# Patient Record
Sex: MALE | Race: BLACK OR AFRICAN AMERICAN | Employment: UNEMPLOYED | URBAN - METROPOLITAN AREA
[De-identification: names, ages, dates, MRNs, and addresses within clinical notes are randomized per-mention and may not be internally consistent; named-entity substitution may affect disease eponyms.]

---

## 2017-03-08 DIAGNOSIS — I10 ESSENTIAL HYPERTENSION: ICD-10-CM

## 2017-03-08 DIAGNOSIS — E66.9 OBESITY (BMI 30-39.9): ICD-10-CM

## 2017-03-08 RX ORDER — AMLODIPINE BESYLATE 10 MG/1
TABLET ORAL
Qty: 30 TABLET | Refills: 0 | Status: SHIPPED | OUTPATIENT
Start: 2017-03-08 | End: 2017-07-10 | Stop reason: SDUPTHER

## 2017-07-10 DIAGNOSIS — E66.9 OBESITY (BMI 30-39.9): ICD-10-CM

## 2017-07-10 DIAGNOSIS — I10 ESSENTIAL HYPERTENSION: ICD-10-CM

## 2017-07-10 RX ORDER — LOSARTAN POTASSIUM AND HYDROCHLOROTHIAZIDE 25; 100 MG/1; MG/1
TABLET ORAL
Qty: 30 TABLET | Refills: 0 | Status: SHIPPED | OUTPATIENT
Start: 2017-07-10 | End: 2017-08-02 | Stop reason: SDUPTHER

## 2017-07-10 RX ORDER — LOSARTAN POTASSIUM AND HYDROCHLOROTHIAZIDE 25; 100 MG/1; MG/1
TABLET ORAL
Qty: 30 TABLET | Refills: 3 | Status: CANCELLED | OUTPATIENT
Start: 2017-07-10

## 2017-07-10 RX ORDER — AMLODIPINE BESYLATE 10 MG/1
TABLET ORAL
Qty: 30 TABLET | Refills: 0 | Status: SHIPPED | OUTPATIENT
Start: 2017-07-10 | End: 2017-08-02 | Stop reason: SDUPTHER

## 2017-07-10 RX ORDER — AMLODIPINE BESYLATE 10 MG/1
TABLET ORAL
Qty: 30 TABLET | Refills: 3 | Status: CANCELLED | OUTPATIENT
Start: 2017-07-10

## 2017-08-02 ENCOUNTER — INITIAL CONSULT (OUTPATIENT)
Dept: PEDIATRIC CARDIOLOGY | Age: 22
End: 2017-08-02
Payer: COMMERCIAL

## 2017-08-02 VITALS
HEART RATE: 96 BPM | DIASTOLIC BLOOD PRESSURE: 88 MMHG | HEIGHT: 65 IN | WEIGHT: 299.4 LBS | OXYGEN SATURATION: 99 % | RESPIRATION RATE: 24 BRPM | BODY MASS INDEX: 49.88 KG/M2 | SYSTOLIC BLOOD PRESSURE: 150 MMHG

## 2017-08-02 DIAGNOSIS — E66.9 OBESITY (BMI 30-39.9): Primary | ICD-10-CM

## 2017-08-02 DIAGNOSIS — I10 ESSENTIAL HYPERTENSION: ICD-10-CM

## 2017-08-02 PROCEDURE — 99214 OFFICE O/P EST MOD 30 MIN: CPT | Performed by: PEDIATRICS

## 2017-08-02 RX ORDER — AMLODIPINE BESYLATE 10 MG/1
10 TABLET ORAL DAILY
Qty: 30 TABLET | Refills: 5 | Status: SHIPPED | OUTPATIENT
Start: 2017-08-02 | End: 2018-03-26 | Stop reason: SDUPTHER

## 2017-08-02 RX ORDER — LOSARTAN POTASSIUM AND HYDROCHLOROTHIAZIDE 25; 100 MG/1; MG/1
1 TABLET ORAL DAILY
Qty: 30 TABLET | Refills: 0 | Status: SHIPPED | OUTPATIENT
Start: 2017-08-02 | End: 2018-03-26 | Stop reason: SDUPTHER

## 2017-08-29 ENCOUNTER — HOSPITAL ENCOUNTER (EMERGENCY)
Age: 22
Discharge: HOME | End: 2017-08-29
Attending: EMERGENCY MEDICINE
Payer: COMMERCIAL

## 2017-08-29 VITALS
OXYGEN SATURATION: 97 % | TEMPERATURE: 98.6 F | SYSTOLIC BLOOD PRESSURE: 159 MMHG | HEART RATE: 101 BPM | DIASTOLIC BLOOD PRESSURE: 86 MMHG | RESPIRATION RATE: 18 BRPM

## 2017-08-29 DIAGNOSIS — F69 BEHAVIOR CONCERN IN ADULT: Primary | ICD-10-CM

## 2017-08-29 PROCEDURE — 99284 EMERGENCY DEPT VISIT MOD MDM: CPT

## 2017-08-29 ASSESSMENT — ENCOUNTER SYMPTOMS
ABDOMINAL PAIN: 0
SORE THROAT: 0
VOMITING: 0
CHEST TIGHTNESS: 0
SHORTNESS OF BREATH: 0
EYE PAIN: 0
NAUSEA: 0

## 2018-03-07 ENCOUNTER — HOSPITAL ENCOUNTER (INPATIENT)
Age: 23
LOS: 4 days | Discharge: HOME | DRG: 753 | End: 2018-03-11
Attending: PSYCHIATRY & NEUROLOGY | Admitting: PSYCHIATRY & NEUROLOGY
Payer: COMMERCIAL

## 2018-03-07 DIAGNOSIS — F31.9 BIPOLAR 1 DISORDER (HCC): Primary | ICD-10-CM

## 2018-03-07 DIAGNOSIS — E66.9 OBESITY (BMI 30-39.9): ICD-10-CM

## 2018-03-07 DIAGNOSIS — F79 MENTAL RETARDATION: ICD-10-CM

## 2018-03-07 LAB
ACETAMINOPHEN LEVEL: <15 UG/ML (ref 10–30)
ALBUMIN SERPL-MCNC: 5.2 G/DL (ref 3.9–4.9)
ALP BLD-CCNC: 81 U/L (ref 35–104)
ALT SERPL-CCNC: 54 U/L (ref 0–41)
AMPHETAMINE SCREEN, URINE: NORMAL
ANION GAP SERPL CALCULATED.3IONS-SCNC: 15 MEQ/L (ref 7–13)
AST SERPL-CCNC: 37 U/L (ref 0–40)
BACTERIA: NORMAL /HPF
BARBITURATE SCREEN URINE: NORMAL
BASOPHILS ABSOLUTE: 0 K/UL (ref 0–0.2)
BASOPHILS RELATIVE PERCENT: 0.6 %
BENZODIAZEPINE SCREEN, URINE: NORMAL
BILIRUB SERPL-MCNC: 0.8 MG/DL (ref 0–1.2)
BILIRUBIN URINE: NEGATIVE
BLOOD, URINE: NEGATIVE
BUN BLDV-MCNC: 10 MG/DL (ref 6–20)
CALCIUM SERPL-MCNC: 10 MG/DL (ref 8.6–10.2)
CANNABINOID SCREEN URINE: NORMAL
CHLORIDE BLD-SCNC: 95 MEQ/L (ref 98–107)
CK MB: 7.4 NG/ML (ref 0–6.7)
CLARITY: CLEAR
CO2: 28 MEQ/L (ref 22–29)
COCAINE METABOLITE SCREEN URINE: NORMAL
COLOR: YELLOW
CREAT SERPL-MCNC: 0.8 MG/DL (ref 0.7–1.2)
CREATINE KINASE-MB INDEX: 1.1 % (ref 0–3.5)
EKG ATRIAL RATE: 82 BPM
EKG P AXIS: 61 DEGREES
EKG P-R INTERVAL: 154 MS
EKG Q-T INTERVAL: 362 MS
EKG QRS DURATION: 86 MS
EKG QTC CALCULATION (BAZETT): 422 MS
EKG R AXIS: 77 DEGREES
EKG T AXIS: 30 DEGREES
EKG VENTRICULAR RATE: 82 BPM
EOSINOPHILS ABSOLUTE: 0.2 K/UL (ref 0–0.7)
EOSINOPHILS RELATIVE PERCENT: 2.9 %
EPITHELIAL CELLS, UA: NORMAL /HPF
ETHANOL PERCENT: NORMAL G/DL
ETHANOL: <10 MG/DL (ref 0–0.08)
GFR AFRICAN AMERICAN: >60
GFR NON-AFRICAN AMERICAN: >60
GLOBULIN: 3.1 G/DL (ref 2.3–3.5)
GLUCOSE BLD-MCNC: 96 MG/DL (ref 74–109)
GLUCOSE URINE: NEGATIVE MG/DL
HCT VFR BLD CALC: 50.4 % (ref 42–52)
HEMOGLOBIN: 16.8 G/DL (ref 14–18)
KETONES, URINE: NEGATIVE MG/DL
LEUKOCYTE ESTERASE, URINE: NEGATIVE
LYMPHOCYTES ABSOLUTE: 2.1 K/UL (ref 1–4.8)
LYMPHOCYTES RELATIVE PERCENT: 31.4 %
Lab: NORMAL
MCH RBC QN AUTO: 29 PG (ref 27–31.3)
MCHC RBC AUTO-ENTMCNC: 33.4 % (ref 33–37)
MCV RBC AUTO: 86.9 FL (ref 80–100)
MONOCYTES ABSOLUTE: 0.8 K/UL (ref 0.2–0.8)
MONOCYTES RELATIVE PERCENT: 11.7 %
NEUTROPHILS ABSOLUTE: 3.6 K/UL (ref 1.4–6.5)
NEUTROPHILS RELATIVE PERCENT: 53.4 %
NITRITE, URINE: NEGATIVE
OPIATE SCREEN URINE: NORMAL
PDW BLD-RTO: 13.5 % (ref 11.5–14.5)
PH UA: 6 (ref 5–9)
PHENCYCLIDINE SCREEN URINE: NORMAL
PLATELET # BLD: 290 K/UL (ref 130–400)
POTASSIUM REFLEX MAGNESIUM: 4.6 MEQ/L (ref 3.5–5.1)
PROTEIN UA: 100 MG/DL
RBC # BLD: 5.79 M/UL (ref 4.7–6.1)
RBC UA: NORMAL /HPF (ref 0–2)
SALICYLATE, SERUM: <0.3 MG/DL (ref 15–30)
SODIUM BLD-SCNC: 138 MEQ/L (ref 132–144)
SPECIFIC GRAVITY UA: 1.02 (ref 1–1.03)
TOTAL CK: 675 U/L (ref 0–190)
TOTAL PROTEIN: 8.3 G/DL (ref 6.4–8.1)
TSH SERPL DL<=0.05 MIU/L-ACNC: 1.96 UIU/ML (ref 0.27–4.2)
UROBILINOGEN, URINE: 0.2 E.U./DL
WBC # BLD: 6.8 K/UL (ref 4.8–10.8)
WBC UA: NORMAL /HPF (ref 0–5)

## 2018-03-07 PROCEDURE — 80053 COMPREHEN METABOLIC PANEL: CPT

## 2018-03-07 PROCEDURE — 82553 CREATINE MB FRACTION: CPT

## 2018-03-07 PROCEDURE — 81001 URINALYSIS AUTO W/SCOPE: CPT

## 2018-03-07 PROCEDURE — 84443 ASSAY THYROID STIM HORMONE: CPT

## 2018-03-07 PROCEDURE — 93005 ELECTROCARDIOGRAM TRACING: CPT

## 2018-03-07 PROCEDURE — 1240000000 HC EMOTIONAL WELLNESS R&B

## 2018-03-07 PROCEDURE — 80307 DRUG TEST PRSMV CHEM ANLYZR: CPT

## 2018-03-07 PROCEDURE — 6370000000 HC RX 637 (ALT 250 FOR IP): Performed by: NURSE PRACTITIONER

## 2018-03-07 PROCEDURE — 6370000000 HC RX 637 (ALT 250 FOR IP): Performed by: PSYCHIATRY & NEUROLOGY

## 2018-03-07 PROCEDURE — 2500000003 HC RX 250 WO HCPCS: Performed by: NURSE PRACTITIONER

## 2018-03-07 PROCEDURE — 36415 COLL VENOUS BLD VENIPUNCTURE: CPT

## 2018-03-07 PROCEDURE — 99285 EMERGENCY DEPT VISIT HI MDM: CPT

## 2018-03-07 PROCEDURE — G0480 DRUG TEST DEF 1-7 CLASSES: HCPCS

## 2018-03-07 PROCEDURE — 85025 COMPLETE CBC W/AUTO DIFF WBC: CPT

## 2018-03-07 PROCEDURE — 82550 ASSAY OF CK (CPK): CPT

## 2018-03-07 RX ORDER — AMLODIPINE BESYLATE 10 MG/1
10 TABLET ORAL DAILY
Status: DISCONTINUED | OUTPATIENT
Start: 2018-03-08 | End: 2018-03-11 | Stop reason: HOSPADM

## 2018-03-07 RX ORDER — ZONISAMIDE 50 MG/1
50 CAPSULE ORAL 2 TIMES DAILY
Status: DISCONTINUED | OUTPATIENT
Start: 2018-03-07 | End: 2018-03-08

## 2018-03-07 RX ORDER — PSEUDOEPHEDRINE HCL 60 MG/1
60 TABLET ORAL EVERY 4 HOURS PRN
Status: DISCONTINUED | OUTPATIENT
Start: 2018-03-07 | End: 2018-03-11 | Stop reason: HOSPADM

## 2018-03-07 RX ORDER — HALOPERIDOL 5 MG
5 TABLET ORAL EVERY 6 HOURS PRN
Status: DISCONTINUED | OUTPATIENT
Start: 2018-03-07 | End: 2018-03-11 | Stop reason: HOSPADM

## 2018-03-07 RX ORDER — TRAZODONE HYDROCHLORIDE 50 MG/1
50 TABLET ORAL NIGHTLY PRN
Status: DISCONTINUED | OUTPATIENT
Start: 2018-03-07 | End: 2018-03-11 | Stop reason: HOSPADM

## 2018-03-07 RX ORDER — HYDROXYZINE HYDROCHLORIDE 50 MG/ML
50 INJECTION, SOLUTION INTRAMUSCULAR EVERY 6 HOURS PRN
Status: DISCONTINUED | OUTPATIENT
Start: 2018-03-07 | End: 2018-03-11 | Stop reason: HOSPADM

## 2018-03-07 RX ORDER — LOSARTAN POTASSIUM AND HYDROCHLOROTHIAZIDE 12.5; 5 MG/1; MG/1
2 TABLET ORAL DAILY
Status: DISCONTINUED | OUTPATIENT
Start: 2018-03-08 | End: 2018-03-11 | Stop reason: HOSPADM

## 2018-03-07 RX ORDER — MAGNESIUM HYDROXIDE/ALUMINUM HYDROXICE/SIMETHICONE 120; 1200; 1200 MG/30ML; MG/30ML; MG/30ML
30 SUSPENSION ORAL PRN
Status: DISCONTINUED | OUTPATIENT
Start: 2018-03-07 | End: 2018-03-11 | Stop reason: HOSPADM

## 2018-03-07 RX ORDER — ACETAMINOPHEN 500 MG
1000 TABLET ORAL ONCE
Status: COMPLETED | OUTPATIENT
Start: 2018-03-07 | End: 2018-03-07

## 2018-03-07 RX ORDER — BENZTROPINE MESYLATE 1 MG/ML
2 INJECTION INTRAMUSCULAR; INTRAVENOUS 2 TIMES DAILY PRN
Status: DISCONTINUED | OUTPATIENT
Start: 2018-03-07 | End: 2018-03-11 | Stop reason: HOSPADM

## 2018-03-07 RX ORDER — HALOPERIDOL 5 MG/ML
5 INJECTION INTRAMUSCULAR EVERY 6 HOURS PRN
Status: DISCONTINUED | OUTPATIENT
Start: 2018-03-07 | End: 2018-03-11 | Stop reason: HOSPADM

## 2018-03-07 RX ORDER — HYDROXYZINE PAMOATE 50 MG/1
50 CAPSULE ORAL EVERY 6 HOURS PRN
Status: DISCONTINUED | OUTPATIENT
Start: 2018-03-07 | End: 2018-03-11 | Stop reason: HOSPADM

## 2018-03-07 RX ORDER — ACETAMINOPHEN 325 MG/1
650 TABLET ORAL EVERY 4 HOURS PRN
Status: DISCONTINUED | OUTPATIENT
Start: 2018-03-07 | End: 2018-03-11 | Stop reason: HOSPADM

## 2018-03-07 RX ADMIN — GUAIFENESIN 200 MG: 200 SOLUTION ORAL at 22:20

## 2018-03-07 RX ADMIN — PSEUDOEPHEDRINE HYDROCHLORIDE 60 MG: 60 TABLET, FILM COATED ORAL at 22:20

## 2018-03-07 RX ADMIN — ACETAMINOPHEN 1000 MG: 500 TABLET ORAL at 18:23

## 2018-03-07 RX ADMIN — HYDROXYZINE PAMOATE 50 MG: 50 CAPSULE ORAL at 20:57

## 2018-03-07 RX ADMIN — ZONISAMIDE 50 MG: 50 CAPSULE ORAL at 20:57

## 2018-03-07 RX ADMIN — TRAZODONE HYDROCHLORIDE 50 MG: 50 TABLET ORAL at 20:57

## 2018-03-07 ASSESSMENT — ENCOUNTER SYMPTOMS
COUGH: 0
SHORTNESS OF BREATH: 0
NAUSEA: 0
DIARRHEA: 0
ABDOMINAL PAIN: 0
VOMITING: 0

## 2018-03-07 ASSESSMENT — PAIN SCALES - GENERAL: PAINLEVEL_OUTOF10: 8

## 2018-03-07 ASSESSMENT — SLEEP AND FATIGUE QUESTIONNAIRES
DO YOU USE A SLEEP AID: YES
DIFFICULTY STAYING ASLEEP: YES
AVERAGE NUMBER OF SLEEP HOURS: 6
DO YOU HAVE DIFFICULTY SLEEPING: NO
DIFFICULTY ARISING: NO
AVERAGE NUMBER OF SLEEP HOURS: 5
DIFFICULTY FALLING ASLEEP: YES
RESTFUL SLEEP: YES

## 2018-03-07 NOTE — ED NOTES
Hayden Milian CNP at bedside to see pt     Brook Epps, RN  03/07/18 252 [facility] 601, RN  03/07/18 6032

## 2018-03-07 NOTE — ED PROVIDER NOTES
absence of a cardiologist.        RADIOLOGY:   Non-plain film images such as CT, Ultrasound and MRI are read by the radiologist. Plain radiographic images are visualized and preliminarily interpreted by the emergency physician with the below findings:        Interpretation per the Radiologist below, if available at the time of this note:    No orders to display         ED BEDSIDE ULTRASOUND:   Performed by ED Physician - none    LABS:  Labs Reviewed   URINALYSIS - Abnormal; Notable for the following:        Result Value    Protein,  (*)     All other components within normal limits   SALICYLATE LEVEL - Abnormal; Notable for the following:     Salicylate, Serum <7.9 (*)     All other components within normal limits   COMPREHENSIVE METABOLIC PANEL W/ REFLEX TO MG FOR LOW K - Abnormal; Notable for the following:     Chloride 95 (*)     Anion Gap 15 (*)     Total Protein 8.3 (*)     Alb 5.2 (*)     ALT 54 (*)     All other components within normal limits   CK - Abnormal; Notable for the following: Total  (*)     All other components within normal limits   CKMB & RELATIVE PERCENT - Abnormal; Notable for the following:     CK-MB 7.4 (*)     All other components within normal limits   CBC WITH AUTO DIFFERENTIAL   TSH WITHOUT REFLEX   ETHANOL   URINE DRUG SCREEN   ACETAMINOPHEN LEVEL   MICROSCOPIC URINALYSIS       All other labs were within normal range or not returned as of this dictation. EMERGENCY DEPARTMENT COURSE and DIFFERENTIAL DIAGNOSIS/MDM:   Vitals:    Vitals:    03/09/18 1015 03/09/18 1813 03/10/18 0936 03/10/18 0939   BP: (!) 144/75 116/67 (!) 140/81 (!) 179/92   Pulse: 120 100 104 100   Resp:  18 20    Temp:  98.6 °F (37 °C) 98 °F (36.7 °C)    TempSrc:  Oral     SpO2:   97%    Weight:       Height:             ED Course      MDM The patient is medically cleared for psychiatric evaluation.     CRITICAL CARE TIME       CONSULTS:  IP CONSULT TO HOSPITALIST  IP CONSULT TO SOCIAL

## 2018-03-07 NOTE — ED TRIAGE NOTES
Pt transported by [facility] and pink slipped by Sid.  Pt assessed at the [facility] for having homicidal thoughts toward mother and grandmother
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
## 2018-03-08 PROBLEM — F79 MENTAL RETARDATION: Status: ACTIVE | Noted: 2018-03-08

## 2018-03-08 PROBLEM — F31.4 BIPOLAR 1 DISORDER, DEPRESSED, SEVERE (HCC): Status: ACTIVE | Noted: 2018-03-07

## 2018-03-08 PROCEDURE — 6370000000 HC RX 637 (ALT 250 FOR IP): Performed by: PSYCHIATRY & NEUROLOGY

## 2018-03-08 PROCEDURE — 93010 ELECTROCARDIOGRAM REPORT: CPT | Performed by: INTERNAL MEDICINE

## 2018-03-08 PROCEDURE — 6370000000 HC RX 637 (ALT 250 FOR IP): Performed by: NURSE PRACTITIONER

## 2018-03-08 PROCEDURE — 99223 1ST HOSP IP/OBS HIGH 75: CPT | Performed by: PSYCHIATRY & NEUROLOGY

## 2018-03-08 PROCEDURE — 1240000000 HC EMOTIONAL WELLNESS R&B

## 2018-03-08 RX ORDER — ARIPIPRAZOLE 5 MG/1
5 TABLET ORAL DAILY
Status: DISCONTINUED | OUTPATIENT
Start: 2018-03-08 | End: 2018-03-11 | Stop reason: HOSPADM

## 2018-03-08 RX ORDER — DIVALPROEX SODIUM 500 MG/1
500 TABLET, EXTENDED RELEASE ORAL NIGHTLY
Status: DISCONTINUED | OUTPATIENT
Start: 2018-03-08 | End: 2018-03-11 | Stop reason: HOSPADM

## 2018-03-08 RX ADMIN — CARIPRAZINE 1.5 MG: 1.5 CAPSULE, GELATIN COATED ORAL at 09:07

## 2018-03-08 RX ADMIN — TRAZODONE HYDROCHLORIDE 50 MG: 50 TABLET ORAL at 21:04

## 2018-03-08 RX ADMIN — AMLODIPINE BESYLATE 10 MG: 10 TABLET ORAL at 09:07

## 2018-03-08 RX ADMIN — ZONISAMIDE 50 MG: 50 CAPSULE ORAL at 09:07

## 2018-03-08 RX ADMIN — ARIPIPRAZOLE 5 MG: 5 TABLET ORAL at 12:45

## 2018-03-08 RX ADMIN — PSEUDOEPHEDRINE HYDROCHLORIDE 60 MG: 60 TABLET, FILM COATED ORAL at 09:10

## 2018-03-08 RX ADMIN — LOSARTAN POTASSIUM AND HYDROCHLOROTHIAZIDE 2 TABLET: 12.5; 5 TABLET ORAL at 09:07

## 2018-03-08 RX ADMIN — DIVALPROEX SODIUM 500 MG: 500 TABLET, FILM COATED, EXTENDED RELEASE ORAL at 21:04

## 2018-03-08 NOTE — CONSULTS
[address] [facility] MEDICINE    HISTORY AND PHYSICAL EXAM    PATIENT NAME:  Meka Burgess    MRN:  [id number]  SERVICE DATE:  3/8/2018   SERVICE TIME:  2:01 PM    Primary Care Physician: Kuldeep Hussein MD         SUBJECTIVE  CHIEF COMPLAINT:  Medical clear for inpatient psychiatry admission. Consult for medical H/P encounter. HPI:  This is a 25 y.o. male who presents with psychiatric evaluation, HI. Denies cp, sob, fever or chills, N/V    PAST MEDICAL HISTORY:    Past Medical History:   Diagnosis Date    ADHD (attention deficit hyperactivity disorder) 4/4/2012    Allergic rhinitis 4/4/2012    Hypertension     Obesity (BMI 30-39.9) 4/4/2012     PAST SURGICAL HISTORY:    Past Surgical History:   Procedure Laterality Date    TONSILLECTOMY       FAMILY HISTORY:    Family History   Problem Relation Age of Onset    No Known Problems Mother     No Known Problems Father      SOCIAL HISTORY:    Social History     Social History    Marital status: Single     Spouse name: N/A    Number of children: N/A    Years of education: N/A     Occupational History    Not on file.      Social History Main Topics    Smoking status: Current Some Day Smoker     Packs/day: 0.50     Types: Cigarettes    Smokeless tobacco: Never Used    Alcohol use No    Drug use: No    Sexual activity: Not on file     Other Topics Concern    Not on file     Social History Narrative    No narrative on file     MEDICATIONS:    Current Facility-Administered Medications   Medication Dose Route Frequency Provider Last Rate Last Dose    ARIPiprazole (ABILIFY) tablet 5 mg  5 mg Oral Daily Farida Smith MD   5 mg at 03/08/18 1245    divalproex (DEPAKOTE ER) extended release tablet 500 mg  500 mg Oral Nightly Farida Smith MD        amLODIPine (NORVASC) tablet 10 mg  10 mg Oral Daily Marnette Osler, MD   10 mg at 03/08/18 0907    losartan-hydrochlorothiazide (HYZAAR) 50-12.5 MG per tablet 2 tablet  2 tablet Oral Daily Kaitlin Hicks [facility] MD   2 tablet at 03/08/18 1289    acetaminophen (TYLENOL) tablet 650 mg  650 mg Oral Q4H PRN Maria L Kohler MD        traZODone (DESYREL) tablet 50 mg  50 mg Oral Nightly PRN Maria L Kohler MD   50 mg at 03/07/18 2057    benztropine mesylate (COGENTIN) injection 2 mg  2 mg Intramuscular BID PRN Maria L Kohler MD        magnesium hydroxide (MILK OF MAGNESIA) 400 MG/5ML suspension 30 mL  30 mL Oral Daily PRN Maria L Kohler MD        aluminum & magnesium hydroxide-simethicone (MAALOX) 200-200-20 MG/5ML suspension 30 mL  30 mL Oral PRN Maria L Kohler MD        hydrOXYzine (VISTARIL) injection 50 mg  50 mg Intramuscular Q6H PRN Maria L Kohler MD        Or    hydrOXYzine (VISTARIL) capsule 50 mg  50 mg Oral Q6H PRN Maria L Kohler MD   50 mg at 03/07/18 2057    haloperidol (HALDOL) tablet 5 mg  5 mg Oral Q6H PRN Maria L Kohler MD        Or    haloperidol lactate (HALDOL) injection 5 mg  5 mg Intramuscular Q6H PRN Maria L Kohler MD        guaiFENesin (ROBITUSSIN) 100 MG/5ML liquid 200 mg  200 mg Oral Q4H PRN Gregory Para, CNP   200 mg at 03/07/18 2220    pseudoephedrine (SUDAFED) tablet 60 mg  60 mg Oral Q4H PRN Gregory Para, CNP   60 mg at 03/08/18 7143       ALLERGIES: Patient has no known allergies. REVIEW OF SYSTEM:   ROS as noted in HPI, 12 point ROS reviewed and otherwise negative. OBJECTIVE  PHYSICAL EXAM: BP (!) 142/63   Pulse 123 Comment: Jessica WATTS notified  Temp 98 °F (36.7 °C) (Oral)   Resp 20   Ht 5' 5\" (1.651 m)   Wt 280 lb (127 kg)   SpO2 99%   BMI 46.59 kg/m²       General appearance:  No apparent distress, appears stated age and cooperative. HEENT:  Normal cephalic, atraumatic without obvious deformity. Pupils equal, round, and reactive to light. Extra ocular muscles intact. Conjunctivae/corneas clear. Neck: Supple, with full range of motion. No jugular venous distention. Trachea midline. Respiratory:  Normal respiratory effort.  Clear to auscultation, bilaterally without Rales/Wheezes/Rhonchi. Cardiovascular:  Regular rate and rhythm with normal S1/S2 without murmurs, rubs or gallops. Abdomen: Soft, non-tender, non-distended with normal bowel sounds. Musculoskeletal:  No clubbing, cyanosis or edema bilaterally. Skin: Skin color, texture, turgor normal.  No rashes or lesions. Neurologic:  Neurovascularly intact without any focal sensory/motor deficits. Cranial nerves: II-XII intact, grossly non-focal.  Psychiatric: flat affect Alert and oriented, thought content appropriate, normal insight  Capillary Refill: Brisk,< 3 seconds   Peripheral Pulses: +2 palpable, equal bilaterally     DATA:     Diagnostic tests reviewed for today's visit:    Most recent labs and imaging results reviewed. ASSESSMENT AND PLAN  Patient Active Hospital Problem List:   Bipolar 1 disorder, depressed, severe ([facility] [address] (3/7/2018)    Assessment:     Plan:    Mental retardation (3/8/2018)    Assessment:     Plan:     HTN: restart cardiac medications        This is only a history and physical examination and not medical management. The patient is to contact and follow up with their primary care physician and go over any abnormal labs, imaging, findings, medical concerns, or conditions that we have and have not addressed during this encounter.     Plan of care discussed with: patient    SIGNATURE: Joey Burgess CNP  DATE: March 8, 2018  TIME: 2:01 PM

## 2018-03-08 NOTE — PROGRESS NOTES
Patient arrived to the unit via wheelchair accompanied by RN and security. Pt was asked to change into hospital pants and gown, skin and contraband check completed by this and ER RN, skin intact, no contraband found.  Pt oriented to unit surroundings, policies and services, shown to room and advised of the need to complete admission assessment and sign consents, denies any needs at this time.   Electronically signed by Drake Donnelly RN on 3/7/18 at 7:47 PM

## 2018-03-08 NOTE — PROGRESS NOTES
Pt. attended the 0900 community meeting.  Electronically signed by Cal Harrison on 3/8/2018 at 12:42 PM
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
## 2018-03-09 PROCEDURE — 1240000000 HC EMOTIONAL WELLNESS R&B

## 2018-03-09 PROCEDURE — 6370000000 HC RX 637 (ALT 250 FOR IP): Performed by: PSYCHIATRY & NEUROLOGY

## 2018-03-09 PROCEDURE — 99232 SBSQ HOSP IP/OBS MODERATE 35: CPT | Performed by: PSYCHIATRY & NEUROLOGY

## 2018-03-09 RX ADMIN — TRAZODONE HYDROCHLORIDE 50 MG: 50 TABLET ORAL at 20:29

## 2018-03-09 RX ADMIN — AMLODIPINE BESYLATE 10 MG: 10 TABLET ORAL at 08:36

## 2018-03-09 RX ADMIN — LOSARTAN POTASSIUM AND HYDROCHLOROTHIAZIDE 2 TABLET: 12.5; 5 TABLET ORAL at 08:36

## 2018-03-09 RX ADMIN — ARIPIPRAZOLE 5 MG: 5 TABLET ORAL at 08:36

## 2018-03-09 RX ADMIN — DIVALPROEX SODIUM 500 MG: 500 TABLET, FILM COATED, EXTENDED RELEASE ORAL at 20:29

## 2018-03-09 ASSESSMENT — LIFESTYLE VARIABLES: HISTORY_ALCOHOL_USE: YES

## 2018-03-09 NOTE — PROGRESS NOTES
no    Mental Status Examination:    Level of consciousness:  within normal limits   Appearance:  fair grooming and fair hygiene  Behavior/Motor:  no abnormalities noted  Attitude toward examiner:  cooperative  Speech:  slow   Mood: anxious  Affect:  mood congruent  Thought processes:  linear   Thought content:  Suicidal Ideation:  denies suicidal ideation  Delusions:  no evidence of delusions  Perceptual Disturbance:  denies any perceptual disturbance  Cognition:  oriented to person, place, and time   Concentration distractible  Insight fair   Judgement poor     ASSESSMENT:   Patient symptoms are:  [] Well controlled  [x] Improving  [] Worsening  [] No change      Diagnosis:   Active Problems:    Bipolar 1 disorder, depressed, severe ([facility] [address]    Mental retardation  Resolved Problems:    * No resolved hospital problems. *      LABS:    Recent Labs      03/07/18   1545   WBC  6.8   HGB  16.8   PLT  290     Recent Labs      03/07/18   1545   NA  138   K  4.6   CL  95*   CO2  28   BUN  10   CREATININE  0.80   GLUCOSE  96     Recent Labs      03/07/18   1545   BILITOT  0.8   ALKPHOS  81   AST  37   ALT  54*     Lab Results   Component Value Date    LABAMPH Neg 03/07/2018    BARBSCNU Neg 03/07/2018    LABBENZ Neg 03/07/2018    OPIATESCREENURINE Neg 03/07/2018    PHENCYCLIDINESCREENURINE Neg 03/07/2018    ETOH <10 03/07/2018     Lab Results   Component Value Date    TSH 1.960 03/07/2018     No results found for: LITHIUM  No results found for: VALPROATE, CBMZ      Treatment Plan:  Reviewed current Medications with the patient. Collateral from mom pending  Pt is asking for discharge and he is doing better  Recommend to continue to take his medication  Please consider discharge over the weeekend if stable  Risks, benefits, side effects, drug-to-drug interactions and alternatives to treatment were discussed. Encourage patient to attend group and other milieu activities.   Discharge planning discussed with the patient and

## 2018-03-09 NOTE — PROGRESS NOTES
Visible/ social with peers. Behaviors appropriate. Mood is cheerful/ bright affect. Laughing and joking.

## 2018-03-09 NOTE — PLAN OF CARE
Problem: Anger Management/Homicidal Ideation:  Intervention: Assess homicidal risk  Denies any wish to harm anyone  Intervention: Manage a safe environment  done  Intervention: Observe and document impulse control  Has not been impulsive and he feels the depakote has calmed him down   Intervention: Assess coping skills and behavior  ongoing    Goal: Ability to verbalize frustrations and anger appropriately will improve  Ability to verbalize frustrations and anger appropriately will improve   Outcome: Met This Shift    Goal: Absence of homicidal ideation  Absence of homicidal ideation   Outcome: Met This Shift

## 2018-03-09 NOTE — BH NOTE
Group Therapy Note    Date: 3/8/2018  Start Time: 1915  End Time:  2000  Number of Participants: 6    Type of Group: Recreational    Notes:  Pt was appropriate in group    Status After Intervention:  Improved    Participation Level: Interactive    Participation Quality: Appropriate      Speech:  normal      Thought Process/Content: Logical      Affective Functioning: Congruent      Mood: euthymic      Level of consciousness:  Alert      Response to Learning: Progressing to goal      Endings: None Reported    Modes of Intervention: Activity      Discipline Responsible: Livan Lennon    Electronically signed by Evelyn Polanco on 3/8/2018 at 11:01 PM

## 2018-03-09 NOTE — BH NOTE
Group Therapy Note    Date: 03/08/2018  Start Time: 1626  End Time:  2537  Number of Participants: 11    Type of Group: Cognitive Skills    Wellness Binder Information  Module Name:  Wellness toolbox  Session Number:  na    Patient's Goal:  Identify a tool for yourself    Notes:  Snored several times then would wake up, laugh and participate. Playful with peers.     Status After Intervention:  Unchanged    Participation Level: Minimal    Participation Quality: Lethargic      Speech:  normal      Thought Process/Content: Flight of ideas      Affective Functioning: Congruent      Mood: anxious      Level of consciousness:  Drowsy      Response to Learning: Able to verbalize current knowledge/experience and Able to verbalize/acknowledge new learning      Endings: None Reported    Modes of Intervention: Education, Support and Socialization      Discipline Responsible: Registered Nurse      Signature:  Mckenna Serna NP

## 2018-03-09 NOTE — CARE COORDINATION
Brief Intervention and Referral to Treatment Summary    Patient was provided PHQ-9, AUDIT and DAST Screening:      PHQ-9 Score:  0  AUDIT Score:  0  DAST Score:  5  Patients substance use is considered    Low Risk/Healthy  Moderate Risk x  Harmful  Dependent    Patients depression is considered:    Minimal x  Mild   Moderate  Moderately Severe  Severe    Brief Education Was Provided    Patient was receptive  Patient was not receptive x      Brief Intervention Is Provided (Only for AUDIT or DAST)    Patient reports readiness to decrease and/or stop use and a plan was discussed   Patient denies readiness to decrease and/or stop use and a plan was not discussed x      Recommendations/Referrals for Brief and/or Specialized Treatment Provided to Patient  Patient stated he just tried [facility] and alcohol for the first time. Patient said he has not used either in 2 months and does not need any tx for either.   Electronically signed by Sukumar Bolivar, [facility] on 3/9/2018 at 10:49 AM
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
## 2018-03-09 NOTE — PROGRESS NOTES
Group Therapy Note    Date: 3/9/2018  Start Time: 1100  End Time:  8215  Number of Participants: 3    Type of Group: Psychoeducation    Wellness Binder Information  Module Name:    Session Number:      Patient's Goal:  \"To feel better\"    Notes:  Patient was talkative but not loud and more in control. He work fairly and independently on his task in group. He felt much pride with his finish project. Status After Intervention:  Unchanged    Participation Level:  Active Listener    Participation Quality: Appropriate      Speech:  normal      Thought Process/Content: Linear      Affective Functioning: Flat      Mood: anxious      Level of consciousness:  Alert      Response to Learning: Progressing to goal      Endings: None Reported    Modes of Intervention: Education, Socialization and Activity      Discipline Responsible: Psychoeducational Specialist      Signature:  Ailyn Stout

## 2018-03-09 NOTE — CARE COORDINATION
Group Therapy Note    Date: 3/9/2018  Start Time: 220  End Time: 250  Number of Participants: 4    Type of Group: Psychotherapy    Wellness Binder Information  Module Name:  x  Session Number:  x    Patient's Goal:  To feel better    Notes:   Work on activity    Status After Intervention:  Improved    Participation Level: Interactive    Participation Quality: Appropriate      Speech:  normal      Thought Process/Content: Logical      Affective Functioning: Congruent      Mood: good      Level of consciousness:  Alert      Response to Learning: Able to verbalize current knowledge/experience      Endings: None Reported    Modes of Intervention: Support      Discipline Responsible: /Counselor   Electronically signed by KIRAN Hale on 3/9/2018 at 2:50 PM      Signature:  Roney Wallace, [facility]

## 2018-03-09 NOTE — CARE COORDINATION
FAMILY COLLATERAL NOTE    Family/Support Name: Lazarus Blumenthal  Contact #: [phone]  Relationship to Pt: mom      Placed call to above. Left message requesting return call for collateral. Had to go into past hx to gather # all current #'s provided were not correct.     Response:  LM  Electronically signed by Francisca Curiel on 3/9/2018 at 11:10 AM        Francisca Curiel

## 2018-03-09 NOTE — PROGRESS NOTES
Out on unit, social. Denies SI, HI, hallucinations or depression. Calm, cooperative, eating, sleeping and looking forward to MO.

## 2018-03-10 PROCEDURE — 6370000000 HC RX 637 (ALT 250 FOR IP): Performed by: NURSE PRACTITIONER

## 2018-03-10 PROCEDURE — 6370000000 HC RX 637 (ALT 250 FOR IP): Performed by: PSYCHIATRY & NEUROLOGY

## 2018-03-10 PROCEDURE — 1240000000 HC EMOTIONAL WELLNESS R&B

## 2018-03-10 RX ADMIN — PSEUDOEPHEDRINE HYDROCHLORIDE 60 MG: 60 TABLET, FILM COATED ORAL at 20:56

## 2018-03-10 RX ADMIN — PSEUDOEPHEDRINE HYDROCHLORIDE 60 MG: 60 TABLET, FILM COATED ORAL at 12:18

## 2018-03-10 RX ADMIN — AMLODIPINE BESYLATE 10 MG: 10 TABLET ORAL at 08:31

## 2018-03-10 RX ADMIN — ARIPIPRAZOLE 5 MG: 5 TABLET ORAL at 08:31

## 2018-03-10 RX ADMIN — LOSARTAN POTASSIUM AND HYDROCHLOROTHIAZIDE 2 TABLET: 12.5; 5 TABLET ORAL at 08:31

## 2018-03-10 RX ADMIN — DIVALPROEX SODIUM 500 MG: 500 TABLET, FILM COATED, EXTENDED RELEASE ORAL at 20:54

## 2018-03-10 ASSESSMENT — PAIN - FUNCTIONAL ASSESSMENT: PAIN_FUNCTIONAL_ASSESSMENT: 0-10

## 2018-03-10 NOTE — PROGRESS NOTES
Pt. attended the 0900 community meeting. Electronically signed by David Barahona [address] on 3/10/2018 at 9:56 AM

## 2018-03-10 NOTE — PROGRESS NOTES
Group Therapy Note    Date: 3/10/2018  Start Time: 1000  End Time:  1100  Number of Participants: 8    Type of Group: Psychoeducation    Wellness Binder Information  Module Name:    Session Number:      Patient's Goal:  \"To be happy and excited\"    Notes:  Patient was attentive, overall participation has improved and he was calmer in group. Status After Intervention:  Unchanged    Participation Level:  Active Listener    Participation Quality: Appropriate and Attentive      Speech:  normal      Thought Process/Content: Linear      Affective Functioning: Congruent      Mood: calmer      Level of consciousness:  Alert      Response to Learning: Progressing to goal      Endings: None Reported    Modes of Intervention: Education, Socialization and Activity      Discipline Responsible: Psychoeducational Specialist      Signature:  Thyra Spatz

## 2018-03-10 NOTE — PROGRESS NOTES
Visible/ social on unit with peers. Pleasant and polite, bright affect. Pt happy with medication regimen, looking forward to d/c.

## 2018-03-10 NOTE — PLAN OF CARE
Problem: Anger Management/Homicidal Ideation:  Goal: Ability to verbalize frustrations and anger appropriately will improve  Ability to verbalize frustrations and anger appropriately will improve   Outcome: Met This Shift    Goal: Absence of homicidal ideation  Absence of homicidal ideation   Outcome: Met This Shift

## 2018-03-11 VITALS
BODY MASS INDEX: 46.65 KG/M2 | RESPIRATION RATE: 22 BRPM | TEMPERATURE: 98 F | DIASTOLIC BLOOD PRESSURE: 86 MMHG | OXYGEN SATURATION: 98 % | WEIGHT: 280 LBS | SYSTOLIC BLOOD PRESSURE: 168 MMHG | HEART RATE: 106 BPM | HEIGHT: 65 IN

## 2018-03-11 PROCEDURE — 6370000000 HC RX 637 (ALT 250 FOR IP): Performed by: PSYCHIATRY & NEUROLOGY

## 2018-03-11 PROCEDURE — 6370000000 HC RX 637 (ALT 250 FOR IP): Performed by: NURSE PRACTITIONER

## 2018-03-11 RX ORDER — ARIPIPRAZOLE 5 MG/1
5 TABLET ORAL DAILY
Qty: 30 TABLET | Refills: 3 | Status: SHIPPED | OUTPATIENT
Start: 2018-03-12 | End: 2018-10-01 | Stop reason: ALTCHOICE

## 2018-03-11 RX ORDER — DIVALPROEX SODIUM 500 MG/1
500 TABLET, EXTENDED RELEASE ORAL NIGHTLY
Qty: 30 TABLET | Refills: 3 | Status: SHIPPED | OUTPATIENT
Start: 2018-03-11 | End: 2022-02-16 | Stop reason: ALTCHOICE

## 2018-03-11 RX ADMIN — ARIPIPRAZOLE 5 MG: 5 TABLET ORAL at 08:44

## 2018-03-11 RX ADMIN — PSEUDOEPHEDRINE HYDROCHLORIDE 60 MG: 60 TABLET, FILM COATED ORAL at 11:26

## 2018-03-11 RX ADMIN — AMLODIPINE BESYLATE 10 MG: 10 TABLET ORAL at 08:44

## 2018-03-11 RX ADMIN — LOSARTAN POTASSIUM AND HYDROCHLOROTHIAZIDE 2 TABLET: 12.5; 5 TABLET ORAL at 08:44

## 2018-03-11 NOTE — PROGRESS NOTES
Pt asks for cold medicine for cough and congestion. Patient given pseudoephedrine and robitussin.  Electronically signed by Addi Diggs LPN on 3/30/2019 at 49:19 AM

## 2018-03-11 NOTE — PROGRESS NOTES
Group Therapy Note    Date:3/11/18  Start Time:1000  End Time:  9953    Number of Participants: 13    Type of Group: Psychoeducation    Patient's Goal:  To participate in activities group. Notes: Patient declined to attend psychoeducation group at 1000 despite encouragement by staff.      Discipline Responsible: /Counselor    SYLVIA Cain

## 2018-03-11 NOTE — PROGRESS NOTES
Resp 22   Ht 5' 5\" (1.651 m)   Wt 280 lb (127 kg)   SpO2 98%   BMI 46.59 kg/m²   Gait - steady  Medication side effects(SE): no    Mental Status Examination:    Level of consciousness:  within normal limits   Appearance:  fair grooming and fair hygiene  Behavior/Motor:  no abnormalities noted  Attitude toward examiner:  cooperative  Speech:  slow   Mood: anxious  Affect:  mood congruent  Thought processes:  linear   Thought content:  Suicidal Ideation:  denies suicidal ideations; denies homicidal ideations now  Delusions:  no evidence of delusions  Perceptual Disturbance:  denies any perceptual disturbance  Cognition:  oriented to person, place, and time   Concentration distractible  Insight fair   Judgement poor     ASSESSMENT:   Patient symptoms are:  [] Well controlled  [x] Improving  [] Worsening  [] No change      Diagnosis:   Active Problems:    Bipolar 1 disorder, depressed, severe ([facility] [address]    Mental retardation  Resolved Problems:    * No resolved hospital problems. *      LABS:    No results for input(s): WBC, HGB, PLT in the last 72 hours. No results for input(s): NA, K, CL, CO2, BUN, CREATININE, GLUCOSE in the last 72 hours. No results for input(s): BILITOT, ALKPHOS, AST, ALT in the last 72 hours. Lab Results   Component Value Date    LABAMPH Neg 03/07/2018    BARBSCNU Neg 03/07/2018    LABBENZ Neg 03/07/2018    OPIATESCREENURINE Neg 03/07/2018    PHENCYCLIDINESCREENURINE Neg 03/07/2018    ETOH <10 03/07/2018     Lab Results   Component Value Date    TSH 1.960 03/07/2018     No results found for: LITHIUM  No results found for: VALPROATE, CBMZ      Treatment Plan:  Reviewed current Medications with the patient. VPA level will need to be drawn on 03/14/18 as an outpatient  Collateral from mom reviewed  Recommend to continue to take his medication, and keep appointments. Risks, benefits, side effects, drug-to-drug interactions and alternatives to treatment were discussed.   Encourage patient to attend group and other milieu activities. Discharge planning discussed with the patient and treatment team. Treatment team agreed that he has improved, and may be safely discharged back to the community, with follow up.      PSYCHOTHERAPY/COUNSELING:  [x] Therapeutic interview  [x] Supportive  [] CBT  [] Ongoing  [] Other    [] Patient continues to need, on a daily basis, active treatment furnished directly by or requiring the supervision of inpatient psychiatric personnel                Electronically signed by Matt Montoya MD on 3/11/2018 at 2:06 PM

## 2018-03-12 DIAGNOSIS — F79 MENTAL RETARDATION: ICD-10-CM

## 2018-03-12 DIAGNOSIS — F31.9 BIPOLAR 1 DISORDER (HCC): ICD-10-CM

## 2018-03-12 LAB — VALPROIC ACID LEVEL: 36 UG/ML (ref 50–100)

## 2018-03-15 NOTE — DISCHARGE SUMMARY
03/07/2018 Rare  /HPF Final    CK-MB 03/07/2018 7.4* 0.0 - 6.7 ng/mL Final    CK-MB Index 03/07/2018 1.1  0.0 - 3.5 % Final    Ventricular Rate 03/07/2018 82  BPM Final    Atrial Rate 03/07/2018 82  BPM Final    P-R Interval 03/07/2018 154  ms Final    QRS Duration 03/07/2018 86  ms Final    Q-T Interval 03/07/2018 362  ms Final    QTc Calculation (Bazett) 03/07/2018 422  ms Final    P Axis 03/07/2018 61  degrees Final    R Axis 03/07/2018 77  degrees Final    T Axis 03/07/2018 30  degrees Final           DISCHARGE DIAGNOSIS:     Schizoaffective disorder; Bipolar type   MR- mild    MEDICATIONS:   Discharge Medication List as of 3/11/2018  3:59 PM      START taking these medications    Details   divalproex (DEPAKOTE ER) 500 MG extended release tablet Take 1 tablet by mouth nightly, Disp-30 tablet, R-3Normal      ARIPiprazole (ABILIFY) 5 MG tablet Take 1 tablet by mouth daily, Disp-30 tablet, R-3Normal         CONTINUE these medications which have NOT CHANGED    Details   amLODIPine (NORVASC) 10 MG tablet Take 1 tablet by mouth daily Take 1 tablet by mouth daily, Disp-30 tablet, R-5, DAWPatient needs to make and keep appointment for further refillsPrint      losartan-hydrochlorothiazide (HYZAAR) 100-25 MG per tablet Take 1 tablet by mouth daily Take 1 tablet by mouth daily, Disp-30 tablet, R-0Pt must keep appt 8/2/2017 for further refills. Print      Blood Pressure Monitoring (ADULT BLOOD PRESSURE CUFF LG) KIT Disp-1 kit, R-0, PrintCheck blood pressure every other day         STOP taking these medications       montelukast (SINGULAIR) 10 MG tablet Comments:   Reason for Stopping:               DISPOSITION: Patient was discharged to home in stable condition. Patient has been [facility]. Patient was advised to call the outpatient provider, visit the nearest ED or call 911 if symptoms are not manageable.  Patient is not deemed to be an imminent risk of

## 2018-03-26 DIAGNOSIS — E66.9 OBESITY (BMI 30-39.9): ICD-10-CM

## 2018-03-26 DIAGNOSIS — I10 ESSENTIAL HYPERTENSION: ICD-10-CM

## 2018-03-26 RX ORDER — LOSARTAN POTASSIUM AND HYDROCHLOROTHIAZIDE 25; 100 MG/1; MG/1
1 TABLET ORAL DAILY
Qty: 30 TABLET | Refills: 2 | Status: SHIPPED | OUTPATIENT
Start: 2018-03-26 | End: 2018-05-02 | Stop reason: SDUPTHER

## 2018-03-26 RX ORDER — AMLODIPINE BESYLATE 10 MG/1
10 TABLET ORAL DAILY
Qty: 30 TABLET | Refills: 2 | Status: SHIPPED | OUTPATIENT
Start: 2018-03-26 | End: 2018-05-02 | Stop reason: ALTCHOICE

## 2018-04-13 ENCOUNTER — HOSPITAL ENCOUNTER (OUTPATIENT)
Dept: SLEEP CENTER | Age: 23
Discharge: HOME | End: 2018-04-15
Payer: MEDICARE

## 2018-04-13 PROCEDURE — 95810 POLYSOM 6/> YRS 4/> PARAM: CPT

## 2018-04-27 ENCOUNTER — HOSPITAL ENCOUNTER (OUTPATIENT)
Dept: SLEEP CENTER | Age: 23
Discharge: HOME | End: 2018-04-29
Payer: MEDICARE

## 2018-04-27 PROCEDURE — 95811 POLYSOM 6/>YRS CPAP 4/> PARM: CPT

## 2018-05-02 ENCOUNTER — OFFICE VISIT (OUTPATIENT)
Dept: PEDIATRIC CARDIOLOGY | Age: 23
End: 2018-05-02
Payer: MEDICARE

## 2018-05-02 VITALS
HEIGHT: 65 IN | BODY MASS INDEX: 52.48 KG/M2 | OXYGEN SATURATION: 99 % | RESPIRATION RATE: 22 BRPM | TEMPERATURE: 98.1 F | HEART RATE: 99 BPM | WEIGHT: 315 LBS | SYSTOLIC BLOOD PRESSURE: 144 MMHG | DIASTOLIC BLOOD PRESSURE: 91 MMHG

## 2018-05-02 DIAGNOSIS — E66.09 CLASS 1 OBESITY DUE TO EXCESS CALORIES WITH SERIOUS COMORBIDITY IN ADULT, UNSPECIFIED BMI: ICD-10-CM

## 2018-05-02 DIAGNOSIS — I10 ESSENTIAL HYPERTENSION: Primary | ICD-10-CM

## 2018-05-02 PROCEDURE — G8417 CALC BMI ABV UP PARAM F/U: HCPCS | Performed by: PEDIATRICS

## 2018-05-02 PROCEDURE — 99214 OFFICE O/P EST MOD 30 MIN: CPT | Performed by: PEDIATRICS

## 2018-05-02 PROCEDURE — G8427 DOCREV CUR MEDS BY ELIG CLIN: HCPCS | Performed by: PEDIATRICS

## 2018-05-02 PROCEDURE — 4004F PT TOBACCO SCREEN RCVD TLK: CPT | Performed by: PEDIATRICS

## 2018-05-02 RX ORDER — AMLODIPINE BESYLATE 10 MG/1
10 TABLET ORAL DAILY
COMMUNITY
End: 2018-05-02 | Stop reason: SDUPTHER

## 2018-05-02 RX ORDER — BENZTROPINE MESYLATE 1 MG/1
0.5 TABLET ORAL 2 TIMES DAILY
Refills: 0 | Status: ON HOLD | COMMUNITY
Start: 2018-04-27 | End: 2022-02-21 | Stop reason: HOSPADM

## 2018-05-02 RX ORDER — LOSARTAN POTASSIUM AND HYDROCHLOROTHIAZIDE 25; 100 MG/1; MG/1
1 TABLET ORAL DAILY
Qty: 30 TABLET | Refills: 5 | Status: SHIPPED | OUTPATIENT
Start: 2018-05-02 | End: 2018-09-05

## 2018-05-02 RX ORDER — AMLODIPINE BESYLATE 10 MG/1
10 TABLET ORAL DAILY
Qty: 30 TABLET | Refills: 5 | Status: SHIPPED | OUTPATIENT
Start: 2018-05-02 | End: 2018-09-05

## 2018-05-03 PROBLEM — E66.811 CLASS 1 OBESITY DUE TO EXCESS CALORIES WITH SERIOUS COMORBIDITY IN ADULT: Status: ACTIVE | Noted: 2018-05-03

## 2018-05-03 PROBLEM — E66.09 CLASS 1 OBESITY DUE TO EXCESS CALORIES WITH SERIOUS COMORBIDITY IN ADULT: Status: ACTIVE | Noted: 2018-05-03

## 2018-05-05 DIAGNOSIS — I10 ESSENTIAL HYPERTENSION: ICD-10-CM

## 2018-05-05 LAB
CHOLESTEROL, TOTAL: 151 MG/DL (ref 0–199)
HDLC SERPL-MCNC: 39 MG/DL (ref 40–59)
LDL CHOLESTEROL CALCULATED: 94 MG/DL (ref 0–129)
TRIGL SERPL-MCNC: 90 MG/DL (ref 0–200)

## 2018-05-17 ENCOUNTER — HOSPITAL ENCOUNTER (OUTPATIENT)
Dept: NON INVASIVE DIAGNOSTICS | Age: 23
Discharge: HOME | End: 2018-05-17
Payer: MEDICARE

## 2018-05-17 DIAGNOSIS — I10 ESSENTIAL HYPERTENSION: ICD-10-CM

## 2018-05-17 LAB
LV EF: 53 %
LVEF MODALITY: NORMAL

## 2018-05-17 PROCEDURE — 93306 TTE W/DOPPLER COMPLETE: CPT

## 2018-06-22 DIAGNOSIS — E66.9 OBESITY (BMI 30-39.9): ICD-10-CM

## 2018-06-22 DIAGNOSIS — I10 ESSENTIAL HYPERTENSION: ICD-10-CM

## 2018-06-22 RX ORDER — AMLODIPINE BESYLATE 10 MG/1
TABLET ORAL
Qty: 30 TABLET | Refills: 2 | Status: SHIPPED | OUTPATIENT
Start: 2018-06-22 | End: 2018-09-29 | Stop reason: SDUPTHER

## 2018-06-22 RX ORDER — LOSARTAN POTASSIUM AND HYDROCHLOROTHIAZIDE 25; 100 MG/1; MG/1
TABLET ORAL
Qty: 30 TABLET | Refills: 2 | Status: SHIPPED | OUTPATIENT
Start: 2018-06-22 | End: 2018-09-22 | Stop reason: SDUPTHER

## 2018-08-27 LAB — VALPROIC ACID LEVEL: 26.4 UG/ML (ref 50–100)

## 2018-09-05 ENCOUNTER — OFFICE VISIT (OUTPATIENT)
Dept: PEDIATRIC CARDIOLOGY | Age: 23
End: 2018-09-05
Payer: MEDICARE

## 2018-09-05 VITALS
OXYGEN SATURATION: 97 % | BODY MASS INDEX: 52.48 KG/M2 | SYSTOLIC BLOOD PRESSURE: 133 MMHG | DIASTOLIC BLOOD PRESSURE: 84 MMHG | HEIGHT: 65 IN | WEIGHT: 315 LBS | RESPIRATION RATE: 22 BRPM | HEART RATE: 94 BPM

## 2018-09-05 DIAGNOSIS — E66.9 OBESITY (BMI 30-39.9): ICD-10-CM

## 2018-09-05 DIAGNOSIS — I10 ESSENTIAL HYPERTENSION: Primary | ICD-10-CM

## 2018-09-05 PROCEDURE — G8417 CALC BMI ABV UP PARAM F/U: HCPCS | Performed by: PEDIATRICS

## 2018-09-05 PROCEDURE — 99214 OFFICE O/P EST MOD 30 MIN: CPT | Performed by: PEDIATRICS

## 2018-09-05 PROCEDURE — G8427 DOCREV CUR MEDS BY ELIG CLIN: HCPCS | Performed by: PEDIATRICS

## 2018-09-05 PROCEDURE — 1036F TOBACCO NON-USER: CPT | Performed by: PEDIATRICS

## 2018-09-05 NOTE — PROGRESS NOTES
medication and low fat and low energy diet. I asked him to decrease caloric intake, stop eating junk food and increase exercise. My recommendations are listed above. Thank you for allowing me to participate in the patient's care. Please do not hesitate to contact me with additional questions or concerns in the future.        Sincerely,      Seng Beverly MD & PhD    Pediatric Cardiologist  Jaylon Johnson Professor of Pediatrics  Division of Pediatric Cardiology  [facility]

## 2018-09-05 NOTE — LETTER
[facility] Pediatric Cardiology  [address] [place] [address] [address] [phone]    Roni Ellsworth MD    September 5, 2018     Eugenia Mauricio MD  [facility] [place][note 2] [id number]    Patient: Kristyn Snowden  MR Number: [id number]  YOB: 1995  Date of Visit: 9/5/2018    Dear Dr. Eugenia Mauricio: Thank you for referring Facundo Cooper to me for the evaluation of hypertension. Below are the relevant portions of my assessment and plan of care. CHIEF COMPLAINT: Kristyn Snowden is a 21 y.o. male who is referred by Eugenia Mauricio MD for evaluation of hypertension and obesity on 9/5/2018. HISTORY OF PRESENT ILLNESS: I had the opportunity to evaluate Kristyn Snowden for a follow up visit per your request in the pediatric cardiology clinic on 9/5/2018. As you know, Karon Favre is a 21 y.o. young male who was accompanied by his mother re-evaluation of hypertension and obesity. His last visit with me was in May. According to the patient, since last appointment, he has maintained compliance with the anti-hypertensive medications including Hyzaar (100-25mg) daily and amlodipine (Norvasc) 10 mg daily. He had blood pressure checks by the nurse at his [facility] once every week. He told me that his blood pressure was around 130/80 mmHg. He attempted to improve his diet style and exercised more. Since last appointment, he had lost 14 pounds. Otherwise, he has been doing well and without symptoms referable to cardiovascular systems, denies difficulty breathing, chest pain, intolerance to exercise, palpitation, cyanosis and syncope, etc. His CMP and EKG were completed, all were WNL. He was admitted in June of management of mental issue. ECHO was done in May that showed normal cardiac structure and function     PAST MEDICAL HISTORY:   Past medical history remains unchanged. He has allergic rhinitis and ADHD. He has no known drug allergies.     Past Medical History:   Diagnosis Date to be in pain and in no respiratory or other apparent distress. HEENT: Head was atraumatic and normocephalic. Eyes demonstrated extraocular muscles appeared intact without scleral icterus or nystagmus. ENT demonstrated no rhinorrhea and moist mucosal membranes of the oropharynx with no redness or lesions. The neck did not demonstrate JVD. The thyroid was nonpalpable. CHEST: Chest is symmetric and nontender to palpation. LUNGS: The lungs were clear to auscultation bilaterally with no wheezes, crackles or rhonchi. HEART:  The precordial activity appeared normal.  No thrills or heaves were noted. On auscultation, the patient had normal S1 and S2 with regular rate and rhythm. The second heart sound did split with inspiration. No murmur noted. No gallops, clicks or rubs were heard. Pulses were equal and symmetrical without pulse delay on all extremities. ABDOMEN: The abdomen was soft, nontender, nondistended, with no hepatosplenomegaly. EXTREMITIES: Warm and well-perfused, no clubbing, cyanosis or edema was seen. SKIN: The skin was intact and dry with no rashes or lesions. NEUROLOGY: Neurologic exam is grossly intact. STUDIES:    1. ECHO 5/17/18)  Normal cardiac structure, normal biventricular dimension and systolic function, no evidence of congenital heart disease     DIAGNOSES:  1. Stage II essential Hypertension  2. Obesity  3. ADHD and behavior disorder  4. Shortness of breath     RECOMMENDATIONS:   1. Continue Hyzaar (100/25mg) 1 tablet daily and Amlodipine 10mg daily   2. Blood pressure check two times per week and make blood pressure diary  3. If blood pressure is > 160/90mmHg, he should contact cardiologist or PCP  4. Follow the guideline of DASH diet (Low fat/low energy diet)  to improve diet style  5. Exercise at least 5 days per week, 30~45mins per day with pulse or heart rate 120~140 bpm  6.  Pediatric Cardiology follow up in 6 months with clinical evaluation

## 2018-09-05 NOTE — COMMUNICATION BODY
CHIEF COMPLAINT: Silvino Douglass is a 21 y.o. male who is referred by Jaime Huggins MD for evaluation of hypertension and obesity on 9/5/2018. HISTORY OF PRESENT ILLNESS: I had the opportunity to evaluate Silvino Douglass for a follow up visit per your request in the pediatric cardiology clinic on 9/5/2018. As you know, Jose Cerna is a 21 y.o. young male who was accompanied by his mother re-evaluation of hypertension and obesity. His last visit with me was in May. According to the patient, since last appointment, he has maintained compliance with the anti-hypertensive medications including Hyzaar (100-25mg) daily and amlodipine (Norvasc) 10 mg daily. He had blood pressure check by the nurse at his [facility] once every week. He told me that his blood pressure was around 130/80 mmHg. He attempt improve his diet style and exercised more. Since last appointment, he had lost 14 pounds. Otherwise, he has been doing well and without symptoms referable to cardiovascular systems, denies difficulty breathing, chest pain, intolerance to exercise, palpitation, cyanosis and syncope, etc. His CMP and EKG were completed, all were WNL. He was admitted in June of management of mental issue. ECHO was done in May that showed normal cardiac structure and function     PAST MEDICAL HISTORY:   Past medical history remains unchanged. He has allergic rhinitis and ADHD. He has no known drug allergies.     Past Medical History:   Diagnosis Date    ADHD (attention deficit hyperactivity disorder) 4/4/2012    Allergic rhinitis 4/4/2012    Hypertension     Obesity (BMI 30-39.9) 4/4/2012     Current Outpatient Prescriptions   Medication Sig Dispense Refill    losartan-hydrochlorothiazide (HYZAAR) 100-25 MG per tablet TAKE 1 TABLET BY MOUTH EVERY DAY 30 tablet 2    amLODIPine (NORVASC) 10 MG tablet TAKE 1 TABLET BY MOUTH EVERY DAY 30 tablet 2    benztropine (COGENTIN) 1 MG tablet Take 1 mg by mouth 2 times daily HEART:  The precordial activity appeared normal.  No thrills or heaves were noted. On auscultation, the patient had normal S1 and S2 with regular rate and rhythm. The second heart sound did split with inspiration. No murmur noted. No gallops, clicks or rubs were heard. Pulses were equal and symmetrical without pulse delay on all extremities. ABDOMEN: The abdomen was soft, nontender, nondistended, with no hepatosplenomegaly. EXTREMITIES: Warm and well-perfused, no clubbing, cyanosis or edema was seen. SKIN: The skin was intact and dry with no rashes or lesions. NEUROLOGY: Neurologic exam is grossly intact. STUDIES:    1. ECHO 5/17/18)  Normal cardiac structure, normal biventricular dimension and systolic function, no evidence of congenital heart disease     DIAGNOSES:  1. Stage II essential Hypertension  2. Obesity  3. ADHD and behavior disorder  4. Shortness of breath     RECOMMENDATIONS:   1. Continue Hyzaar (100/25mg) 1 tablet po q day and Amlodipine 10mg daily   2. Blood pressure check two times per week and make blood pressure diary  3. If blood pressure is > 160/90mmHg, he should contact cardiologist or PCP  4. Follow the guideline of DASH diet (Low fat/low energy diet)  to improve diet style  5. Exercise at least 5 days per week, 30~45mins per day with pulse or heart rate 120~140 bpm  6. Pediatric Cardiology follow up in 6 months with clinical evaluation    IMPRESSIONS AND DISCUSSIONS:   Kristyn Snowden is a 21years old male who presents for evaluation of obesity and hypertension. It is my impression that based on his blood pressure at home and clinic, his hypertension has been controlled well. I would like to keep him on  Amlodipine 10 mg daily and hyzaar at100/25mg daily at this time. He attempted to modify life style, at an result, he lost weight. I told Karon Favre again that his hypertension is associated with his obesity. I reiterated the importance of compliance with antihypertensive

## 2018-09-22 DIAGNOSIS — E66.9 OBESITY (BMI 30-39.9): ICD-10-CM

## 2018-09-22 DIAGNOSIS — I10 ESSENTIAL HYPERTENSION: ICD-10-CM

## 2018-09-24 RX ORDER — LOSARTAN POTASSIUM AND HYDROCHLOROTHIAZIDE 25; 100 MG/1; MG/1
TABLET ORAL
Qty: 30 TABLET | Refills: 5 | Status: SHIPPED | OUTPATIENT
Start: 2018-09-24 | End: 2019-04-12 | Stop reason: SDUPTHER

## 2018-09-29 DIAGNOSIS — I10 ESSENTIAL HYPERTENSION: ICD-10-CM

## 2018-09-29 DIAGNOSIS — E66.9 OBESITY (BMI 30-39.9): ICD-10-CM

## 2018-10-01 RX ORDER — AMLODIPINE BESYLATE 10 MG/1
TABLET ORAL
Qty: 30 TABLET | Refills: 5 | Status: SHIPPED | OUTPATIENT
Start: 2018-10-01 | End: 2019-04-12 | Stop reason: SDUPTHER

## 2018-10-01 RX ORDER — ARIPIPRAZOLE 10 MG/1
TABLET ORAL
Refills: 2 | COMMUNITY
Start: 2018-09-05 | End: 2022-02-16 | Stop reason: ALTCHOICE

## 2019-04-09 DIAGNOSIS — I10 ESSENTIAL HYPERTENSION: ICD-10-CM

## 2019-04-09 DIAGNOSIS — E66.9 OBESITY (BMI 30-39.9): ICD-10-CM

## 2019-04-12 DIAGNOSIS — E66.9 OBESITY (BMI 30-39.9): ICD-10-CM

## 2019-04-12 DIAGNOSIS — I10 ESSENTIAL HYPERTENSION: ICD-10-CM

## 2019-04-12 RX ORDER — LOSARTAN POTASSIUM AND HYDROCHLOROTHIAZIDE 25; 100 MG/1; MG/1
1 TABLET ORAL DAILY
Qty: 30 TABLET | Refills: 0 | Status: SHIPPED | OUTPATIENT
Start: 2019-04-12 | End: 2019-04-23

## 2019-04-12 RX ORDER — AMLODIPINE BESYLATE 10 MG/1
10 TABLET ORAL DAILY
Qty: 30 TABLET | Refills: 0 | Status: SHIPPED | OUTPATIENT
Start: 2019-04-12 | End: 2019-04-23

## 2019-04-23 RX ORDER — LOSARTAN POTASSIUM AND HYDROCHLOROTHIAZIDE 25; 100 MG/1; MG/1
TABLET ORAL
Qty: 30 TABLET | Refills: 0 | Status: SHIPPED | OUTPATIENT
Start: 2019-04-23 | End: 2019-04-24 | Stop reason: SDUPTHER

## 2019-04-23 RX ORDER — AMLODIPINE BESYLATE 10 MG/1
TABLET ORAL
Qty: 30 TABLET | Refills: 0 | Status: SHIPPED | OUTPATIENT
Start: 2019-04-23 | End: 2019-04-24 | Stop reason: SDUPTHER

## 2019-04-24 DIAGNOSIS — I10 ESSENTIAL HYPERTENSION: ICD-10-CM

## 2019-04-24 DIAGNOSIS — E66.9 OBESITY (BMI 30-39.9): ICD-10-CM

## 2019-04-24 RX ORDER — AMLODIPINE BESYLATE 10 MG/1
TABLET ORAL
Qty: 30 TABLET | Refills: 0 | Status: SHIPPED | OUTPATIENT
Start: 2019-04-24 | End: 2019-06-03 | Stop reason: SDUPTHER

## 2019-04-24 RX ORDER — LOSARTAN POTASSIUM AND HYDROCHLOROTHIAZIDE 25; 100 MG/1; MG/1
TABLET ORAL
Qty: 30 TABLET | Refills: 3 | Status: SHIPPED | OUTPATIENT
Start: 2019-04-24 | End: 2019-08-07 | Stop reason: SDUPTHER

## 2019-05-01 ENCOUNTER — OFFICE VISIT (OUTPATIENT)
Dept: PEDIATRIC CARDIOLOGY | Age: 24
End: 2019-05-01
Payer: MEDICARE

## 2019-05-01 VITALS
WEIGHT: 314.8 LBS | BODY MASS INDEX: 52.45 KG/M2 | RESPIRATION RATE: 24 BRPM | OXYGEN SATURATION: 100 % | DIASTOLIC BLOOD PRESSURE: 79 MMHG | HEIGHT: 65 IN | SYSTOLIC BLOOD PRESSURE: 139 MMHG | HEART RATE: 102 BPM

## 2019-05-01 DIAGNOSIS — I10 ESSENTIAL HYPERTENSION: Primary | ICD-10-CM

## 2019-05-01 PROCEDURE — G8427 DOCREV CUR MEDS BY ELIG CLIN: HCPCS | Performed by: PEDIATRICS

## 2019-05-01 PROCEDURE — 1036F TOBACCO NON-USER: CPT | Performed by: PEDIATRICS

## 2019-05-01 PROCEDURE — G8417 CALC BMI ABV UP PARAM F/U: HCPCS | Performed by: PEDIATRICS

## 2019-05-01 PROCEDURE — 99214 OFFICE O/P EST MOD 30 MIN: CPT | Performed by: PEDIATRICS

## 2019-05-01 NOTE — LETTER
[facility] Pediatric Cardiology  [address] [place] [address] [address] [phone]    Kelly Quezada MD    May 1, 2019     Khanh Wren MD  [facility] [id number]    Patient: Katlyn Hatfield  MR Number: [id number]  YOB: 1995  Date of Visit: 5/1/2019    Dear Dr. Khanh Wren: Thank you for referring Orpha Lob to me for evaluation. Below are the relevant portions of my assessment and plan of care. CHIEF COMPLAINT: Katlyn Hatfield is a 21 y.o. male who is referred by Khanh Wren MD for evaluation of hypertension and obesity on 5/1/2019. HISTORY OF PRESENT ILLNESS: I had the opportunity to evaluate Katlyn Hatfield for a follow up visit per your request in the pediatric cardiology clinic on 5/1/2019. As you know, Jigar Alvares is a 21 y.o. male who was accompanied by his mother for re-evaluation of hypertension and obesity. His last visit with me was 6 months ago. According to the patient, since his last appointment, he has maintained compliance with the anti-hypertensive medications including Hyzaar (100-25mg) daily and amlodipine (Norvasc) 10 mg daily. He had blood pressure check by the nurse at his [facility] once every week. He told me that his blood pressure was around 130/80 mmHg. He attempt improve his diet style and exercised more. Since last appointment, he had lost 7 pounds. Otherwise, he has been doing well and without symptoms referable to cardiovascular systems, denies difficulty breathing, chest pain, intolerance to exercise, palpitation, cyanosis and syncope, etc.     PAST MEDICAL HISTORY:   Past medical history remains unchanged. He has allergic rhinitis and ADHD. He has no known drug allergies.     Past Medical History:   Diagnosis Date    ADHD (attention deficit hyperactivity disorder) 4/4/2012    Allergic rhinitis 4/4/2012    Hypertension     Obesity (BMI 30-39.9) 4/4/2012     Current Outpatient Medications Medication Sig Dispense Refill    losartan-hydrochlorothiazide (HYZAAR) 100-25 MG per tablet TAKE 1 TABLET BY MOUTH ONE TIME DAILY 30 tablet 3    amLODIPine (NORVASC) 10 MG tablet TAKE 1 TABLET BY MOUTH ONE TIME DAILY 30 tablet 0    ARIPiprazole (ABILIFY) 10 MG tablet TAKE 1 TABLET BY MOUTH EVERY DAY  2    benztropine (COGENTIN) 1 MG tablet Take 1 mg by mouth 2 times daily  0    divalproex (DEPAKOTE ER) 500 MG extended release tablet Take 1 tablet by mouth nightly 30 tablet 3    Blood Pressure Monitoring (ADULT BLOOD PRESSURE CUFF LG) KIT Check blood pressure every other day 1 kit 0     No current facility-administered medications for this visit. FAMILY/SOCIAL HISTORY:    Family history is unchanged from his previous visit. His mother has hypertension. He lives with his mother and sibling and is active in organized sports without symptoms. He denies alcohol, tobacco, illicit or illegal drug use. REVIEW OF SYSTEMS:    Constitutional: negative  HEENT: negative  Respiratory: Negative. Cardiovascular: As described in HPI   Gastrointestinal: negative  Genitourinary: Negative. Musculoskeletal: negative  Skin: negative  Neurological: Negative. Hematological: Negative. Psychiatric/Behavioral: Negative. All other systems reviewed and are negative. PHYSICAL EXAMINATION:   His blood pressures were checked at the end of this visit: 170/95mmHg. Vitals:    05/01/19 1419   BP: 139/79   Site: Right Upper Arm   Position: Sitting   Cuff Size: Large Adult   Pulse: 102   Resp: 24   SpO2: 100%   Weight: (!) 314 lb 12.8 oz (142.8 kg)   Height: 5' 5\" (1.651 m)     GENERAL: He appeared well-nourished and well-developed and did not appear to be in pain and in no respiratory or other apparent distress. HEENT: Head was atraumatic and normocephalic. Eyes demonstrated extraocular muscles appeared intact without scleral icterus or nystagmus.   ENT demonstrated no rhinorrhea and moist mucosal membranes of the oropharynx with no redness or lesions. The neck did not demonstrate JVD. The thyroid was nonpalpable. CHEST: Chest is symmetric and nontender to palpation. LUNGS: The lungs were clear to auscultation bilaterally with no wheezes, crackles or rhonchi. HEART:  The precordial activity appeared normal.  No thrills or heaves were noted. On auscultation, the patient had normal S1 and S2 with regular rate and rhythm. The second heart sound did split with inspiration. No murmur noted. No gallops, clicks or rubs were heard. Pulses were equal and symmetrical without pulse delay on all extremities. ABDOMEN: The abdomen was soft, nontender, nondistended, with no hepatosplenomegaly. EXTREMITIES: Warm and well-perfused, no clubbing, cyanosis or edema was seen. SKIN: The skin was intact and dry with no rashes or lesions. NEUROLOGY: Neurologic exam is grossly intact. STUDIES:    1. ECHO 5/17/18)  Normal cardiac structure, normal biventricular dimension and systolic function, no evidence of congenital heart disease     DIAGNOSES:  1. Stage II essential Hypertension  2. Obesity  3. ADHD and behavior disorder  4. Shortness of breath     RECOMMENDATIONS:   1. Continue Hyzaar (100/25mg) 1 tablet po q day and Amlodipine 10mg daily   2. Blood pressure check two times per week and make blood pressure diary  3. If blood pressure is > 160/90mmHg, he should contact cardiologist or PCP  4. Follow the guideline of DASH diet (Low fat/low energy diet)  to improve diet style  5. Exercise at least 5 days per week, 30~45mins per day with pulse or heart rate 120~140 bpm  6. Pediatric Cardiology follow up in 6 months with clinical evaluation    IMPRESSIONS AND DISCUSSIONS:   Shannon Espinosa is a 21year old male who presents for reevaluation of obesity and hypertension. It is my impression that based on his blood pressure at home and clinic, his hypertension has been controlled well.  I would like to keep him on  Amlodipine 10 mg daily and hyzaar at100/25mg daily at this time. He attempted to modify life style, at an result, he lost weight. I told Miguel Melgoza again that his hypertension is associated with his obesity. I stressed the importance of compliance with antihypertensive medication and low fat and low energy diet. I asked him to decrease caloric intake, stop eating junk food and increase exercise. My recommendations are listed above. Thank you for allowing me to participate in the patient's care. Please do not hesitate to contact me with additional questions or concerns in the future.        Sincerely,    Lars Mcneal MD & PhD    Pediatric Cardiologist  Aidan Mejias Professor of Pediatrics  Division of Pediatric Cardiology  [facility] [place]

## 2019-05-01 NOTE — PROGRESS NOTES
mouth nightly 30 tablet 3    Blood Pressure Monitoring (ADULT BLOOD PRESSURE CUFF LG) KIT Check blood pressure every other day 1 kit 0     No current facility-administered medications for this visit. FAMILY/SOCIAL HISTORY:    Family history is unchanged from his previous visit. His mother has hypertension. He lives with his mother and sibling and is active in organized sports without symptoms. He denies alcohol, tobacco, illicit or illegal drug use. REVIEW OF SYSTEMS:    Constitutional: negative  HEENT: negative  Respiratory: Negative. Cardiovascular: As described in HPI   Gastrointestinal: negative  Genitourinary: Negative. Musculoskeletal: negative  Skin: negative  Neurological: Negative. Hematological: Negative. Psychiatric/Behavioral: Negative. All other systems reviewed and are negative. PHYSICAL EXAMINATION:   His blood pressures were checked at the end of this visit: 170/95mmHg. Vitals:    05/01/19 1419   BP: 139/79   Site: Right Upper Arm   Position: Sitting   Cuff Size: Large Adult   Pulse: 102   Resp: 24   SpO2: 100%   Weight: (!) 314 lb 12.8 oz (142.8 kg)   Height: 5' 5\" (1.651 m)     GENERAL: He appeared well-nourished and well-developed and did not appear to be in pain and in no respiratory or other apparent distress. HEENT: Head was atraumatic and normocephalic. Eyes demonstrated extraocular muscles appeared intact without scleral icterus or nystagmus. ENT demonstrated no rhinorrhea and moist mucosal membranes of the oropharynx with no redness or lesions. The neck did not demonstrate JVD. The thyroid was nonpalpable. CHEST: Chest is symmetric and nontender to palpation. LUNGS: The lungs were clear to auscultation bilaterally with no wheezes, crackles or rhonchi. HEART:  The precordial activity appeared normal.  No thrills or heaves were noted. On auscultation, the patient had normal S1 and S2 with regular rate and rhythm.  The second heart sound did split with inspiration. No murmur noted. No gallops, clicks or rubs were heard. Pulses were equal and symmetrical without pulse delay on all extremities. ABDOMEN: The abdomen was soft, nontender, nondistended, with no hepatosplenomegaly. EXTREMITIES: Warm and well-perfused, no clubbing, cyanosis or edema was seen. SKIN: The skin was intact and dry with no rashes or lesions. NEUROLOGY: Neurologic exam is grossly intact. STUDIES:    1. ECHO 5/17/18)  Normal cardiac structure, normal biventricular dimension and systolic function, no evidence of congenital heart disease     DIAGNOSES:  1. Stage II essential Hypertension  2. Obesity  3. ADHD and behavior disorder  4. Shortness of breath     RECOMMENDATIONS:   1. Continue Hyzaar (100/25mg) 1 tablet po q day and Amlodipine 10mg daily   2. Blood pressure check two times per week and make blood pressure diary  3. If blood pressure is > 160/90mmHg, he should contact cardiologist or PCP  4. Follow the guideline of DASH diet (Low fat/low energy diet)  to improve diet style  5. Exercise at least 5 days per week, 30~45mins per day with pulse or heart rate 120~140 bpm  6. Pediatric Cardiology follow up in 6 months with clinical evaluation    IMPRESSIONS AND DISCUSSIONS:   Na Cesar is a 21years old male who presents for reevaluation of obesity and hypertension. It is my impression that based on his blood pressure at home and clinic, his hypertension has been controlled well. I would like to keep him on  Amlodipine 10 mg daily and hyzaar at100/25mg daily at this time. He attempted to modify life style, at an result, he lost weight. I told Gege La again that his hypertension is associated with his obesity. I stressed the importance of compliance with antihypertensive medication and low fat and low energy diet. I asked him to decrease caloric intake, stop eating junk food and increase exercise. My recommendations are listed above.      Thank you for allowing me to participate in the patient's care. Please do not hesitate to contact me with additional questions or concerns in the future.        Sincerely,      Js Varela MD & PhD    Pediatric Cardiologist  [facility]  Division of Pediatric Cardiology  [facility]

## 2019-05-01 NOTE — COMMUNICATION BODY
CHIEF COMPLAINT: Jessica Vegas is a 21 y.o. male who is referred by Melissa Bah MD for evaluation of hypertension and obesity on 5/1/2019. HISTORY OF PRESENT ILLNESS: I had the opportunity to evaluate Jessica Vegas for a follow up visit per your request in the pediatric cardiology clinic on 5/1/2019. As you know, Doreen Hanna is a 21 y.o. young male who was accompanied by his mother re-evaluation of hypertension and obesity. His last visit with me was 6 months ago. According to the patient, since last appointment, he has maintained compliance with the anti-hypertensive medications including Hyzaar (100-25mg) daily and amlodipine (Norvasc) 10 mg daily. He had blood pressure check by the nurse at his [facility] once every week. He told me that his blood pressure was around 130/80 mmHg. He attempt improve his diet style and exercised more. Since last appointment, he had lost 7 pounds. Otherwise, he has been doing well and without symptoms referable to cardiovascular systems, denies difficulty breathing, chest pain, intolerance to exercise, palpitation, cyanosis and syncope, etc.     PAST MEDICAL HISTORY:   Past medical history remains unchanged. He has allergic rhinitis and ADHD. He has no known drug allergies.     Past Medical History:   Diagnosis Date    ADHD (attention deficit hyperactivity disorder) 4/4/2012    Allergic rhinitis 4/4/2012    Hypertension     Obesity (BMI 30-39.9) 4/4/2012     Current Outpatient Medications   Medication Sig Dispense Refill    losartan-hydrochlorothiazide (HYZAAR) 100-25 MG per tablet TAKE 1 TABLET BY MOUTH ONE TIME DAILY 30 tablet 3    amLODIPine (NORVASC) 10 MG tablet TAKE 1 TABLET BY MOUTH ONE TIME DAILY 30 tablet 0    ARIPiprazole (ABILIFY) 10 MG tablet TAKE 1 TABLET BY MOUTH EVERY DAY  2    benztropine (COGENTIN) 1 MG tablet Take 1 mg by mouth 2 times daily  0    divalproex (DEPAKOTE ER) 500 MG extended release tablet Take 1 tablet by mouth nightly 30 tablet 3    Blood Pressure Monitoring (ADULT BLOOD PRESSURE CUFF LG) KIT Check blood pressure every other day 1 kit 0     No current facility-administered medications for this visit. FAMILY/SOCIAL HISTORY:    Family history is unchanged from his previous visit. His mother has hypertension. He lives with his mother and sibling and is active in organized sports without symptoms. He denies alcohol, tobacco, illicit or illegal drug use. REVIEW OF SYSTEMS:    Constitutional: negative  HEENT: negative  Respiratory: Negative. Cardiovascular: As described in HPI   Gastrointestinal: negative  Genitourinary: Negative. Musculoskeletal: negative  Skin: negative  Neurological: Negative. Hematological: Negative. Psychiatric/Behavioral: Negative. All other systems reviewed and are negative. PHYSICAL EXAMINATION:   His blood pressures were checked at the end of this visit: 170/95mmHg. Vitals:    05/01/19 1419   BP: 139/79   Site: Right Upper Arm   Position: Sitting   Cuff Size: Large Adult   Pulse: 102   Resp: 24   SpO2: 100%   Weight: (!) 314 lb 12.8 oz (142.8 kg)   Height: 5' 5\" (1.651 m)     GENERAL: He appeared well-nourished and well-developed and did not appear to be in pain and in no respiratory or other apparent distress. HEENT: Head was atraumatic and normocephalic. Eyes demonstrated extraocular muscles appeared intact without scleral icterus or nystagmus. ENT demonstrated no rhinorrhea and moist mucosal membranes of the oropharynx with no redness or lesions. The neck did not demonstrate JVD. The thyroid was nonpalpable. CHEST: Chest is symmetric and nontender to palpation. LUNGS: The lungs were clear to auscultation bilaterally with no wheezes, crackles or rhonchi. HEART:  The precordial activity appeared normal.  No thrills or heaves were noted. On auscultation, the patient had normal S1 and S2 with regular rate and rhythm.  The second heart sound did split with inspiration. No murmur noted. No gallops, clicks or rubs were heard. Pulses were equal and symmetrical without pulse delay on all extremities. ABDOMEN: The abdomen was soft, nontender, nondistended, with no hepatosplenomegaly. EXTREMITIES: Warm and well-perfused, no clubbing, cyanosis or edema was seen. SKIN: The skin was intact and dry with no rashes or lesions. NEUROLOGY: Neurologic exam is grossly intact. STUDIES:    1. ECHO 5/17/18)  Normal cardiac structure, normal biventricular dimension and systolic function, no evidence of congenital heart disease     DIAGNOSES:  1. Stage II essential Hypertension  2. Obesity  3. ADHD and behavior disorder  4. Shortness of breath     RECOMMENDATIONS:   1. Continue Hyzaar (100/25mg) 1 tablet po q day and Amlodipine 10mg daily   2. Blood pressure check two times per week and make blood pressure diary  3. If blood pressure is > 160/90mmHg, he should contact cardiologist or PCP  4. Follow the guideline of DASH diet (Low fat/low energy diet)  to improve diet style  5. Exercise at least 5 days per week, 30~45mins per day with pulse or heart rate 120~140 bpm  6. Pediatric Cardiology follow up in 6 months with clinical evaluation    IMPRESSIONS AND DISCUSSIONS:   Chad Rahman is a 21years old male who presents for reevaluation of obesity and hypertension. It is my impression that based on his blood pressure at home and clinic, his hypertension has been controlled well. I would like to keep him on  Amlodipine 10 mg daily and hyzaar at100/25mg daily at this time. He attempted to modify life style, at an result, he lost weight. I told Ashley August again that his hypertension is associated with his obesity. I stressed the importance of compliance with antihypertensive medication and low fat and low energy diet. I asked him to decrease caloric intake, stop eating junk food and increase exercise. My recommendations are listed above.      Thank you for allowing me to

## 2019-06-03 DIAGNOSIS — E66.9 OBESITY (BMI 30-39.9): ICD-10-CM

## 2019-06-03 DIAGNOSIS — I10 ESSENTIAL HYPERTENSION: ICD-10-CM

## 2019-06-03 RX ORDER — AMLODIPINE BESYLATE 10 MG/1
TABLET ORAL
Qty: 30 TABLET | Refills: 0 | Status: SHIPPED | OUTPATIENT
Start: 2019-06-03 | End: 2019-07-10 | Stop reason: SDUPTHER

## 2019-07-10 DIAGNOSIS — E66.9 OBESITY (BMI 30-39.9): ICD-10-CM

## 2019-07-10 DIAGNOSIS — I10 ESSENTIAL HYPERTENSION: ICD-10-CM

## 2019-07-11 RX ORDER — AMLODIPINE BESYLATE 10 MG/1
TABLET ORAL
Qty: 30 TABLET | Refills: 0 | Status: SHIPPED | OUTPATIENT
Start: 2019-07-11 | End: 2019-08-14

## 2019-08-07 DIAGNOSIS — I10 ESSENTIAL HYPERTENSION: ICD-10-CM

## 2019-08-07 DIAGNOSIS — E66.9 OBESITY (BMI 30-39.9): ICD-10-CM

## 2019-08-07 RX ORDER — LOSARTAN POTASSIUM AND HYDROCHLOROTHIAZIDE 25; 100 MG/1; MG/1
TABLET ORAL
Qty: 30 TABLET | Refills: 3 | Status: SHIPPED | OUTPATIENT
Start: 2019-08-07 | End: 2019-11-06 | Stop reason: SDUPTHER

## 2019-08-13 DIAGNOSIS — E66.9 OBESITY (BMI 30-39.9): ICD-10-CM

## 2019-08-13 DIAGNOSIS — I10 ESSENTIAL HYPERTENSION: ICD-10-CM

## 2019-08-13 RX ORDER — AMLODIPINE BESYLATE 10 MG/1
TABLET ORAL
Qty: 30 TABLET | Refills: 3 | Status: CANCELLED | OUTPATIENT
Start: 2019-08-13

## 2019-08-14 ENCOUNTER — TELEPHONE (OUTPATIENT)
Dept: ADMINISTRATIVE | Age: 24
End: 2019-08-14

## 2019-08-14 DIAGNOSIS — I10 ESSENTIAL HYPERTENSION: ICD-10-CM

## 2019-08-14 DIAGNOSIS — E66.9 OBESITY (BMI 30-39.9): ICD-10-CM

## 2019-08-14 RX ORDER — AMLODIPINE BESYLATE 10 MG/1
TABLET ORAL
Qty: 30 TABLET | Refills: 3 | Status: SHIPPED | OUTPATIENT
Start: 2019-08-14 | End: 2019-11-04 | Stop reason: SDUPTHER

## 2019-08-19 RX ORDER — LOSARTAN POTASSIUM AND HYDROCHLOROTHIAZIDE 25; 100 MG/1; MG/1
TABLET ORAL
Qty: 30 TABLET | Refills: 3 | OUTPATIENT
Start: 2019-08-19

## 2019-09-03 RX ORDER — HYDROCHLOROTHIAZIDE 25 MG/1
25 TABLET ORAL EVERY MORNING
Qty: 30 TABLET | Refills: 5 | Status: SHIPPED | OUTPATIENT
Start: 2019-09-03 | End: 2019-11-06 | Stop reason: ALTCHOICE

## 2019-09-03 RX ORDER — LOSARTAN POTASSIUM 100 MG/1
100 TABLET ORAL DAILY
Qty: 30 TABLET | Refills: 5 | Status: SHIPPED | OUTPATIENT
Start: 2019-09-03 | End: 2019-11-06 | Stop reason: ALTCHOICE

## 2019-11-04 DIAGNOSIS — I10 ESSENTIAL HYPERTENSION: ICD-10-CM

## 2019-11-04 DIAGNOSIS — E66.9 OBESITY (BMI 30-39.9): ICD-10-CM

## 2019-11-04 RX ORDER — AMLODIPINE BESYLATE 10 MG/1
TABLET ORAL
Qty: 90 TABLET | Refills: 1 | Status: SHIPPED | OUTPATIENT
Start: 2019-11-04 | End: 2019-11-06 | Stop reason: SDUPTHER

## 2019-11-06 ENCOUNTER — OFFICE VISIT (OUTPATIENT)
Dept: PEDIATRIC CARDIOLOGY | Age: 24
End: 2019-11-06
Payer: COMMERCIAL

## 2019-11-06 VITALS
BODY MASS INDEX: 54.25 KG/M2 | DIASTOLIC BLOOD PRESSURE: 82 MMHG | RESPIRATION RATE: 18 BRPM | SYSTOLIC BLOOD PRESSURE: 140 MMHG | OXYGEN SATURATION: 98 % | HEART RATE: 96 BPM | WEIGHT: 315 LBS

## 2019-11-06 DIAGNOSIS — E66.9 OBESITY (BMI 30-39.9): ICD-10-CM

## 2019-11-06 DIAGNOSIS — I10 ESSENTIAL HYPERTENSION: Primary | ICD-10-CM

## 2019-11-06 PROCEDURE — 1036F TOBACCO NON-USER: CPT | Performed by: PEDIATRICS

## 2019-11-06 PROCEDURE — G8417 CALC BMI ABV UP PARAM F/U: HCPCS | Performed by: PEDIATRICS

## 2019-11-06 PROCEDURE — G8427 DOCREV CUR MEDS BY ELIG CLIN: HCPCS | Performed by: PEDIATRICS

## 2019-11-06 PROCEDURE — G8484 FLU IMMUNIZE NO ADMIN: HCPCS | Performed by: PEDIATRICS

## 2019-11-06 PROCEDURE — 99214 OFFICE O/P EST MOD 30 MIN: CPT | Performed by: PEDIATRICS

## 2019-11-06 PROCEDURE — 93000 ELECTROCARDIOGRAM COMPLETE: CPT | Performed by: PEDIATRICS

## 2019-11-06 RX ORDER — LOSARTAN POTASSIUM AND HYDROCHLOROTHIAZIDE 25; 100 MG/1; MG/1
TABLET ORAL
Qty: 90 TABLET | Refills: 1 | Status: CANCELLED | OUTPATIENT
Start: 2019-11-06

## 2019-11-06 RX ORDER — LOSARTAN POTASSIUM AND HYDROCHLOROTHIAZIDE 25; 100 MG/1; MG/1
TABLET ORAL
Qty: 90 TABLET | Refills: 3 | Status: SHIPPED | OUTPATIENT
Start: 2019-11-06 | End: 2020-08-05 | Stop reason: SDUPTHER

## 2019-11-06 RX ORDER — AMLODIPINE BESYLATE 10 MG/1
TABLET ORAL
Qty: 90 TABLET | Refills: 2 | Status: SHIPPED | OUTPATIENT
Start: 2019-11-06 | End: 2020-05-18

## 2019-11-11 DIAGNOSIS — E66.9 OBESITY (BMI 30-39.9): ICD-10-CM

## 2019-11-11 DIAGNOSIS — I10 ESSENTIAL HYPERTENSION: ICD-10-CM

## 2019-11-11 LAB
CHOLESTEROL, TOTAL: 150 MG/DL (ref 0–199)
HDLC SERPL-MCNC: 43 MG/DL (ref 40–59)
LDL CHOLESTEROL CALCULATED: 87 MG/DL (ref 0–129)
TRIGL SERPL-MCNC: 102 MG/DL (ref 0–150)

## 2020-01-13 RX ORDER — HYDROCHLOROTHIAZIDE 25 MG/1
TABLET ORAL
Qty: 90 TABLET | Refills: 1 | Status: SHIPPED | OUTPATIENT
Start: 2020-01-13 | End: 2020-04-29

## 2020-04-29 RX ORDER — HYDROCHLOROTHIAZIDE 25 MG/1
TABLET ORAL
Qty: 90 TABLET | Refills: 1 | Status: SHIPPED | OUTPATIENT
Start: 2020-04-29 | End: 2020-09-02

## 2020-05-18 RX ORDER — AMLODIPINE BESYLATE 10 MG/1
TABLET ORAL
Qty: 90 TABLET | Refills: 1 | Status: SHIPPED | OUTPATIENT
Start: 2020-05-18 | End: 2020-08-05 | Stop reason: SDUPTHER

## 2020-08-05 ENCOUNTER — OFFICE VISIT (OUTPATIENT)
Dept: PEDIATRIC CARDIOLOGY | Age: 25
End: 2020-08-05
Payer: COMMERCIAL

## 2020-08-05 VITALS
SYSTOLIC BLOOD PRESSURE: 146 MMHG | DIASTOLIC BLOOD PRESSURE: 92 MMHG | OXYGEN SATURATION: 97 % | HEART RATE: 94 BPM | WEIGHT: 310 LBS | BODY MASS INDEX: 51.59 KG/M2

## 2020-08-05 PROCEDURE — G8427 DOCREV CUR MEDS BY ELIG CLIN: HCPCS | Performed by: PEDIATRICS

## 2020-08-05 PROCEDURE — G8417 CALC BMI ABV UP PARAM F/U: HCPCS | Performed by: PEDIATRICS

## 2020-08-05 PROCEDURE — 1036F TOBACCO NON-USER: CPT | Performed by: PEDIATRICS

## 2020-08-05 PROCEDURE — 99214 OFFICE O/P EST MOD 30 MIN: CPT | Performed by: PEDIATRICS

## 2020-08-05 RX ORDER — AMLODIPINE BESYLATE 10 MG/1
10 TABLET ORAL DAILY
Qty: 90 TABLET | Refills: 1 | Status: SHIPPED | OUTPATIENT
Start: 2020-08-05 | End: 2020-09-03 | Stop reason: SDUPTHER

## 2020-08-05 RX ORDER — LOSARTAN POTASSIUM AND HYDROCHLOROTHIAZIDE 25; 100 MG/1; MG/1
TABLET ORAL
Qty: 90 TABLET | Refills: 3 | Status: SHIPPED | OUTPATIENT
Start: 2020-08-05 | End: 2020-09-02

## 2020-08-05 NOTE — LETTER
[facility] Pediatric Cardiology  [id number] [place] [address]    Jai Rogers MD        August 5, 2020     Rima Horan MD  [place] [id number]    Patient: Juana Puckett  MR Number: [id number]  YOB: 1995  Date of Visit: 8/5/2020    Dear Dr. Rima Horan: Thank you for the request for consultation for Saray Duffy to me for the evaluation of hypertension. Below are the relevant portions of my assessment and plan of care. CHIEF COMPLAINT: Juana Puckett is a 25 y.o. male who is referred by Rima Horan MD for evaluation of hypertension and obesity on 8/5/2020. HISTORY OF PRESENT ILLNESS: I had the opportunity to evaluate Juana Puckett for a follow up visit per your request in the pediatric cardiology clinic on 8/5/2020. As you know, Brea Terry is a 25 y.o. male who was accompanied by his mother re-evaluation of hypertension and obesity. His last visit with me was 11/72019. According to the patient, since last appointment, he has maintained compliance with the anti-hypertensive medications including Hyzaar (100-25mg) daily and amlodipine (Norvasc) 10 mg daily. Because of the Pandemic, He wasn't taken blood pressure check by the nurse at his [facility] as before. His mother reports that that sometimes she took his blood pressure at home. However, she didn't show me his blood pressure record. He didn't attempt improve his life style and continued gaining weight. Otherwise, he has been doing well and without symptoms referable to cardiovascular systems, denies difficulty breathing, chest pain, intolerance to exercise, palpitation, cyanosis and syncope, etc. He had CMP, CBC and lipid profile yesterday that were WNL. PAST MEDICAL HISTORY:   Past medical history remains unchanged. He has allergic rhinitis and ADHD. He has no known drug allergies.     Past Medical History:   Diagnosis Date  ADHD (attention deficit hyperactivity disorder) 4/4/2012    Allergic rhinitis 4/4/2012    Hypertension     Obesity (BMI 30-39.9) 4/4/2012     Current Outpatient Medications   Medication Sig Dispense Refill    amLODIPine (NORVASC) 10 MG tablet TAKE 1 TABLET BY MOUTH EVERY DAY 90 tablet 1    hydroCHLOROthiazide (HYDRODIURIL) 25 MG tablet TAKE 1 TABLET BY MOUTH EVERY DAY IN THE MORNING 90 tablet 1    losartan-hydrochlorothiazide (HYZAAR) 100-25 MG per tablet Take 1 tablet daily 90 tablet 3    ARIPiprazole (ABILIFY) 10 MG tablet TAKE 1 TABLET BY MOUTH EVERY DAY  2    benztropine (COGENTIN) 1 MG tablet Take 1 mg by mouth 2 times daily  0    divalproex (DEPAKOTE ER) 500 MG extended release tablet Take 1 tablet by mouth nightly 30 tablet 3    Blood Pressure Monitoring (ADULT BLOOD PRESSURE CUFF LG) KIT Check blood pressure every other day 1 kit 0     No current facility-administered medications for this visit. FAMILY/SOCIAL HISTORY:    Family history is unchanged from his previous visit. His mother has hypertension. He lives with his mother and sibling and is active in organized sports without symptoms. He denies alcohol, tobacco, illicit or illegal drug use. REVIEW OF SYSTEMS:    Constitutional: negative  HEENT: negative  Respiratory: Negative. Cardiovascular: As described in HPI   Gastrointestinal: negative  Genitourinary: Negative. Musculoskeletal: negative  Skin: negative  Neurological: Negative. Hematological: Negative. Psychiatric/Behavioral: Negative. All other systems reviewed and are negative. PHYSICAL EXAMINATION:   His blood pressures were checked at the end of this visit: 170/95mmHg.    Vitals:    08/05/20 1310 08/05/20 1324   BP: (!) 172/101 (!) 146/92   Site: Right Upper Arm Left Lower Arm   Position: Sitting Sitting   Cuff Size: Large Adult Large Adult   Pulse: 94    SpO2: 97%    Weight: (!) 310 lb (140.6 kg) GENERAL: He appeared well-nourished and well-developed and did not appear to be in pain and in no respiratory or other apparent distress. HEENT: Head was atraumatic and normocephalic. Eyes demonstrated extraocular muscles appeared intact without scleral icterus or nystagmus. ENT demonstrated no rhinorrhea and moist mucosal membranes of the oropharynx with no redness or lesions. The neck did not demonstrate JVD. The thyroid was nonpalpable. CHEST: Chest is symmetric and nontender to palpation. LUNGS: The lungs were clear to auscultation bilaterally with no wheezes, crackles or rhonchi. HEART:  The precordial activity appeared normal.  No thrills or heaves were noted. On auscultation, the patient had normal S1 and S2 with regular rate and rhythm. The second heart sound did split with inspiration. No murmur noted. No gallops, clicks or rubs were heard. Pulses were equal and symmetrical without pulse delay on all extremities. ABDOMEN: The abdomen was soft, nontender, nondistended, with no hepatosplenomegaly. EXTREMITIES: Warm and well-perfused, no clubbing, cyanosis or edema was seen. SKIN: The skin was intact and dry with no rashes or lesions. NEUROLOGY: Neurologic exam is grossly intact. STUDIES:    1. ECHO 5/17/18)  Normal cardiac structure, normal biventricular dimension and systolic function, no evidence of congenital heart disease     DIAGNOSES:  1. Stage II essential Hypertension  2. Obesity  3. ADHD and behavior disorder  4. Shortness of breath     RECOMMENDATIONS:   1. I discussed this diagnosis at length with the family who demonstrated good understanding   2. Continue Hyzaar (100/25mg) 1 tablet po q day and Amlodipine 10mg daily, prescriptions were sent to his Pharmacy today.    3. Blood pressure checks at home every other day      A) Before blood pressure checks, the patient should take a 10-15 min rest     B) Blood pressure should be checked 3 times

## 2020-08-05 NOTE — PROGRESS NOTES
(ABILIFY) 10 MG tablet TAKE 1 TABLET BY MOUTH EVERY DAY  2    benztropine (COGENTIN) 1 MG tablet Take 1 mg by mouth 2 times daily  0    divalproex (DEPAKOTE ER) 500 MG extended release tablet Take 1 tablet by mouth nightly 30 tablet 3    Blood Pressure Monitoring (ADULT BLOOD PRESSURE CUFF LG) KIT Check blood pressure every other day 1 kit 0     No current facility-administered medications for this visit. FAMILY/SOCIAL HISTORY:    Family history is unchanged from his previous visit. His mother has hypertension. He lives with his mother and sibling and is active in organized sports without symptoms. He denies alcohol, tobacco, illicit or illegal drug use. REVIEW OF SYSTEMS:    Constitutional: negative  HEENT: negative  Respiratory: Negative. Cardiovascular: As described in HPI   Gastrointestinal: negative  Genitourinary: Negative. Musculoskeletal: negative  Skin: negative  Neurological: Negative. Hematological: Negative. Psychiatric/Behavioral: Negative. All other systems reviewed and are negative. PHYSICAL EXAMINATION:   His blood pressures were checked at the end of this visit: 170/95mmHg. Vitals:    08/05/20 1310 08/05/20 1324   BP: (!) 172/101 (!) 146/92   Site: Right Upper Arm Left Lower Arm   Position: Sitting Sitting   Cuff Size: Large Adult Large Adult   Pulse: 94    SpO2: 97%    Weight: (!) 310 lb (140.6 kg)      GENERAL: He appeared well-nourished and well-developed and did not appear to be in pain and in no respiratory or other apparent distress. HEENT: Head was atraumatic and normocephalic. Eyes demonstrated extraocular muscles appeared intact without scleral icterus or nystagmus. ENT demonstrated no rhinorrhea and moist mucosal membranes of the oropharynx with no redness or lesions. The neck did not demonstrate JVD. The thyroid was nonpalpable. CHEST: Chest is symmetric and nontender to palpation.    LUNGS: The lungs were clear to auscultation bilaterally with no wheezes, crackles or rhonchi. HEART:  The precordial activity appeared normal.  No thrills or heaves were noted. On auscultation, the patient had normal S1 and S2 with regular rate and rhythm. The second heart sound did split with inspiration. No murmur noted. No gallops, clicks or rubs were heard. Pulses were equal and symmetrical without pulse delay on all extremities. ABDOMEN: The abdomen was soft, nontender, nondistended, with no hepatosplenomegaly. EXTREMITIES: Warm and well-perfused, no clubbing, cyanosis or edema was seen. SKIN: The skin was intact and dry with no rashes or lesions. NEUROLOGY: Neurologic exam is grossly intact. STUDIES:    1. ECHO 5/17/18)  Normal cardiac structure, normal biventricular dimension and systolic function, no evidence of congenital heart disease     DIAGNOSES:  1. Stage II essential Hypertension  2. Obesity  3. ADHD and behavior disorder  4. Shortness of breath     RECOMMENDATIONS:   1. I discussed this diagnosis at length with the family who demonstrated good understanding   2. Continue Hyzaar (100/25mg) 1 tablet po q day and Amlodipine 10mg daily, prescriptions were sent to his Pharmacy today. 3. Blood pressure checks at home every other day      A) Before blood pressure checks, the patient should take a 10-15 min rest     B) Blood pressure should be checked 3 times     C) All blood pressure values should be written down   4. If blood pressure is > 160/90mmHg, he should contact cardiologist or PCP  5. Follow the guideline of DASH diet (Low fat/low energy diet)  to improve diet style  6. Exercise at least 5 days per week, 30~45mins per day with pulse or heart rate 120~140 bpm  7. Pediatric Cardiology follow up in one month with clinical evaluation    IMPRESSIONS AND DISCUSSIONS:   Padmini Botello is a 22years old male who presents for reevaluation of obesity and hypertension. It is my impression that he didn't take blood pressure at home.  Based on his blood pressure checked at clinic today, his hypertension is not controlled at optimal range. I would like to keep him on  Amlodipine 10 mg daily and hyzaar at100/25mg daily at this time. I taught his mother how to take his blood pressure and stressed the importance of blood pressure check at home. I would like to see him back in one month. At that time, I will adjust his medications based on his blood pressure at home. He didn't attempted to modify life style, at an result, he gained weight again. I told Keith Reese again that his hypertension is associated with his obesity. I stressed the importance of compliance with antihypertensive medication and low fat and low energy diet. I asked him to decrease caloric intake, stop eating junk food and increase exercise. My recommendations are listed above. Thank you for allowing me to participate in the patient's care. Please do not hesitate to contact me with additional questions or concerns in the future.        Sincerely,      Clare Shields MD & PhD    Pediatric Cardiologist  [facility]  Division of Pediatric Cardiology  [facility]

## 2020-08-05 NOTE — LETTER
[facility] Pediatric Cardiology  [address] [facility]    Luis Rinaldi MD        August 5, 2020     Rita Kaur MD  [facility] [address]    Patient: Tima Miranda  MR Number: [id number]  YOB: 1995  Date of Visit: 8/5/2020    Dear Dr. Rita Kaur: Thank you for the request for consultation for Nicole Frankel to me for the evaluation of ***. Below are the relevant portions of my assessment and plan of care. [facility] Pediatric Cardiology  [address] [facility]    Hoda Browne MD        August 5, 2020       Patient: Tima Miranda   MR Number: [id number]   YOB: 1995   Date of Visit: 8/5/2020       Dear Dr. Nando Greenwood: Thank you for the request for consultation for Nicole Frankel to me for the evaluation of ***. Below are the relevant portions of my assessment and plan of care. If you have questions, please do not hesitate to call me. I look forward to following Katie Davenport along with you. Sincerely,        Luis Rinaldi MD & PhD    Pediatric Cardiologist  [facility] Professor of Pediatrics  Division of Pediatric Cardiology  [address] providers:  No Recipients           If you have questions, please do not hesitate to call me. I look forward to following Katie Davenport along with you.     Sincerely,        Luis Rinaldi MD

## 2020-08-05 NOTE — PATIENT INSTRUCTIONS
1. Blood pressure checks at home, school, or pharmacy once every week for 3 weeks     A) Before blood pressure checks, the patient should take a 10-15 min rest     B) Blood pressure should be checked 3 times     C) All blood pressure values should be written down   2. Follow the guidelines of the Therapeutic Lifestyle Change Diet and DASH diet to improve diet style  3. Exercise at least 5 days per week, 30-45 mins per day with pulse or heart rate at 140-160 bpm  4. No cardiac medication or SBE prophylaxis   5.   Cardiology follow up in 1 month with clinical evaluation

## 2020-08-06 ENCOUNTER — TELEPHONE (OUTPATIENT)
Dept: ADMINISTRATIVE | Age: 25
End: 2020-08-06

## 2020-09-02 ENCOUNTER — OFFICE VISIT (OUTPATIENT)
Dept: PEDIATRIC CARDIOLOGY | Age: 25
End: 2020-09-02
Payer: MEDICARE

## 2020-09-02 VITALS
DIASTOLIC BLOOD PRESSURE: 82 MMHG | WEIGHT: 315 LBS | BODY MASS INDEX: 53.18 KG/M2 | SYSTOLIC BLOOD PRESSURE: 138 MMHG | OXYGEN SATURATION: 94 % | HEART RATE: 84 BPM

## 2020-09-02 PROCEDURE — 99214 OFFICE O/P EST MOD 30 MIN: CPT | Performed by: PEDIATRICS

## 2020-09-02 RX ORDER — LOSARTAN POTASSIUM 100 MG/1
100 TABLET ORAL DAILY
Qty: 90 TABLET | Refills: 1 | Status: SHIPPED | OUTPATIENT
Start: 2020-09-02 | End: 2021-03-03 | Stop reason: SDUPTHER

## 2020-09-02 RX ORDER — LISINOPRIL 40 MG/1
40 TABLET ORAL DAILY
Qty: 90 TABLET | Refills: 1 | Status: SHIPPED | OUTPATIENT
Start: 2020-09-02 | End: 2020-09-02

## 2020-09-02 RX ORDER — HYDROCHLOROTHIAZIDE 25 MG/1
25 TABLET ORAL EVERY MORNING
Qty: 90 TABLET | Refills: 1 | Status: SHIPPED | OUTPATIENT
Start: 2020-09-02 | End: 2021-03-03 | Stop reason: SDUPTHER

## 2020-09-02 NOTE — PROGRESS NOTES
CHIEF COMPLAINT: Juana Puckett is a 22 y.o. male who is referred by Rima Horan MD for evaluation of hypertension and obesity on 9/2/2020. HISTORY OF PRESENT ILLNESS: I had the opportunity to evaluate Juana Puckett for a follow up visit per your request in the pediatric cardiology clinic on 9/2/2020. As you know, Brea Terry is a 22 y.o. young male who was accompanied by his mother re-evaluation of hypertension and obesity. His last visit with me was one month ago. According to the patient, since last appointment, he has maintained compliance with the anti-hypertensive medications including Losartan 100mg daily and Hydrochlorothiazide 25 mg daily. However, today he and his mother told me that he hasn't taken amlodipine (Norvasc) for a long time. His blood pressure record showed his blood pressures checked at home were between 125/100mg and 196/95 mmHg. He didn't attempt improve his life style and continued gaining weight. Otherwise, he has been doing well and without symptoms referable to cardiovascular systems, denies difficulty breathing, chest pain, intolerance to exercise, palpitation, cyanosis and syncope, etc.      PAST MEDICAL HISTORY:   Past medical history remains unchanged. He has allergic rhinitis and ADHD. He has no known drug allergies.     Past Medical History:   Diagnosis Date    ADHD (attention deficit hyperactivity disorder) 4/4/2012    Allergic rhinitis 4/4/2012    Hypertension     Obesity (BMI 30-39.9) 4/4/2012     Current Outpatient Medications   Medication Sig Dispense Refill    hydroCHLOROthiazide (HYDRODIURIL) 25 MG tablet Take 1 tablet by mouth every morning 90 tablet 1    losartan (COZAAR) 100 MG tablet Take 1 tablet by mouth daily 90 tablet 1    amLODIPine (NORVASC) 10 MG tablet Take 1 tablet by mouth daily 90 tablet 1    ARIPiprazole (ABILIFY) 10 MG tablet TAKE 1 TABLET BY MOUTH EVERY DAY  2    benztropine (COGENTIN) 1 MG tablet Take 1 mg by mouth 2 times daily  0    noted.  On auscultation, the patient had normal S1 and S2 with regular rate and rhythm. The second heart sound did split with inspiration. No murmur noted. No gallops, clicks or rubs were heard. Pulses were equal and symmetrical without pulse delay on all extremities. ABDOMEN: The abdomen was soft, nontender, nondistended, with no hepatosplenomegaly. EXTREMITIES: Warm and well-perfused, no clubbing, cyanosis or edema was seen. SKIN: The skin was intact and dry with no rashes or lesions. NEUROLOGY: Neurologic exam is grossly intact. STUDIES:    1. ECHO 5/17/18)  Normal cardiac structure, normal biventricular dimension and systolic function, no evidence of congenital heart disease     DIAGNOSES:  1. Stage II essential Hypertension  2. Obesity  3. ADHD and behavior disorder  4. Shortness of breath     RECOMMENDATIONS:   1. I discussed this diagnosis at length with the family who demonstrated good understanding   2. Antihypertensive medication: Losartan 100mg daily, hydrochlorothiazide 25 mg daily and Amlodipine 10mg daily, prescriptions were resent. 3. Blood pressure checks at home every other day      A) Before blood pressure checks, the patient should take a 10-15 min rest     B) Blood pressure should be checked 3 times     C) All blood pressure values should be written down   4. If blood pressure is > 160/90mmHg, he should contact cardiologist or PCP  5. Follow the guideline of DASH diet (Low fat/low energy diet)  to improve diet style  6. Exercise at least 5 days per week, 30~45mins per day with pulse or heart rate 120~140 bpm  7. Pediatric Cardiology follow up in 6 months with clinical evaluation  8. Sleep study is ordered     IMPRESSIONS AND DISCUSSIONS:   Claudette Sotelo is a 22years old male who presents for reevaluation of obesity and hypertension. It is my impression that his blood pressures checked at home are between 125/100 and 196/97 mmHg that are very variable.  However, he has been doing well without cardiac symptoms or headache. Therefore, I don't think that his blood pressure check at home is accurate. Today his blood pressure checked at clinic was between 138/82 and 149/85 mmHg. Therefore, I would like to keep him on Amlodipine 10 mg daily, Losartan 100 mg daily and Hydrochlorothiazide 25 mg daily. I told Chirag Rodriguez and her mother how to take the medication again. They told me that they have understood that hugh needs to take  Losartan 100mg daily, hydrochlorothiazide 25 mg daily and Amlodipine 10mg daily. I also resent the prescriptions to his pharmacy. I told Chirag Rodriguez again that his hypertension is associated with his obesity. I stressed the importance of compliance with antihypertensive medication and low fat and low energy diet. I asked him to decrease caloric intake, stop eating junk food and increase exercise. My recommendations are listed above. Thank you for allowing me to participate in the patient's care. Please do not hesitate to contact me with additional questions or concerns in the future.

## 2020-09-02 NOTE — LETTER
[facility] Pediatric Cardiology  [id number] [facility]    Maxwell De León MD      September 2, 2020     Dyllan Perez MD  [place] [address]    Patient: Francine Austin  MR Number: [id number]  YOB: 1995  Date of Visit: 9/2/2020    Dear Dr. Dyllan Perez: Thank you for the request for consultation for Haylee Todd to me for the evaluation of hypertension. Below are the relevant portions of my assessment and plan of care. CHIEF COMPLAINT: Francine Austin is a 22 y.o. male who is referred by Dyllan Perez MD for evaluation of hypertension and obesity on 9/2/2020. HISTORY OF PRESENT ILLNESS: I had the opportunity to evaluate Francine Austin for a follow up visit per your request in the pediatric cardiology clinic on 9/2/2020. As you know, Aura Middleton is a 22 y.o. young male who was accompanied by his mother re-evaluation of hypertension and obesity. His last visit with me was one month ago. According to the patient, since last appointment, he has maintained compliance with the anti-hypertensive medications including Losartan 100mg daily and Hydrochlorothiazide 25 mg daily. However, today he and his mother told me that he hasn't taken amlodipine (Norvasc) for a long time. His blood pressure record showed his blood pressures checked at home were between 125/100mg and 196/95 mmHg. He didn't attempt improve his life style and continued gaining weight. Otherwise, he has been doing well and without symptoms referable to cardiovascular systems, denies difficulty breathing, chest pain, intolerance to exercise, palpitation, cyanosis and syncope, etc.      PAST MEDICAL HISTORY:   Past medical history remains unchanged. He has allergic rhinitis and ADHD. He has no known drug allergies.     Past Medical History:   Diagnosis Date    ADHD (attention deficit hyperactivity disorder) 4/4/2012    Allergic rhinitis 4/4/2012    Hypertension  Obesity (BMI 30-39.9) 4/4/2012     Current Outpatient Medications   Medication Sig Dispense Refill    hydroCHLOROthiazide (HYDRODIURIL) 25 MG tablet Take 1 tablet by mouth every morning 90 tablet 1    losartan (COZAAR) 100 MG tablet Take 1 tablet by mouth daily 90 tablet 1    amLODIPine (NORVASC) 10 MG tablet Take 1 tablet by mouth daily 90 tablet 1    ARIPiprazole (ABILIFY) 10 MG tablet TAKE 1 TABLET BY MOUTH EVERY DAY  2    benztropine (COGENTIN) 1 MG tablet Take 1 mg by mouth 2 times daily  0    divalproex (DEPAKOTE ER) 500 MG extended release tablet Take 1 tablet by mouth nightly 30 tablet 3    Blood Pressure Monitoring (ADULT BLOOD PRESSURE CUFF LG) KIT Check blood pressure every other day 1 kit 0     No current facility-administered medications for this visit. FAMILY/SOCIAL HISTORY:    Family history is unchanged from his previous visit. His mother has hypertension. He lives with his mother and sibling and is active in organized sports without symptoms. He denies alcohol, tobacco, illicit or illegal drug use. REVIEW OF SYSTEMS:    Constitutional: negative  HEENT: negative  Respiratory: Negative. Cardiovascular: As described in HPI   Gastrointestinal: negative  Genitourinary: Negative. Musculoskeletal: negative  Skin: negative  Neurological: Negative. Hematological: Negative. Psychiatric/Behavioral: Negative. All other systems reviewed and are negative. PHYSICAL EXAMINATION:   His blood pressures were checked at the end of this visit: 170/95mmHg. Vitals:    09/02/20 1300 09/02/20 1302 09/02/20 1320   BP:  (!) 149/85 138/82   Site:  Left Upper Arm Right Upper Arm   Position:  Sitting Supine   Cuff Size:  Large Adult Large Adult   Pulse:  84 84   SpO2:  (!) 84% 94%   Weight: (!) 319 lb 9.6 oz (145 kg)       GENERAL: He appeared well-nourished and well-developed and did not appear to be in pain and in no respiratory or other apparent distress. HEENT: Head was atraumatic and normocephalic. Eyes demonstrated extraocular muscles appeared intact without scleral icterus or nystagmus. ENT demonstrated no rhinorrhea and moist mucosal membranes of the oropharynx with no redness or lesions. The neck did not demonstrate JVD. The thyroid was nonpalpable. CHEST: Chest is symmetric and nontender to palpation. LUNGS: The lungs were clear to auscultation bilaterally with no wheezes, crackles or rhonchi. HEART:  The precordial activity appeared normal.  No thrills or heaves were noted. On auscultation, the patient had normal S1 and S2 with regular rate and rhythm. The second heart sound did split with inspiration. No murmur noted. No gallops, clicks or rubs were heard. Pulses were equal and symmetrical without pulse delay on all extremities. ABDOMEN: The abdomen was soft, nontender, nondistended, with no hepatosplenomegaly. EXTREMITIES: Warm and well-perfused, no clubbing, cyanosis or edema was seen. SKIN: The skin was intact and dry with no rashes or lesions. NEUROLOGY: Neurologic exam is grossly intact. STUDIES:    1. ECHO 5/17/18)  Normal cardiac structure, normal biventricular dimension and systolic function, no evidence of congenital heart disease     DIAGNOSES:  1. Stage II essential Hypertension  2. Obesity  3. ADHD and behavior disorder  4. Shortness of breath     RECOMMENDATIONS:   1. I discussed this diagnosis at length with the family who demonstrated good understanding   2. Antihypertensive medication: Losartan 100mg daily, hydrochlorothiazide 25 mg daily and Amlodipine 10mg daily, prescriptions were resent. 3. Blood pressure checks at home every other day      A) Before blood pressure checks, the patient should take a 10-15 min rest     B) Blood pressure should be checked 3 times     C) All blood pressure values should be written down   4.  If blood pressure is > 160/90mmHg, he should contact cardiologist or PCP 5. Follow the guideline of DASH diet (Low fat/low energy diet)  to improve diet style  6. Exercise at least 5 days per week, 30~45mins per day with pulse or heart rate 120~140 bpm  7. Pediatric Cardiology follow up in 6 months with clinical evaluation  8. Sleep study is ordered     IMPRESSIONS AND DISCUSSIONS:   Hien Yu is a 22years old male who presents for reevaluation of obesity and hypertension. It is my impression that his blood pressures checked at home are between 125/100 and 196/97 mmHg that are very variable. However, he has been doing well without cardiac symptoms or headache. Therefore, I don't think that his blood pressure check at home is accurate. Today his blood pressure checked at clinic was between 138/82 and 149/85 mmHg. Therefore, I would like to keep him on Amlodipine 10 mg daily, Losartan 100 mg daily and Hydrochlorothiazide 25 mg daily. I told Archana Powell and her mother how to take the medication again. They told me that they have understood that hugh needs to take  Losartan 100mg daily, hydrochlorothiazide 25 mg daily and Amlodipine 10mg daily. I also resent the prescriptions to his pharmacy. I told Archana Powell again that his hypertension is associated with his obesity. I stressed the importance of compliance with antihypertensive medication and low fat and low energy diet. I asked him to decrease caloric intake, stop eating junk food and increase exercise. My recommendations are listed above. Thank you for allowing me to participate in the patient's care. Please do not hesitate to contact me with additional questions or concerns in the future.        Sincerely,      Daniela Newell MD & PhD    Pediatric Cardiologist  Abelino Goodman Professor of Pediatrics  Division of Pediatric Cardiology  [facility]

## 2020-09-03 RX ORDER — AMLODIPINE BESYLATE 10 MG/1
10 TABLET ORAL DAILY
Qty: 90 TABLET | Refills: 1 | Status: SHIPPED | OUTPATIENT
Start: 2020-09-03 | End: 2021-03-03 | Stop reason: SDUPTHER

## 2020-09-11 ENCOUNTER — TELEPHONE (OUTPATIENT)
Dept: PEDIATRIC CARDIOLOGY | Age: 25
End: 2020-09-11

## 2020-09-23 ENCOUNTER — TELEPHONE (OUTPATIENT)
Dept: ADMINISTRATIVE | Age: 25
End: 2020-09-23

## 2020-10-16 ENCOUNTER — HOSPITAL ENCOUNTER (OUTPATIENT)
Dept: SLEEP CENTER | Age: 25
Discharge: HOME | End: 2020-10-18
Payer: MEDICARE

## 2020-10-16 PROCEDURE — 95810 POLYSOM 6/> YRS 4/> PARAM: CPT | Performed by: INTERNAL MEDICINE

## 2020-10-16 PROCEDURE — 95810 POLYSOM 6/> YRS 4/> PARAM: CPT

## 2020-11-25 ENCOUNTER — OFFICE VISIT (OUTPATIENT)
Dept: PULMONOLOGY | Age: 25
End: 2020-11-25
Payer: MEDICARE

## 2020-11-25 VITALS
WEIGHT: 315 LBS | SYSTOLIC BLOOD PRESSURE: 128 MMHG | OXYGEN SATURATION: 97 % | TEMPERATURE: 97.6 F | HEART RATE: 91 BPM | BODY MASS INDEX: 52.48 KG/M2 | HEIGHT: 65 IN | DIASTOLIC BLOOD PRESSURE: 70 MMHG | RESPIRATION RATE: 16 BRPM

## 2020-11-25 PROBLEM — E66.01 MORBID OBESITY (HCC): Status: ACTIVE | Noted: 2018-05-03

## 2020-11-25 PROCEDURE — G8417 CALC BMI ABV UP PARAM F/U: HCPCS | Performed by: INTERNAL MEDICINE

## 2020-11-25 PROCEDURE — G8427 DOCREV CUR MEDS BY ELIG CLIN: HCPCS | Performed by: INTERNAL MEDICINE

## 2020-11-25 PROCEDURE — 99204 OFFICE O/P NEW MOD 45 MIN: CPT | Performed by: INTERNAL MEDICINE

## 2020-11-25 PROCEDURE — G8484 FLU IMMUNIZE NO ADMIN: HCPCS | Performed by: INTERNAL MEDICINE

## 2020-11-25 ASSESSMENT — ENCOUNTER SYMPTOMS
ABDOMINAL PAIN: 0
CHEST TIGHTNESS: 0
WHEEZING: 0
VOICE CHANGE: 0
SHORTNESS OF BREATH: 0
RHINORRHEA: 0
DIARRHEA: 0
SORE THROAT: 0
COUGH: 0
EYE ITCHING: 0
VOMITING: 0
NAUSEA: 0

## 2020-11-25 NOTE — PROGRESS NOTES
Subjective:     Jennie Strong is a 22 y.o. male who complains today of:     Chief Complaint   Patient presents with    Established New Doctor     referred by Rhina Loyd MD for Sleep Study results. Pt has Hx. of CHRISTOPHER . HPI:     New consult for sleep apnea. He has been diagnose with CHRISTOPHER in 2018  . He ad CPAP in 2018 for 1 month   He could not use mask before. Now he willing try again. He came with mother. Patient has PSG done on  10/16/20  an shows CHRISTOPHER. AHI  79.7 RDI 79.7 and O2 desaturation. he is complaining of snoring and daytime sleepiness and tiredness. C/o witness apnea. he does not wakes up with gasping for air. C/o wakes up frequently during sleep . No complaint of morning headache.  he  does  have restful sleep.  he does  fall asleep while watching TV as per mother. he  does not drive. he does not have difficulty falling sleep, but  staying asleep   20 lbs weight gain in last 6 -12 month   No sleep walking or eating   No sleep onset hallucination. No sleep paralysis  . No sleep attacks    He has Bipolar disorder on abilify and depakot    Allergies:  Patient has no known allergies.   Past Medical History:   Diagnosis Date    ADHD (attention deficit hyperactivity disorder) 4/4/2012    Allergic rhinitis 4/4/2012    Hypertension     Obesity (BMI 30-39.9) 4/4/2012    Sleep apnea      Past Surgical History:   Procedure Laterality Date    TONSILLECTOMY       Family History   Problem Relation Age of Onset    No Known Problems Mother     No Known Problems Father      Social History     Socioeconomic History    Marital status: Single     Spouse name: Not on file    Number of children: Not on file    Years of education: Not on file    Highest education level: Not on file   Occupational History    Not on file   Social Needs    Financial resource strain: Not on file    Food insecurity     Worry: Not on file     Inability: Not on file    Transportation needs     Medical: Not on (ABILIFY) 10 MG tablet TAKE 1 TABLET BY MOUTH EVERY DAY  2    benztropine (COGENTIN) 1 MG tablet Take 1 mg by mouth 2 times daily  0    divalproex (DEPAKOTE ER) 500 MG extended release tablet Take 1 tablet by mouth nightly 30 tablet 3    Blood Pressure Monitoring (ADULT BLOOD PRESSURE CUFF LG) KIT Check blood pressure every other day 1 kit 0     No current facility-administered medications for this visit. Assessment/Plan:     1. CHRISTOPHER (obstructive sleep apnea)  He  has PSG done on  10/16/20  an shows CHRISTOPHER. AHI  79.7 RDI 79.7 and O2 desaturation. Sleep study reviewed with patient and mother. he is complaining of snoring and daytime sleepiness and tiredness. C/o witness apnea. C/o wakes up frequently during sleep .  he  does  have restful sleep. he does  fall asleep while watching TV as per mother. Counseling:  Sleep hygiene:Avoid supine sleep, sleep on  sides. Avoid  sleep deprivation. Explained sleep hygiene. Advice to avoid Alcohol and sedative    2. Morbid obesity ([address]  Patient patient is advised try to lose weight. obesity related risk explained to the patient ,  Current weight:  (!) 318 lb (144.2 kg) Lbs. BMI:  Body mass index is 52.92 kg/m². Suggested weight control approaches, including dietary changes , exercise, behavioral modification. Return in about 3 weeks (around 12/16/2020) for christopher, sleep study.       Colten Shah MD

## 2020-12-02 ENCOUNTER — HOSPITAL ENCOUNTER (OUTPATIENT)
Dept: SLEEP CENTER | Age: 25
Discharge: HOME | End: 2020-12-04
Payer: MEDICARE

## 2020-12-02 PROCEDURE — 95811 POLYSOM 6/>YRS CPAP 4/> PARM: CPT

## 2020-12-02 PROCEDURE — 95811 POLYSOM 6/>YRS CPAP 4/> PARM: CPT | Performed by: INTERNAL MEDICINE

## 2021-01-05 ENCOUNTER — OFFICE VISIT (OUTPATIENT)
Dept: PULMONOLOGY | Age: 26
End: 2021-01-05
Payer: MEDICARE

## 2021-01-05 VITALS
TEMPERATURE: 97.3 F | RESPIRATION RATE: 16 BRPM | BODY MASS INDEX: 52.48 KG/M2 | HEIGHT: 65 IN | SYSTOLIC BLOOD PRESSURE: 124 MMHG | HEART RATE: 113 BPM | DIASTOLIC BLOOD PRESSURE: 62 MMHG | WEIGHT: 315 LBS | OXYGEN SATURATION: 98 %

## 2021-01-05 DIAGNOSIS — E66.01 MORBID OBESITY (HCC): ICD-10-CM

## 2021-01-05 DIAGNOSIS — G47.33 OSA (OBSTRUCTIVE SLEEP APNEA): Primary | ICD-10-CM

## 2021-01-05 PROCEDURE — G8484 FLU IMMUNIZE NO ADMIN: HCPCS | Performed by: INTERNAL MEDICINE

## 2021-01-05 PROCEDURE — G8417 CALC BMI ABV UP PARAM F/U: HCPCS | Performed by: INTERNAL MEDICINE

## 2021-01-05 PROCEDURE — G8427 DOCREV CUR MEDS BY ELIG CLIN: HCPCS | Performed by: INTERNAL MEDICINE

## 2021-01-05 PROCEDURE — 1036F TOBACCO NON-USER: CPT | Performed by: INTERNAL MEDICINE

## 2021-01-05 PROCEDURE — 99214 OFFICE O/P EST MOD 30 MIN: CPT | Performed by: INTERNAL MEDICINE

## 2021-01-05 ASSESSMENT — ENCOUNTER SYMPTOMS
VOMITING: 0
EYE ITCHING: 0
NAUSEA: 0
CHEST TIGHTNESS: 0
VOICE CHANGE: 0
RHINORRHEA: 0
SHORTNESS OF BREATH: 0
WHEEZING: 0
DIARRHEA: 0
SORE THROAT: 0
ABDOMINAL PAIN: 0
COUGH: 0

## 2021-03-03 ENCOUNTER — VIRTUAL VISIT (OUTPATIENT)
Dept: PEDIATRIC CARDIOLOGY | Age: 26
End: 2021-03-03
Payer: MEDICARE

## 2021-03-03 DIAGNOSIS — E66.9 OBESITY (BMI 30-39.9): ICD-10-CM

## 2021-03-03 DIAGNOSIS — I10 ESSENTIAL HYPERTENSION: ICD-10-CM

## 2021-03-03 PROCEDURE — G8417 CALC BMI ABV UP PARAM F/U: HCPCS | Performed by: PEDIATRICS

## 2021-03-03 PROCEDURE — 1036F TOBACCO NON-USER: CPT | Performed by: PEDIATRICS

## 2021-03-03 PROCEDURE — G8484 FLU IMMUNIZE NO ADMIN: HCPCS | Performed by: PEDIATRICS

## 2021-03-03 PROCEDURE — 99214 OFFICE O/P EST MOD 30 MIN: CPT | Performed by: PEDIATRICS

## 2021-03-03 PROCEDURE — G8427 DOCREV CUR MEDS BY ELIG CLIN: HCPCS | Performed by: PEDIATRICS

## 2021-03-03 RX ORDER — HYDROCHLOROTHIAZIDE 25 MG/1
25 TABLET ORAL EVERY MORNING
Qty: 90 TABLET | Refills: 1 | Status: SHIPPED | OUTPATIENT
Start: 2021-03-03 | End: 2021-10-26

## 2021-03-03 RX ORDER — AMLODIPINE BESYLATE 10 MG/1
10 TABLET ORAL DAILY
Qty: 90 TABLET | Refills: 1 | Status: SHIPPED | OUTPATIENT
Start: 2021-03-03 | End: 2021-12-09

## 2021-03-03 RX ORDER — LOSARTAN POTASSIUM 100 MG/1
100 TABLET ORAL DAILY
Qty: 90 TABLET | Refills: 1 | Status: SHIPPED | OUTPATIENT
Start: 2021-03-03 | End: 2021-10-12

## 2021-03-03 NOTE — LETTER
[facility]  [address]  [address] [place] [id number]  Phone: [phone]  Fax: [phone]    Jayme Maria MD      March 3, 2021     Melony Dennis MD  [facility] [place] [address]    Patient: Rebecca Arvizu  MR Number: [id number]  YOB: 1995  Date of Visit: 3/3/2021    Dear Dr. Melony Dennis: Thank you for the request for consultation for Yumiko Ch to me for the evaluation of hypertension. Below are the relevant portions of my assessment and plan of care. 3/3/2021    TELEHEALTH EVALUATION -- Audio/Visual (During [facility]- public health emergency)    HPI: Rebecca Arvizu (:  1995) has requested an audio/video evaluation for the following concern(s): hypertension and obesity. His last visit with me was 6 months ago. According to the patient, since last appointment, he has maintained compliance with the anti-hypertensive medications including Losartan 100mg daily and Hydrochlorothiazide 25 mg daily as well as amlodipine 10 mg daily. He reported that he had blood pressure at home once a week and blood pressures was around 135/68 mmHg. He didn't attempt improve his life style and continued gaining weight. Otherwise, he has been doing well and without symptoms referable to cardiovascular systems, denies difficulty breathing, chest pain, intolerance to exercise, palpitation, cyanosis and syncope, etc.      PAST MEDICAL HISTORY:   Past medical history remains unchanged. He has allergic rhinitis and ADHD. He has no known drug allergies.     Past Medical History:   Diagnosis Date    ADHD (attention deficit hyperactivity disorder) 2012    Allergic rhinitis 2012    Hypertension     Obesity (BMI 30-39.9) 2012    Sleep apnea      Current Outpatient Medications   Medication Sig Dispense Refill    amLODIPine (NORVASC) 10 MG tablet Take 1 tablet by mouth daily 90 tablet 1  hydroCHLOROthiazide (HYDRODIURIL) 25 MG tablet Take 1 tablet by mouth every morning 90 tablet 1    losartan (COZAAR) 100 MG tablet Take 1 tablet by mouth daily 90 tablet 1    ARIPiprazole (ABILIFY) 10 MG tablet TAKE 1 TABLET BY MOUTH EVERY DAY  2    benztropine (COGENTIN) 1 MG tablet Take 1 mg by mouth 2 times daily  0    divalproex (DEPAKOTE ER) 500 MG extended release tablet Take 1 tablet by mouth nightly 30 tablet 3    Blood Pressure Monitoring (ADULT BLOOD PRESSURE CUFF LG) KIT Check blood pressure every other day 1 kit 0     No current facility-administered medications for this visit. FAMILY/SOCIAL HISTORY:    Family history is unchanged from his previous visit. His mother has hypertension. He lives with his mother and sibling and is active in organized sports without symptoms. He denies alcohol, tobacco, illicit or illegal drug use. REVIEW OF SYSTEMS:    Constitutional: negative  HEENT: negative  Respiratory: Negative. Cardiovascular: As described in HPI   Gastrointestinal: negative  Genitourinary: Negative. Musculoskeletal: negative  Skin: negative  Neurological: Negative. Hematological: Negative. Psychiatric/Behavioral: Negative. All other systems reviewed and are negative. PHYSICAL EXAMINATION:  [ INSTRUCTIONS:  \"[x]\" Indicates a positive item  \"[]\" Indicates a negative item  -- DELETE ALL ITEMS NOT EXAMINED]  Vital Signs: (As obtained by patient/caregiver or practitioner observation)    Blood pressure-  135/68 mmHg Heart rate-    Respiratory rate-    Temperature-  Pulse oximetry-     Constitutional: [x] Appears well-developed and well-nourished [x] No apparent distress      [] Abnormal-   Mental status  [x] Alert and awake  [x] Oriented to person/place/time [x]Able to follow commands      Eyes:  EOM    [x]  Normal  [] Abnormal-  Sclera  [x]  Normal  [] Abnormal -         Discharge [x]  None visible  [] Abnormal -    HENT:   [x] Normocephalic, atraumatic.   [] Abnormal [x] Mouth/Throat: Mucous membranes are moist.     External Ears [x] Normal  [] Abnormal-     Neck: [x] No visualized mass     Pulmonary/Chest: [x] Respiratory effort normal.  [x] No visualized signs of difficulty breathing or respiratory distress        [] Abnormal-      Musculoskeletal:   [x] Normal gait with no signs of ataxia         [x] Normal range of motion of neck        [] Abnormal-       Neurological:        [x] No Facial Asymmetry (Cranial nerve 7 motor function) (limited exam to video visit)          [x] No gaze palsy        [] Abnormal-         Skin:        [x] No significant exanthematous lesions or discoloration noted on facial skin         [] Abnormal-            Psychiatric:       [x] Normal Affect [x] No Hallucinations        [] Abnormal-     Other pertinent observable physical exam findings-     STUDIES:    1. ECHO 5/17/18)  Normal cardiac structure, normal biventricular dimension and systolic function, no evidence of congenital heart disease     DIAGNOSES:  1. Stage II essential Hypertension  2. Obesity  3. ADHD and behavior disorder  4. Shortness of breath     RECOMMENDATIONS:   1. I discussed this diagnosis at length with the family who demonstrated good understanding   2. Antihypertensive medication: Losartan 100mg daily, hydrochlorothiazide 25 mg daily and Amlodipine 10mg daily, prescriptions were resent. 3. Blood pressure checks at home every other day   4. If blood pressure is > 160/90mmHg, he should contact cardiologist or PCP  5. Follow the guideline of DASH diet (Low fat/low energy diet)  to improve diet style  6. Exercise at least 5 days per week, 30~45mins per day with pulse or heart rate 120~140 bpm  7.  Pediatric Cardiology follow up in 6 months with clinical evaluation    IMPRESSIONS AND DISCUSSIONS: Gregory Ellsworth is a 22years old male who presents for reevaluation of obesity and hypertension. It is my impression that his blood pressure is under good control. Therefore, I would like to keep him on current antihypertensive medications at current dosage. I told Malcolm Flynn again that his hypertension is associated with his obesity. I stressed the importance of compliance with antihypertensive medication and low fat and low energy diet. I asked him to decrease caloric intake, stop eating junk food and increase exercise. My recommendations are listed above. Thank you for allowing me to participate in the patient's care. Please do not hesitate to contact me with additional questions or concerns in the future. --Vicki Ybarra MD on 3/3/2021 at 4:58 PM    An electronic signature was used to authenticate this note. If you have questions, please do not hesitate to call me. I look forward to following Malcolm Flynn along with you.     Sincerely,    Vicki Ybarra MD

## 2021-03-03 NOTE — PROGRESS NOTES
3/3/2021    TELEHEALTH EVALUATION -- Audio/Visual (During [facility]-24 public health emergency)    HPI: Rebecca Arvizu (:  1995) has requested an audio/video evaluation for the following concern(s): hypertension and obesity. His last visit with me was 6 months ago. According to the patient, since last appointment, he has maintained compliance with the anti-hypertensive medications including Losartan 100mg daily and Hydrochlorothiazide 25 mg daily as well as amlodipine 10 mg daily. He reported that he had blood pressure at home once a week and blood pressures was around 135/68 mmHg. He didn't attempt improve his life style and continued gaining weight. Otherwise, he has been doing well and without symptoms referable to cardiovascular systems, denies difficulty breathing, chest pain, intolerance to exercise, palpitation, cyanosis and syncope, etc.      PAST MEDICAL HISTORY:   Past medical history remains unchanged. He has allergic rhinitis and ADHD. He has no known drug allergies.     Past Medical History:   Diagnosis Date    ADHD (attention deficit hyperactivity disorder) 2012    Allergic rhinitis 2012    Hypertension     Obesity (BMI 30-39.9) 2012    Sleep apnea      Current Outpatient Medications   Medication Sig Dispense Refill    amLODIPine (NORVASC) 10 MG tablet Take 1 tablet by mouth daily 90 tablet 1    hydroCHLOROthiazide (HYDRODIURIL) 25 MG tablet Take 1 tablet by mouth every morning 90 tablet 1    losartan (COZAAR) 100 MG tablet Take 1 tablet by mouth daily 90 tablet 1    ARIPiprazole (ABILIFY) 10 MG tablet TAKE 1 TABLET BY MOUTH EVERY DAY  2    benztropine (COGENTIN) 1 MG tablet Take 1 mg by mouth 2 times daily  0    divalproex (DEPAKOTE ER) 500 MG extended release tablet Take 1 tablet by mouth nightly 30 tablet 3    Blood Pressure Monitoring (ADULT BLOOD PRESSURE CUFF LG) KIT Check blood pressure every other day 1 kit 0 [] Abnormal-         Skin:        [x] No significant exanthematous lesions or discoloration noted on facial skin         [] Abnormal-            Psychiatric:       [x] Normal Affect [x] No Hallucinations        [] Abnormal-     Other pertinent observable physical exam findings-     STUDIES:    1. ECHO 5/17/18)  Normal cardiac structure, normal biventricular dimension and systolic function, no evidence of congenital heart disease     DIAGNOSES:  1. Stage II essential Hypertension  2. Obesity  3. ADHD and behavior disorder  4. Shortness of breath     RECOMMENDATIONS:   1. I discussed this diagnosis at length with the family who demonstrated good understanding   2. Antihypertensive medication: Losartan 100mg daily, hydrochlorothiazide 25 mg daily and Amlodipine 10mg daily, prescriptions were resent. 3. Blood pressure checks at home every other day   4. If blood pressure is > 160/90mmHg, he should contact cardiologist or PCP  5. Follow the guideline of DASH diet (Low fat/low energy diet)  to improve diet style  6. Exercise at least 5 days per week, 30~45mins per day with pulse or heart rate 120~140 bpm  7. Pediatric Cardiology follow up in 6 months with clinical evaluation    IMPRESSIONS AND DISCUSSIONS:   Aleksandra Jorgensen is a 22years old male who presents for reevaluation of obesity and hypertension. It is my impression that his blood pressure is under good control. Therefore, I would like to keep him on current antihypertensive medications at current dosage. I told [employer] again that his hypertension is associated with his obesity. I stressed the importance of compliance with antihypertensive medication and low fat and low energy diet. I asked him to decrease caloric intake, stop eating junk food and increase exercise. My recommendations are listed above.

## 2021-03-11 ENCOUNTER — OFFICE VISIT (OUTPATIENT)
Dept: PULMONOLOGY | Age: 26
End: 2021-03-11
Payer: MEDICARE

## 2021-03-11 VITALS
DIASTOLIC BLOOD PRESSURE: 72 MMHG | BODY MASS INDEX: 52.48 KG/M2 | HEIGHT: 65 IN | WEIGHT: 315 LBS | OXYGEN SATURATION: 98 % | RESPIRATION RATE: 18 BRPM | SYSTOLIC BLOOD PRESSURE: 124 MMHG | TEMPERATURE: 97 F | HEART RATE: 102 BPM

## 2021-03-11 DIAGNOSIS — E66.01 MORBID OBESITY (HCC): ICD-10-CM

## 2021-03-11 DIAGNOSIS — G47.33 OSA (OBSTRUCTIVE SLEEP APNEA): Primary | ICD-10-CM

## 2021-03-11 PROCEDURE — G8427 DOCREV CUR MEDS BY ELIG CLIN: HCPCS | Performed by: INTERNAL MEDICINE

## 2021-03-11 PROCEDURE — 1036F TOBACCO NON-USER: CPT | Performed by: INTERNAL MEDICINE

## 2021-03-11 PROCEDURE — G8417 CALC BMI ABV UP PARAM F/U: HCPCS | Performed by: INTERNAL MEDICINE

## 2021-03-11 PROCEDURE — G8484 FLU IMMUNIZE NO ADMIN: HCPCS | Performed by: INTERNAL MEDICINE

## 2021-03-11 PROCEDURE — 99214 OFFICE O/P EST MOD 30 MIN: CPT | Performed by: INTERNAL MEDICINE

## 2021-03-11 ASSESSMENT — ENCOUNTER SYMPTOMS
CHEST TIGHTNESS: 0
SORE THROAT: 0
EYE ITCHING: 0
DIARRHEA: 0
COUGH: 0
RHINORRHEA: 0
VOICE CHANGE: 0
SHORTNESS OF BREATH: 0
NAUSEA: 0
ABDOMINAL PAIN: 0
WHEEZING: 0
VOMITING: 0

## 2021-03-11 NOTE — PROGRESS NOTES
Subjective:     Karyle Fine is a 22 y.o. male who complains today of:     Chief Complaint   Patient presents with    Follow-up     two month f/u for CHRISTOPHER. HPI  Patient is using BiPAP with  I 14 and E10  centimeters of H2O with heated humidity. He is using BiPAP for about  1-4   hours , he is not using every night. He is using BiPAP with  Full face mask  He said  sleep is restful with the BiPAP use but he is not compliant. No snoring with BiPAP use  No early morning headache. He is taking daily naps. He said he will try to use every night and also use with nap    Compliance report show he use 18/30 days  Average usage days used  1 hour 52 min    Allergies:  Patient has no known allergies.   Past Medical History:   Diagnosis Date    ADHD (attention deficit hyperactivity disorder) 2012    Allergic rhinitis 2012    Hypertension     Obesity (BMI 30-39.9) 2012    Sleep apnea      Past Surgical History:   Procedure Laterality Date    TONSILLECTOMY       Family History   Problem Relation Age of Onset    No Known Problems Mother     No Known Problems Father      Social History     Socioeconomic History    Marital status: Single     Spouse name: Not on file    Number of children: Not on file    Years of education: Not on file    Highest education level: Not on file   Occupational History    Not on file   Social Needs    Financial resource strain: Not on file    Food insecurity     Worry: Not on file     Inability: Not on file    Transportation needs     Medical: Not on file     Non-medical: Not on file   Tobacco Use    Smoking status: Former Smoker     Packs/day: 0.50     Years: 7.00     Pack years: 3.50     Types: Cigarettes     Start date: 2013     Quit date: 2018     Years since quittin.7    Smokeless tobacco: Never Used   Substance and Sexual Activity    Alcohol use: Yes     Comment: occ    Drug use: No    Sexual activity: Not on file   Lifestyle    Physical activity     Days per week: Not on file     Minutes per session: Not on file    Stress: Not on file   Relationships    Social connections     Talks on phone: Not on file     Gets together: Not on file     Attends Latter-day service: Not on file     Active member of club or organization: Not on file     Attends meetings of clubs or organizations: Not on file     Relationship status: Not on file    Intimate partner violence     Fear of current or ex partner: Not on file     Emotionally abused: Not on file     Physically abused: Not on file     Forced sexual activity: Not on file   Other Topics Concern    Not on file   Social History Narrative    Not on file         Review of Systems   Constitutional: Negative for chills, diaphoresis, fatigue and fever. HENT: Negative for congestion, mouth sores, nosebleeds, postnasal drip, rhinorrhea, sneezing, sore throat and voice change. Eyes: Negative for itching and visual disturbance. Respiratory: Negative for cough, chest tightness, shortness of breath and wheezing. Cardiovascular: Negative. Negative for chest pain, palpitations and leg swelling. Gastrointestinal: Negative for abdominal pain, diarrhea, nausea and vomiting. Genitourinary: Negative for difficulty urinating and hematuria. Musculoskeletal: Negative for arthralgias, joint swelling and myalgias. Skin: Negative for rash. Allergic/Immunologic: Negative for environmental allergies. Neurological: Negative for dizziness, tremors, weakness and headaches. Psychiatric/Behavioral: Positive for sleep disturbance. Negative for behavioral problems.          :     Vitals:    03/11/21 1530   BP: 124/72   Pulse: 102   Resp: 18   Temp: 97 °F (36.1 °C)   SpO2: 98%   Weight: (!) 324 lb (147 kg)   Height: 5' 5\" (1.651 m)     Wt Readings from Last 3 Encounters:   03/11/21 (!) 324 lb (147 kg)   01/05/21 (!) 326 lb (147.9 kg)   11/25/20 (!) 318 lb (144.2 kg)         Physical Exam  Constitutional: Appearance: He is well-developed. He is obese. HENT:      Head: Normocephalic and atraumatic. Nose: Nose normal.   Eyes:      Conjunctiva/sclera: Conjunctivae normal.      Pupils: Pupils are equal, round, and reactive to light. Neck:      Thyroid: No thyromegaly. Vascular: No JVD. Trachea: No tracheal deviation. Cardiovascular:      Rate and Rhythm: Normal rate and regular rhythm. Heart sounds: No murmur. No friction rub. No gallop. Pulmonary:      Effort: Pulmonary effort is normal. No respiratory distress. Breath sounds: Normal breath sounds. No wheezing or rales. Chest:      Chest wall: No tenderness. Abdominal:      General: There is no distension. Musculoskeletal: Normal range of motion. Lymphadenopathy:      Cervical: No cervical adenopathy. Skin:     General: Skin is warm and dry. Findings: No rash. Neurological:      Mental Status: He is alert and oriented to person, place, and time. Cranial Nerves: No cranial nerve deficit. Psychiatric:         Behavior: Behavior normal.         Current Outpatient Medications   Medication Sig Dispense Refill    amLODIPine (NORVASC) 10 MG tablet Take 1 tablet by mouth daily 90 tablet 1    hydroCHLOROthiazide (HYDRODIURIL) 25 MG tablet Take 1 tablet by mouth every morning 90 tablet 1    losartan (COZAAR) 100 MG tablet Take 1 tablet by mouth daily 90 tablet 1    ARIPiprazole (ABILIFY) 10 MG tablet TAKE 1 TABLET BY MOUTH EVERY DAY  2    benztropine (COGENTIN) 1 MG tablet Take 1 mg by mouth 2 times daily  0    divalproex (DEPAKOTE ER) 500 MG extended release tablet Take 1 tablet by mouth nightly 30 tablet 3    Blood Pressure Monitoring (ADULT BLOOD PRESSURE CUFF LG) KIT Check blood pressure every other day 1 kit 0     No current facility-administered medications for this visit.         Assessment/Plan:     1. CHRISTOPHER (obstructive sleep apnea)  He  is using BiPAP with  I 14 and E10  centimeters of H2O with heated humidity. He is using BiPAP for about  1-4   hours , he is also not using every night. He is using BiPAP with  Full face mask . He said  sleep is restful with the BiPAP use but he is not compliant. No snoring with BiPAP use. He said he will try to use every night and also use with nap. Compliance report show he use 18/30 days  Average usage days used  1 hour 52 min    Counseling: CPAP/BiPAP uses, He advised to use CPAP at least 5-6 hours every night. Sleep hygiene:Avoid supine sleep, sleep on  sides. Avoid  sleep deprivation. Explained sleep hygiene. Advice to avoid Alcohol and sedative    Time spend over 30 min. Face to face. with greater than 50 % time with counseling regarding CPAP therapy. 2. Morbid obesity ([address]  He is advised try to lose weight. obesity related risk explained to the patient ,  Current weight:  (!) 324 lb (147 kg) Lbs. BMI:  Body mass index is 53.92 kg/m². Suggested weight control approaches, including dietary changes , exercise, behavioral modification. Return in about 2 months (around 5/11/2021) for ry.       Jenni Calles MD

## 2021-05-11 ENCOUNTER — OFFICE VISIT (OUTPATIENT)
Dept: PULMONOLOGY | Age: 26
End: 2021-05-11
Payer: MEDICARE

## 2021-05-11 VITALS
SYSTOLIC BLOOD PRESSURE: 137 MMHG | OXYGEN SATURATION: 98 % | HEART RATE: 135 BPM | BODY MASS INDEX: 52.48 KG/M2 | WEIGHT: 315 LBS | DIASTOLIC BLOOD PRESSURE: 65 MMHG | HEIGHT: 65 IN | TEMPERATURE: 98.5 F

## 2021-05-11 DIAGNOSIS — E66.01 MORBID OBESITY (HCC): ICD-10-CM

## 2021-05-11 DIAGNOSIS — G47.33 OSA (OBSTRUCTIVE SLEEP APNEA): Primary | ICD-10-CM

## 2021-05-11 PROCEDURE — G8417 CALC BMI ABV UP PARAM F/U: HCPCS | Performed by: INTERNAL MEDICINE

## 2021-05-11 PROCEDURE — G8427 DOCREV CUR MEDS BY ELIG CLIN: HCPCS | Performed by: INTERNAL MEDICINE

## 2021-05-11 PROCEDURE — 99214 OFFICE O/P EST MOD 30 MIN: CPT | Performed by: INTERNAL MEDICINE

## 2021-05-11 PROCEDURE — 1036F TOBACCO NON-USER: CPT | Performed by: INTERNAL MEDICINE

## 2021-05-11 ASSESSMENT — ENCOUNTER SYMPTOMS
SORE THROAT: 0
SHORTNESS OF BREATH: 0
DIARRHEA: 0
COUGH: 0
CHEST TIGHTNESS: 0
WHEEZING: 0
ABDOMINAL PAIN: 0
VOMITING: 0
NAUSEA: 0
VOICE CHANGE: 0
EYE ITCHING: 0
RHINORRHEA: 0

## 2021-05-11 NOTE — PROGRESS NOTES
Subjective:     Papo Rodriguez is a 22 y.o. male who complains today of:     Chief Complaint   Patient presents with    Sleep Apnea     2 month f/u       HPI  Patient is using BiPAP with  I 14 and E 10  centimeters of H2O with heated humidity. He is using BiPAP for about  4   hours every night. He is using BiPAP with full face mask  He said  sleep is restful with the BiPAP use. No snoring with BiPAP use  No early morning headache. No complaint of daytime sleepiness or tiredness with BiPAP use  He is still  taking naps. He denies difficulty falling asleep or staying asleep. I reviewed compliance report with patient regarding BiPAP therapy. He is using  BiPAP for 22 days out of 30 days. 3 hours and 36 min , average AHI 2.7 with CPAP use. Allergies:  Patient has no known allergies.   Past Medical History:   Diagnosis Date    ADHD (attention deficit hyperactivity disorder) 2012    Allergic rhinitis 2012    Hypertension     Obesity (BMI 30-39.9) 2012    Sleep apnea      Past Surgical History:   Procedure Laterality Date    TONSILLECTOMY       Family History   Problem Relation Age of Onset    No Known Problems Mother     No Known Problems Father      Social History     Socioeconomic History    Marital status: Single     Spouse name: Not on file    Number of children: Not on file    Years of education: Not on file    Highest education level: Not on file   Occupational History    Not on file   Social Needs    Financial resource strain: Not on file    Food insecurity     Worry: Not on file     Inability: Not on file    Transportation needs     Medical: Not on file     Non-medical: Not on file   Tobacco Use    Smoking status: Former Smoker     Packs/day: 0.50     Years: 7.00     Pack years: 3.50     Types: Cigarettes     Start date: 2013     Quit date: 2018     Years since quittin.9    Smokeless tobacco: Never Used   Substance and Sexual Activity    Alcohol use: Yes     Comment: occ    Drug use: No    Sexual activity: Not on file   Lifestyle    Physical activity     Days per week: Not on file     Minutes per session: Not on file    Stress: Not on file   Relationships    Social connections     Talks on phone: Not on file     Gets together: Not on file     Attends Restorationism service: Not on file     Active member of club or organization: Not on file     Attends meetings of clubs or organizations: Not on file     Relationship status: Not on file    Intimate partner violence     Fear of current or ex partner: Not on file     Emotionally abused: Not on file     Physically abused: Not on file     Forced sexual activity: Not on file   Other Topics Concern    Not on file   Social History Narrative    Not on file         Review of Systems   Constitutional: Negative for chills, diaphoresis, fatigue and fever. HENT: Negative for congestion, mouth sores, nosebleeds, postnasal drip, rhinorrhea, sneezing, sore throat and voice change. Eyes: Negative for itching and visual disturbance. Respiratory: Negative for cough, chest tightness, shortness of breath and wheezing. Cardiovascular: Negative. Negative for chest pain, palpitations and leg swelling. Gastrointestinal: Negative for abdominal pain, diarrhea, nausea and vomiting. Genitourinary: Negative for difficulty urinating and hematuria. Musculoskeletal: Negative for arthralgias, joint swelling and myalgias. Skin: Negative for rash. Allergic/Immunologic: Negative for environmental allergies. Neurological: Negative for dizziness, tremors, weakness and headaches. Psychiatric/Behavioral: Positive for sleep disturbance. Negative for behavioral problems.          :     Vitals:    05/11/21 1434   BP: 137/65   Pulse: 135   Temp: 98.5 °F (36.9 °C)   SpO2: 98%   Weight: (!) 321 lb (145.6 kg)   Height: 5' 5\" (1.651 m)     Wt Readings from Last 3 Encounters:   05/11/21 (!) 321 lb (145.6 kg)   03/11/21 (!) 324 lb (147 kg)   01/05/21 (!) 326 lb (147.9 kg)         Physical Exam  Constitutional:       Appearance: He is well-developed. He is obese. HENT:      Head: Normocephalic and atraumatic. Nose: Nose normal.   Eyes:      Conjunctiva/sclera: Conjunctivae normal.      Pupils: Pupils are equal, round, and reactive to light. Neck:      Thyroid: No thyromegaly. Vascular: No JVD. Trachea: No tracheal deviation. Cardiovascular:      Rate and Rhythm: Normal rate and regular rhythm. Heart sounds: No murmur. No friction rub. No gallop. Pulmonary:      Effort: Pulmonary effort is normal. No respiratory distress. Breath sounds: Normal breath sounds. No wheezing or rales. Chest:      Chest wall: No tenderness. Abdominal:      General: There is no distension. Musculoskeletal: Normal range of motion. Lymphadenopathy:      Cervical: No cervical adenopathy. Skin:     General: Skin is warm and dry. Findings: No rash. Neurological:      Mental Status: He is alert and oriented to person, place, and time. Cranial Nerves: No cranial nerve deficit. Psychiatric:         Behavior: Behavior normal.         Current Outpatient Medications   Medication Sig Dispense Refill    amLODIPine (NORVASC) 10 MG tablet Take 1 tablet by mouth daily 90 tablet 1    hydroCHLOROthiazide (HYDRODIURIL) 25 MG tablet Take 1 tablet by mouth every morning 90 tablet 1    losartan (COZAAR) 100 MG tablet Take 1 tablet by mouth daily 90 tablet 1    ARIPiprazole (ABILIFY) 10 MG tablet TAKE 1 TABLET BY MOUTH EVERY DAY  2    benztropine (COGENTIN) 1 MG tablet Take 1 mg by mouth 2 times daily  0    divalproex (DEPAKOTE ER) 500 MG extended release tablet Take 1 tablet by mouth nightly 30 tablet 3    Blood Pressure Monitoring (ADULT BLOOD PRESSURE CUFF LG) KIT Check blood pressure every other day 1 kit 0     No current facility-administered medications for this visit.         Assessment/Plan:     1. CHRISTOPHER (obstructive sleep apnea)  He  is using BiPAP with  I 14 and E 10  centimeters of H2O with heated humidity. He is using BiPAP for about  4   hours every night. He is using BiPAP with full face mask . He said  sleep is restful with the BiPAP use. No snoring with BiPAP use. I reviewed compliance report with patient regarding BiPAP therapy. He is using  BiPAP for 22 days out of 30 days. 3 hours and 36 min , average AHI 2.7 with CPAP use. Counseling: BiPAP uses, He advised to use BiPAP at least 5-6 hours every night. Sleep hygiene:Avoid supine sleep, sleep on  sides. Avoid  sleep deprivation. Explained sleep hygiene. Advice to avoid Alcohol and sedative    Time spend over 30 min. Face to face. with greater than 50 % time with counseling regarding CPAP therapy. 2. Morbid obesity ([address]  He is advised try to lose weight. obesity related risk explained to the patient ,  Current weight:  (!) 321 lb (145.6 kg) Lbs. BMI:  Body mass index is 53.42 kg/m². Suggested weight control approaches, including dietary changes , exercise, behavioral modification. Return in about 3 months (around 8/11/2021) for ry.       Elisabeth Monzon MD patient

## 2021-10-12 RX ORDER — LOSARTAN POTASSIUM 100 MG/1
TABLET ORAL
Qty: 90 TABLET | Refills: 1 | Status: SHIPPED | OUTPATIENT
Start: 2021-10-12

## 2021-10-20 ENCOUNTER — HOSPITAL ENCOUNTER (OUTPATIENT)
Dept: NON INVASIVE DIAGNOSTICS | Age: 26
Discharge: HOME | End: 2021-10-20
Payer: MEDICARE

## 2021-10-20 PROCEDURE — 93005 ELECTROCARDIOGRAM TRACING: CPT | Performed by: PHYSICIAN ASSISTANT

## 2021-10-22 LAB
EKG ATRIAL RATE: 100 BPM
EKG P AXIS: 28 DEGREES
EKG P-R INTERVAL: 150 MS
EKG Q-T INTERVAL: 362 MS
EKG QRS DURATION: 90 MS
EKG QTC CALCULATION (BAZETT): 466 MS
EKG R AXIS: 73 DEGREES
EKG T AXIS: -21 DEGREES
EKG VENTRICULAR RATE: 100 BPM

## 2021-10-22 PROCEDURE — 93010 ELECTROCARDIOGRAM REPORT: CPT | Performed by: INTERNAL MEDICINE

## 2021-10-26 RX ORDER — HYDROCHLOROTHIAZIDE 25 MG/1
25 TABLET ORAL EVERY MORNING
Qty: 90 TABLET | Refills: 1 | Status: SHIPPED | OUTPATIENT
Start: 2021-10-26

## 2021-12-09 DIAGNOSIS — E66.9 OBESITY (BMI 30-39.9): ICD-10-CM

## 2021-12-09 DIAGNOSIS — I10 ESSENTIAL HYPERTENSION: ICD-10-CM

## 2021-12-09 RX ORDER — AMLODIPINE BESYLATE 10 MG/1
TABLET ORAL
Qty: 90 TABLET | Refills: 1 | Status: SHIPPED | OUTPATIENT
Start: 2021-12-09

## 2022-01-25 ENCOUNTER — HOSPITAL ENCOUNTER (EMERGENCY)
Age: 27
Discharge: HOME | End: 2022-01-25
Attending: STUDENT IN AN ORGANIZED HEALTH CARE EDUCATION/TRAINING PROGRAM
Payer: MEDICARE

## 2022-01-25 VITALS
HEIGHT: 67 IN | DIASTOLIC BLOOD PRESSURE: 87 MMHG | TEMPERATURE: 98.3 F | RESPIRATION RATE: 16 BRPM | HEART RATE: 89 BPM | BODY MASS INDEX: 44.26 KG/M2 | WEIGHT: 282 LBS | SYSTOLIC BLOOD PRESSURE: 176 MMHG | OXYGEN SATURATION: 98 %

## 2022-01-25 DIAGNOSIS — T50.905A ADVERSE EFFECT OF DRUG, INITIAL ENCOUNTER: Primary | ICD-10-CM

## 2022-01-25 DIAGNOSIS — M62.82 NON-TRAUMATIC RHABDOMYOLYSIS: ICD-10-CM

## 2022-01-25 LAB
ACETAMINOPHEN LEVEL: <5 UG/ML (ref 10–30)
ALBUMIN SERPL-MCNC: 5 G/DL (ref 3.5–4.6)
ALP BLD-CCNC: 86 U/L (ref 35–104)
ALT SERPL-CCNC: 45 U/L (ref 0–41)
AMPHETAMINE SCREEN, URINE: NORMAL
ANION GAP SERPL CALCULATED.3IONS-SCNC: 15 MEQ/L (ref 9–15)
AST SERPL-CCNC: 44 U/L (ref 0–40)
BACTERIA: NEGATIVE /HPF
BARBITURATE SCREEN URINE: NORMAL
BASOPHILS ABSOLUTE: 0 K/UL (ref 0–0.2)
BASOPHILS RELATIVE PERCENT: 0.7 %
BENZODIAZEPINE SCREEN, URINE: NORMAL
BILIRUB SERPL-MCNC: 1.2 MG/DL (ref 0.2–0.7)
BILIRUBIN URINE: NEGATIVE
BLOOD, URINE: NEGATIVE
BUN BLDV-MCNC: 12 MG/DL (ref 6–20)
CALCIUM SERPL-MCNC: 10.1 MG/DL (ref 8.5–9.9)
CANNABINOID SCREEN URINE: NORMAL
CHLORIDE BLD-SCNC: 97 MEQ/L (ref 95–107)
CK MB: 20.1 NG/ML (ref 0–6.7)
CLARITY: CLEAR
CO2: 28 MEQ/L (ref 20–31)
COCAINE METABOLITE SCREEN URINE: NORMAL
COLOR: YELLOW
CREAT SERPL-MCNC: 0.98 MG/DL (ref 0.7–1.2)
CREATINE KINASE-MB INDEX: 1.5 % (ref 0–3.5)
EOSINOPHILS ABSOLUTE: 0.1 K/UL (ref 0–0.7)
EOSINOPHILS RELATIVE PERCENT: 2.5 %
EPITHELIAL CELLS, UA: NORMAL /HPF (ref 0–5)
ETHANOL PERCENT: NORMAL G/DL
ETHANOL PERCENT: NORMAL G/DL
ETHANOL: <10 MG/DL (ref 0–0.08)
ETHANOL: <10 MG/DL (ref 0–0.08)
GFR AFRICAN AMERICAN: >60
GFR NON-AFRICAN AMERICAN: >60
GLOBULIN: 3.3 G/DL (ref 2.3–3.5)
GLUCOSE BLD-MCNC: 103 MG/DL (ref 70–99)
GLUCOSE URINE: NEGATIVE MG/DL
HCT VFR BLD CALC: 46.4 % (ref 42–52)
HEMOGLOBIN: 15.9 G/DL (ref 14–18)
HYALINE CASTS: NORMAL /HPF (ref 0–5)
KETONES, URINE: ABNORMAL MG/DL
LACTIC ACID: 0.8 MMOL/L (ref 0.5–2.2)
LACTIC ACID: 3.3 MMOL/L (ref 0.5–2.2)
LEUKOCYTE ESTERASE, URINE: NEGATIVE
LYMPHOCYTES ABSOLUTE: 2.2 K/UL (ref 1–4.8)
LYMPHOCYTES RELATIVE PERCENT: 43.4 %
Lab: NORMAL
MAGNESIUM: 2 MG/DL (ref 1.7–2.4)
MCH RBC QN AUTO: 28.9 PG (ref 27–31.3)
MCHC RBC AUTO-ENTMCNC: 34.2 % (ref 33–37)
MCV RBC AUTO: 84.6 FL (ref 80–100)
METHADONE SCREEN, URINE: NORMAL
MONOCYTES ABSOLUTE: 0.6 K/UL (ref 0.2–0.8)
MONOCYTES RELATIVE PERCENT: 11.4 %
NEUTROPHILS ABSOLUTE: 2.1 K/UL (ref 1.4–6.5)
NEUTROPHILS RELATIVE PERCENT: 42 %
NITRITE, URINE: NEGATIVE
OPIATE SCREEN URINE: NORMAL
OXYCODONE URINE: NORMAL
PDW BLD-RTO: 13.6 % (ref 11.5–14.5)
PH UA: 6 (ref 5–9)
PHENCYCLIDINE SCREEN URINE: NORMAL
PLATELET # BLD: 272 K/UL (ref 130–400)
POTASSIUM SERPL-SCNC: 3.6 MEQ/L (ref 3.4–4.9)
PROPOXYPHENE SCREEN: NORMAL
PROTEIN UA: 30 MG/DL
RBC # BLD: 5.49 M/UL (ref 4.7–6.1)
RBC UA: NORMAL /HPF (ref 0–5)
SALICYLATE, SERUM: <0.3 MG/DL (ref 15–30)
SARS-COV-2, NAAT: NOT DETECTED
SODIUM BLD-SCNC: 140 MEQ/L (ref 135–144)
SPECIFIC GRAVITY UA: 1.03 (ref 1–1.03)
TOTAL CK: 1370 U/L (ref 0–190)
TOTAL PROTEIN: 8.3 G/DL (ref 6.3–8)
TSH SERPL DL<=0.05 MIU/L-ACNC: 1.12 UIU/ML (ref 0.44–3.86)
URINE REFLEX TO CULTURE: ABNORMAL
UROBILINOGEN, URINE: 1 E.U./DL
VALPROIC ACID LEVEL: 3.9 UG/ML (ref 50–100)
WBC # BLD: 5.1 K/UL (ref 4.8–10.8)
WBC UA: NORMAL /HPF (ref 0–5)

## 2022-01-25 PROCEDURE — 85025 COMPLETE CBC W/AUTO DIFF WBC: CPT

## 2022-01-25 PROCEDURE — 80164 ASSAY DIPROPYLACETIC ACD TOT: CPT

## 2022-01-25 PROCEDURE — 80179 DRUG ASSAY SALICYLATE: CPT

## 2022-01-25 PROCEDURE — 96374 THER/PROPH/DIAG INJ IV PUSH: CPT

## 2022-01-25 PROCEDURE — 83735 ASSAY OF MAGNESIUM: CPT

## 2022-01-25 PROCEDURE — 6370000000 HC RX 637 (ALT 250 FOR IP): Performed by: STUDENT IN AN ORGANIZED HEALTH CARE EDUCATION/TRAINING PROGRAM

## 2022-01-25 PROCEDURE — 83605 ASSAY OF LACTIC ACID: CPT

## 2022-01-25 PROCEDURE — 93005 ELECTROCARDIOGRAM TRACING: CPT | Performed by: STUDENT IN AN ORGANIZED HEALTH CARE EDUCATION/TRAINING PROGRAM

## 2022-01-25 PROCEDURE — 81001 URINALYSIS AUTO W/SCOPE: CPT

## 2022-01-25 PROCEDURE — 84443 ASSAY THYROID STIM HORMONE: CPT

## 2022-01-25 PROCEDURE — 80053 COMPREHEN METABOLIC PANEL: CPT

## 2022-01-25 PROCEDURE — 99284 EMERGENCY DEPT VISIT MOD MDM: CPT

## 2022-01-25 PROCEDURE — 6360000002 HC RX W HCPCS: Performed by: STUDENT IN AN ORGANIZED HEALTH CARE EDUCATION/TRAINING PROGRAM

## 2022-01-25 PROCEDURE — 82077 ASSAY SPEC XCP UR&BREATH IA: CPT

## 2022-01-25 PROCEDURE — 80307 DRUG TEST PRSMV CHEM ANLYZR: CPT

## 2022-01-25 PROCEDURE — 2580000003 HC RX 258: Performed by: STUDENT IN AN ORGANIZED HEALTH CARE EDUCATION/TRAINING PROGRAM

## 2022-01-25 PROCEDURE — 36415 COLL VENOUS BLD VENIPUNCTURE: CPT

## 2022-01-25 PROCEDURE — 80143 DRUG ASSAY ACETAMINOPHEN: CPT

## 2022-01-25 PROCEDURE — 82553 CREATINE MB FRACTION: CPT

## 2022-01-25 PROCEDURE — 82550 ASSAY OF CK (CPK): CPT

## 2022-01-25 PROCEDURE — 87635 SARS-COV-2 COVID-19 AMP PRB: CPT

## 2022-01-25 RX ORDER — LORAZEPAM 0.5 MG/1
0.5 TABLET ORAL 3 TIMES DAILY PRN
Qty: 6 TABLET | Refills: 0 | Status: SHIPPED | OUTPATIENT
Start: 2022-01-25 | End: 2022-01-27

## 2022-01-25 RX ORDER — IBUPROFEN 600 MG/1
600 TABLET ORAL ONCE
Status: COMPLETED | OUTPATIENT
Start: 2022-01-25 | End: 2022-01-25

## 2022-01-25 RX ORDER — LORAZEPAM 2 MG/ML
2 INJECTION INTRAMUSCULAR ONCE
Status: COMPLETED | OUTPATIENT
Start: 2022-01-25 | End: 2022-01-25

## 2022-01-25 RX ORDER — 0.9 % SODIUM CHLORIDE 0.9 %
1000 INTRAVENOUS SOLUTION INTRAVENOUS ONCE
Status: COMPLETED | OUTPATIENT
Start: 2022-01-25 | End: 2022-01-25

## 2022-01-25 RX ADMIN — IBUPROFEN 600 MG: 600 TABLET ORAL at 21:39

## 2022-01-25 RX ADMIN — SODIUM CHLORIDE 1000 ML: 9 INJECTION, SOLUTION INTRAVENOUS at 18:45

## 2022-01-25 RX ADMIN — SODIUM CHLORIDE 1000 ML: 9 INJECTION, SOLUTION INTRAVENOUS at 22:25

## 2022-01-25 RX ADMIN — LORAZEPAM 2 MG: 2 INJECTION INTRAMUSCULAR; INTRAVENOUS at 18:45

## 2022-01-25 ASSESSMENT — ENCOUNTER SYMPTOMS
VOMITING: 0
COUGH: 0
ABDOMINAL PAIN: 0
PHOTOPHOBIA: 0
WHEEZING: 0
SHORTNESS OF BREATH: 0
NAUSEA: 0

## 2022-01-25 ASSESSMENT — PAIN SCALES - GENERAL
PAINLEVEL_OUTOF10: 9
PAINLEVEL_OUTOF10: 9

## 2022-01-25 ASSESSMENT — PAIN DESCRIPTION - LOCATION: LOCATION: GENERALIZED

## 2022-01-25 ASSESSMENT — PAIN DESCRIPTION - DESCRIPTORS: DESCRIPTORS: ACHING;CRAMPING

## 2022-01-25 ASSESSMENT — PAIN DESCRIPTION - PAIN TYPE: TYPE: ACUTE PAIN

## 2022-01-25 ASSESSMENT — PAIN DESCRIPTION - FREQUENCY: FREQUENCY: CONTINUOUS

## 2022-01-25 NOTE — ED TRIAGE NOTES
Pt states he has uncontrolled movements from Abilify two months ago. Pt states he has been seen and treated and it has not been helping. Pt seen by  today and sent to er for pain. Pt is aox4 pwd.

## 2022-01-26 LAB
EKG ATRIAL RATE: 97 BPM
EKG P AXIS: 46 DEGREES
EKG P-R INTERVAL: 156 MS
EKG Q-T INTERVAL: 370 MS
EKG QRS DURATION: 92 MS
EKG QTC CALCULATION (BAZETT): 469 MS
EKG R AXIS: 70 DEGREES
EKG T AXIS: 19 DEGREES
EKG VENTRICULAR RATE: 97 BPM

## 2022-01-26 PROCEDURE — 93010 ELECTROCARDIOGRAM REPORT: CPT | Performed by: INTERNAL MEDICINE

## 2022-01-26 NOTE — ED NOTES
Spoke with Jeremias Ibrahim from lab regarding outstanding lab work for patient. Awaiting results.      Teri Bahena RN  01/25/22 1573

## 2022-01-30 ENCOUNTER — HOSPITAL ENCOUNTER (EMERGENCY)
Age: 27
Discharge: HOME | End: 2022-01-31
Payer: MEDICARE

## 2022-01-30 DIAGNOSIS — G24.02 DYSTONIC DRUG REACTION: Primary | ICD-10-CM

## 2022-01-30 DIAGNOSIS — M62.82 NON-TRAUMATIC RHABDOMYOLYSIS: ICD-10-CM

## 2022-01-30 LAB
ALBUMIN SERPL-MCNC: 5.1 G/DL (ref 3.5–4.6)
ALP BLD-CCNC: 90 U/L (ref 35–104)
ALT SERPL-CCNC: 35 U/L (ref 0–41)
ANION GAP SERPL CALCULATED.3IONS-SCNC: 15 MEQ/L (ref 9–15)
APTT: 31 SEC (ref 24.4–36.8)
AST SERPL-CCNC: 42 U/L (ref 0–40)
BASOPHILS ABSOLUTE: 0.1 K/UL (ref 0–0.2)
BASOPHILS RELATIVE PERCENT: 0.7 %
BILIRUB SERPL-MCNC: 1.5 MG/DL (ref 0.2–0.7)
BUN BLDV-MCNC: 14 MG/DL (ref 6–20)
CALCIUM SERPL-MCNC: 10.1 MG/DL (ref 8.5–9.9)
CHLORIDE BLD-SCNC: 97 MEQ/L (ref 95–107)
CK MB: 25.2 NG/ML (ref 0–6.7)
CO2: 26 MEQ/L (ref 20–31)
CREAT SERPL-MCNC: 1.18 MG/DL (ref 0.7–1.2)
CREATINE KINASE-MB INDEX: 1.8 % (ref 0–3.5)
EOSINOPHILS ABSOLUTE: 0.1 K/UL (ref 0–0.7)
EOSINOPHILS RELATIVE PERCENT: 0.9 %
GFR AFRICAN AMERICAN: >60
GFR NON-AFRICAN AMERICAN: >60
GLOBULIN: 3 G/DL (ref 2.3–3.5)
GLUCOSE BLD-MCNC: 144 MG/DL (ref 70–99)
HCT VFR BLD CALC: 45.4 % (ref 42–52)
HEMOGLOBIN: 16 G/DL (ref 14–18)
INR BLD: 1.1
LACTIC ACID: 4 MMOL/L (ref 0.5–2.2)
LYMPHOCYTES ABSOLUTE: 2.5 K/UL (ref 1–4.8)
LYMPHOCYTES RELATIVE PERCENT: 35.6 %
MAGNESIUM: 2 MG/DL (ref 1.7–2.4)
MCH RBC QN AUTO: 29.2 PG (ref 27–31.3)
MCHC RBC AUTO-ENTMCNC: 35.2 % (ref 33–37)
MCV RBC AUTO: 82.9 FL (ref 80–100)
MONOCYTES ABSOLUTE: 0.5 K/UL (ref 0.2–0.8)
MONOCYTES RELATIVE PERCENT: 7.5 %
NEUTROPHILS ABSOLUTE: 3.9 K/UL (ref 1.4–6.5)
NEUTROPHILS RELATIVE PERCENT: 55.3 %
PDW BLD-RTO: 13.5 % (ref 11.5–14.5)
PLATELET # BLD: 311 K/UL (ref 130–400)
POTASSIUM SERPL-SCNC: 3.5 MEQ/L (ref 3.4–4.9)
PROTHROMBIN TIME: 14 SEC (ref 12.3–14.9)
RBC # BLD: 5.47 M/UL (ref 4.7–6.1)
SODIUM BLD-SCNC: 138 MEQ/L (ref 135–144)
TOTAL CK: 1412 U/L (ref 0–190)
TOTAL PROTEIN: 8.1 G/DL (ref 6.3–8)
WBC # BLD: 7 K/UL (ref 4.8–10.8)

## 2022-01-30 PROCEDURE — 6360000002 HC RX W HCPCS: Performed by: PHYSICIAN ASSISTANT

## 2022-01-30 PROCEDURE — 81001 URINALYSIS AUTO W/SCOPE: CPT

## 2022-01-30 PROCEDURE — 36415 COLL VENOUS BLD VENIPUNCTURE: CPT

## 2022-01-30 PROCEDURE — 80053 COMPREHEN METABOLIC PANEL: CPT

## 2022-01-30 PROCEDURE — 83735 ASSAY OF MAGNESIUM: CPT

## 2022-01-30 PROCEDURE — 82553 CREATINE MB FRACTION: CPT

## 2022-01-30 PROCEDURE — 2580000003 HC RX 258: Performed by: PHYSICIAN ASSISTANT

## 2022-01-30 PROCEDURE — 82550 ASSAY OF CK (CPK): CPT

## 2022-01-30 PROCEDURE — 85730 THROMBOPLASTIN TIME PARTIAL: CPT

## 2022-01-30 PROCEDURE — 85025 COMPLETE CBC W/AUTO DIFF WBC: CPT

## 2022-01-30 PROCEDURE — 85610 PROTHROMBIN TIME: CPT

## 2022-01-30 PROCEDURE — 96374 THER/PROPH/DIAG INJ IV PUSH: CPT

## 2022-01-30 PROCEDURE — 96375 TX/PRO/DX INJ NEW DRUG ADDON: CPT

## 2022-01-30 PROCEDURE — 83605 ASSAY OF LACTIC ACID: CPT

## 2022-01-30 PROCEDURE — 99283 EMERGENCY DEPT VISIT LOW MDM: CPT

## 2022-01-30 RX ORDER — 0.9 % SODIUM CHLORIDE 0.9 %
2000 INTRAVENOUS SOLUTION INTRAVENOUS ONCE
Status: COMPLETED | OUTPATIENT
Start: 2022-01-30 | End: 2022-01-31

## 2022-01-30 RX ORDER — LORAZEPAM 2 MG/ML
1 INJECTION INTRAMUSCULAR ONCE
Status: COMPLETED | OUTPATIENT
Start: 2022-01-30 | End: 2022-01-30

## 2022-01-30 RX ORDER — KETOROLAC TROMETHAMINE 15 MG/ML
30 INJECTION, SOLUTION INTRAMUSCULAR; INTRAVENOUS ONCE
Status: COMPLETED | OUTPATIENT
Start: 2022-01-30 | End: 2022-01-30

## 2022-01-30 RX ORDER — DIPHENHYDRAMINE HYDROCHLORIDE 50 MG/ML
25 INJECTION INTRAMUSCULAR; INTRAVENOUS ONCE
Status: COMPLETED | OUTPATIENT
Start: 2022-01-30 | End: 2022-01-30

## 2022-01-30 RX ADMIN — SODIUM CHLORIDE 1000 ML: 9 INJECTION, SOLUTION INTRAVENOUS at 23:56

## 2022-01-30 RX ADMIN — SODIUM CHLORIDE 1000 ML: 9 INJECTION, SOLUTION INTRAVENOUS at 22:42

## 2022-01-30 RX ADMIN — SODIUM CHLORIDE 2000 ML: 9 INJECTION, SOLUTION INTRAVENOUS at 21:42

## 2022-01-30 RX ADMIN — LORAZEPAM 1 MG: 2 INJECTION INTRAMUSCULAR; INTRAVENOUS at 21:43

## 2022-01-30 RX ADMIN — KETOROLAC TROMETHAMINE 30 MG: 15 INJECTION, SOLUTION INTRAMUSCULAR; INTRAVENOUS at 21:42

## 2022-01-30 RX ADMIN — DIPHENHYDRAMINE HYDROCHLORIDE 25 MG: 50 INJECTION INTRAMUSCULAR; INTRAVENOUS at 21:58

## 2022-01-30 ASSESSMENT — ENCOUNTER SYMPTOMS
COLOR CHANGE: 0
ABDOMINAL PAIN: 0
TROUBLE SWALLOWING: 0
SHORTNESS OF BREATH: 0
ALLERGIC/IMMUNOLOGIC NEGATIVE: 1
APNEA: 0
EYE PAIN: 0

## 2022-01-30 ASSESSMENT — PAIN DESCRIPTION - PAIN TYPE: TYPE: CHRONIC PAIN

## 2022-01-30 ASSESSMENT — PAIN SCALES - GENERAL: PAINLEVEL_OUTOF10: 10

## 2022-01-30 ASSESSMENT — PAIN DESCRIPTION - LOCATION: LOCATION: GENERALIZED

## 2022-01-31 VITALS
HEART RATE: 145 BPM | BODY MASS INDEX: 45.32 KG/M2 | RESPIRATION RATE: 28 BRPM | TEMPERATURE: 98.1 F | WEIGHT: 282 LBS | HEIGHT: 66 IN | OXYGEN SATURATION: 94 % | DIASTOLIC BLOOD PRESSURE: 101 MMHG | SYSTOLIC BLOOD PRESSURE: 142 MMHG

## 2022-01-31 LAB
BACTERIA: NEGATIVE /HPF
BILIRUBIN URINE: NEGATIVE
BLOOD, URINE: NEGATIVE
CK MB: 17.2 NG/ML (ref 0–6.7)
CLARITY: ABNORMAL
COLOR: YELLOW
CREATINE KINASE-MB INDEX: 1.7 % (ref 0–3.5)
EPITHELIAL CELLS, UA: NORMAL /HPF (ref 0–5)
FINE CASTS, UA: NORMAL /LPF (ref 0–5)
GLUCOSE URINE: NEGATIVE MG/DL
KETONES, URINE: NEGATIVE MG/DL
LEUKOCYTE ESTERASE, URINE: NEGATIVE
MUCUS: PRESENT /LPF
NITRITE, URINE: NEGATIVE
PH UA: 5 (ref 5–9)
PROTEIN UA: 100 MG/DL
RBC UA: NORMAL /HPF (ref 0–5)
SPECIFIC GRAVITY UA: 1.04 (ref 1–1.03)
TOTAL CK: 1021 U/L (ref 0–190)
URINE REFLEX TO CULTURE: ABNORMAL
UROBILINOGEN, URINE: 0.2 E.U./DL
WBC UA: NORMAL /HPF (ref 0–5)

## 2022-01-31 PROCEDURE — 6370000000 HC RX 637 (ALT 250 FOR IP)

## 2022-01-31 PROCEDURE — 36415 COLL VENOUS BLD VENIPUNCTURE: CPT

## 2022-01-31 PROCEDURE — 82550 ASSAY OF CK (CPK): CPT

## 2022-01-31 PROCEDURE — 82553 CREATINE MB FRACTION: CPT

## 2022-01-31 RX ORDER — DIPHENHYDRAMINE HCL 25 MG
25 CAPSULE ORAL 2 TIMES DAILY
Qty: 60 CAPSULE | Refills: 2 | Status: SHIPPED | OUTPATIENT
Start: 2022-01-31 | End: 2022-02-10

## 2022-01-31 RX ORDER — AMLODIPINE BESYLATE 5 MG/1
10 TABLET ORAL ONCE
Status: COMPLETED | OUTPATIENT
Start: 2022-01-31 | End: 2022-01-31

## 2022-01-31 RX ADMIN — AMLODIPINE BESYLATE 10 MG: 5 TABLET ORAL at 03:17

## 2022-01-31 ASSESSMENT — PAIN SCALES - GENERAL: PAINLEVEL_OUTOF10: 0

## 2022-01-31 NOTE — ED NOTES
Pt appears slightly less anxious.   Still having contractions of juan miguel upper and lower extremeties     Dedrickliza Chappell, MAC  01/30/22 0403

## 2022-01-31 NOTE — ED TRIAGE NOTES
Patient presents to the er with complaints of body pain everywhere  States that he had an allergic reaction to Abilify approx 1 month ago   States that he was seen here for the same  Patients right arm is seemingly contracted due to spasm   Diaphoretic  Thrashing in triage seat  BP will not work due to patient thrashing in seat  Took motrin prior to arrival with no help

## 2022-01-31 NOTE — ED NOTES
Pt stable and ambulatory upon discharge. Discharge instructions provided. Follow-up care reviewed. Pt verbalized understanding.       Santa Orlando RN  01/31/22 [id number]

## 2022-01-31 NOTE — ED NOTES
Resting quietly. Eyes closed. resp even and unlabored.   Appears more relaxed after the melissa Quinn RN  01/30/22 [id number]

## 2022-01-31 NOTE — ED PROVIDER NOTES
[address] [facility] ED  EMERGENCY DEPARTMENT ENCOUNTER      Pt Name: Quinn Stafford  MRN: [id number]  [place] 1995  Date of evaluation: 1/30/2022  Provider: Gricel Alford Dr       Chief Complaint   Patient presents with    Muscle Pain         HISTORY OF PRESENT ILLNESS   (Location/Symptom, Timing/Onset, Context/Setting, Quality, Duration, Modifying Factors, Severity)  Note limiting factors. Quinn Stafford is a 32 y.o. male who presents to the emergency department with complaints of myalgias primarily through the bilateral upper extremity and bilateral lower extremities. Patient was seen in the emergency department for similar complaints 5 days ago and diagnosed with a mild rhabdomyolysis. Patient is accompanied by mom and mom states that the patient never fully improved and then earlier today he began to have worsening pain to the areas. Patient is extremely anxious and difficult to provide any further insight into the nature of his illness. Patient has some contractures of the wrist right greater than left    HPI    Nursing Notes were reviewed. REVIEW OF SYSTEMS    (2-9 systems for level 4, 10 or more for level 5)     Review of Systems   Constitutional: Negative for diaphoresis and fever. HENT: Negative for hearing loss and trouble swallowing. Eyes: Negative for pain. Respiratory: Negative for apnea and shortness of breath. Cardiovascular: Negative for chest pain. Gastrointestinal: Negative for abdominal pain. Endocrine: Negative. Genitourinary: Negative for hematuria. Musculoskeletal: Positive for joint swelling and myalgias. Negative for neck pain and neck stiffness. Skin: Negative for color change. Allergic/Immunologic: Negative. Neurological: Negative for dizziness and numbness. Hematological: Negative. Psychiatric/Behavioral: The patient is nervous/anxious. All other systems reviewed and are negative.       Except as noted above the remainder of the review of systems was reviewed and negative. PAST MEDICAL HISTORY     Past Medical History:   Diagnosis Date    ADHD (attention deficit hyperactivity disorder) 4/4/2012    Allergic rhinitis 4/4/2012    Hypertension     Obesity (BMI 30-39.9) 4/4/2012    Sleep apnea          SURGICAL HISTORY       Past Surgical History:   Procedure Laterality Date    TONSILLECTOMY           CURRENT MEDICATIONS       Previous Medications    AMLODIPINE (NORVASC) 10 MG TABLET    TAKE 1 TABLET BY MOUTH EVERY DAY    ARIPIPRAZOLE (ABILIFY) 10 MG TABLET    TAKE 1 TABLET BY MOUTH EVERY DAY    BENZTROPINE (COGENTIN) 1 MG TABLET    Take 1 mg by mouth 2 times daily    BLOOD PRESSURE MONITORING (ADULT BLOOD PRESSURE CUFF LG) KIT    Check blood pressure every other day    DIVALPROEX (DEPAKOTE ER) 500 MG EXTENDED RELEASE TABLET    Take 1 tablet by mouth nightly    HYDROCHLOROTHIAZIDE (HYDRODIURIL) 25 MG TABLET    TAKE 1 TABLET BY MOUTH EVERY MORNING    LOSARTAN (COZAAR) 100 MG TABLET    TAKE 1 TABLET BY MOUTH EVERY DAY       ALLERGIES     Patient has no known allergies.     FAMILY HISTORY       Family History   Problem Relation Age of Onset    No Known Problems Mother     No Known Problems Father           SOCIAL HISTORY       Social History     Socioeconomic History    Marital status: Single     Spouse name: None    Number of children: None    Years of education: None    Highest education level: None   Occupational History    None   Tobacco Use    Smoking status: Former Smoker     Packs/day: 0.50     Years: 7.00     Pack years: 3.50     Types: Cigarettes     Start date: 11/25/2013     Quit date: 6/1/2018     Years since quitting: 3.6    Smokeless tobacco: Never Used   Substance and Sexual Activity    Alcohol use: Yes     Comment: occ    Drug use: No    Sexual activity: None   Other Topics Concern    None   Social History Narrative    None     Social Determinants of Health     Financial Resource Strain:  Difficulty of Paying Living Expenses: Not on file   Food Insecurity:     Worried About Running Out of Food in the Last Year: Not on file    Ran Out of Food in the Last Year: Not on file   Transportation Needs:     Lack of Transportation (Medical): Not on file    Lack of Transportation (Non-Medical): Not on file   Physical Activity:     Days of Exercise per Week: Not on file    Minutes of Exercise per Session: Not on file   Stress:     Feeling of Stress : Not on file   Social Connections:     Frequency of Communication with Friends and Family: Not on file    Frequency of Social Gatherings with Friends and Family: Not on file    Attends Sikh Services: Not on file    Active Member of [address] [place] [address] [employer] or Organizations: Not on file    Attends Club or Organization Meetings: Not on file    Marital Status: Not on file   Intimate Partner Violence:     Fear of Current or Ex-Partner: Not on file    Emotionally Abused: Not on file    Physically Abused: Not on file    Sexually Abused: Not on file   Housing Stability:     Unable to Pay for Housing in the Last Year: Not on file    Number of [place] in the Last Year: Not on file    Unstable Housing in the Last Year: Not on file       SCREENINGS                        PHYSICAL EXAM    (up to 7 for level 4, 8 or more for level 5)     ED Triage Vitals [01/30/22 2112]   BP Temp Temp Source Pulse Resp SpO2 Height Weight   -- 98.1 °F (36.7 °C) Oral 145 28 94 % 5' 6\" (1.676 m) 282 lb (127.9 kg)       Physical Exam  Vitals and nursing note reviewed. Constitutional:       General: He is not in acute distress. Appearance: He is well-developed. He is not diaphoretic. HENT:      Head: Normocephalic and atraumatic. Mouth/Throat:      Pharynx: No oropharyngeal exudate. Eyes:      General: No scleral icterus. Conjunctiva/sclera: Conjunctivae normal.      Pupils: Pupils are equal, round, and reactive to light. Neck:      Trachea: No tracheal deviation. Cardiovascular:      Rate and Rhythm: Tachycardia present. Heart sounds: Normal heart sounds. Pulmonary:      Effort: Pulmonary effort is normal. No respiratory distress. Breath sounds: Normal breath sounds. Abdominal:      General: Bowel sounds are normal. There is no distension. Palpations: Abdomen is soft. Musculoskeletal:         General: Normal range of motion. Cervical back: Normal range of motion and neck supple. Skin:     General: Skin is warm and dry. Findings: No erythema or rash. Neurological:      Mental Status: He is alert and oriented to person, place, and time. Cranial Nerves: No cranial nerve deficit. Motor: No abnormal muscle tone. Psychiatric:         Mood and Affect: Mood is anxious. Behavior: Behavior normal.         Thought Content: Thought content normal.         Judgment: Judgment normal.         DIAGNOSTIC RESULTS     EKG: All EKG's are interpreted by the Emergency Department Physician who either signs or Co-signs this chart in the absence of a cardiologist.    NA    RADIOLOGY:   Non-plain film images such as CT, Ultrasound and MRI are read by the radiologist. Plain radiographic images are visualized and preliminarily interpreted by the emergency physician with the below findings:    NA    Interpretation per the Radiologist below, if available at the time of this note:    No orders to display         ED BEDSIDE ULTRASOUND:   Performed by ED Physician - none    LABS:  Labs Reviewed   COMPREHENSIVE METABOLIC PANEL - Abnormal; Notable for the following components:       Result Value    Glucose 144 (*)     Calcium 10.1 (*)     Total Protein 8.1 (*)     Albumin 5.1 (*)     Total Bilirubin 1.5 (*)     AST 42 (*)     All other components within normal limits   LACTIC ACID, PLASMA - Abnormal; Notable for the following components:    Lactic Acid 4.0 (*)     All other components within normal limits    Narrative:     CALL  Adam  [facility] tel. [phone],  Freda Young results called to and read back by [facility], 01/30/2022  22:12, by [facility]   CK - Abnormal; Notable for the following components: Total CK 1,412 (*)     All other components within normal limits   URINE RT REFLEX TO CULTURE - Abnormal; Notable for the following components:    Clarity, UA TURBID (*)     Protein,  (*)     All other components within normal limits   CKMB & RELATIVE PERCENT - Abnormal; Notable for the following components:    CK-MB 25.2 (*)     All other components within normal limits   CK - Abnormal; Notable for the following components: Total CK 1,021 (*)     All other components within normal limits   CKMB & RELATIVE PERCENT - Abnormal; Notable for the following components:    CK-MB 17.2 (*)     All other components within normal limits   CBC WITH AUTO DIFFERENTIAL   MAGNESIUM   PROTIME-INR   APTT   MICROSCOPIC URINALYSIS       All other labs were within normal range or not returned as of this dictation. EMERGENCY DEPARTMENT COURSE and DIFFERENTIAL DIAGNOSIS/MDM:   Vitals:    Vitals:    01/30/22 2231 01/31/22 0002 01/31/22 0313 01/31/22 0317   BP: (!) 167/113 (!) 151/85 (!) 142/101 (!) 142/101   Pulse:       Resp:       Temp:       TempSrc:       SpO2:  100% 94%    Weight:       Height:           Patient received on signout at 0100 pending disposition. Patient reported presents today for myalgias to bilateral upper and lower extremities, seen 5 days ago for rhabdomyolysis states without full improvement. Patient takes Cogentin, consideration for dystonic reaction, patient is given Benadryl and immediately improves symptoms. Likely this is a dystonic reaction causing his symptoms. Lactic is 4, initial CK is 1370. Patient is given 4 IV NS boluses in the ED. He is noted to be persistently hypertensive, he does have hypertension and takes Norvasc, he is given dose of his home Norvasc.   He remains improved and his symptoms and without new complaints throughout remaining ED stay. Repeat CK is 1021 so trending down. He is stable for discharge at this time. Patient and visitors at bedside are counseled on dystonic reactions, instruct patient to premedicate with 25 mg p.o. Benadryl prior to taking his Cogentin, proximately 20 minutes prior. He is also to follow-up with his psychiatrist for this issue. He demonstrates understanding of plan and is stable for discharge. MDM      REASSESSMENT          CRITICAL CARE TIME   Total Critical Care time was 0 minutes, excluding separately reportable procedures. There was a high probability of clinically significant/life threatening deterioration in the patient's condition which required my urgent intervention. 0    CONSULTS:  None    PROCEDURES:  Unless otherwise noted below, none     Procedures        FINAL IMPRESSION      1. Dystonic drug reaction    2. Non-traumatic rhabdomyolysis          DISPOSITION/PLAN   DISPOSITION Decision To Discharge 01/31/2022 04:01:10 AM      PATIENT REFERRED TO:  Iza Hess MD  [id number]  [phone]            DISCHARGE MEDICATIONS:  New Prescriptions    DIPHENHYDRAMINE (BENADRYL ALLERGY) 25 MG CAPSULE    Take 1 capsule by mouth 2 times daily for 10 days     Controlled Substances Monitoring:     No flowsheet data found.     (Please note that portions of this note were completed with a voice recognition program.  Efforts were made to edit the dictations but occasionally words are mis-transcribed.)    ZABRINA Fisher (electronically signed)  Attending Emergency Physician            Andria Fisher  01/31/22 8491

## 2022-01-31 NOTE — ED NOTES
4th liter of NS hung. Pt updated on repeat labs after final liter of fluids.       Isabel Solano RN  01/31/22 8661

## 2022-02-15 ENCOUNTER — APPOINTMENT (OUTPATIENT)
Dept: GENERAL RADIOLOGY | Age: 27
DRG: 092 | End: 2022-02-15
Payer: MEDICARE

## 2022-02-15 ENCOUNTER — HOSPITAL ENCOUNTER (INPATIENT)
Age: 27
LOS: 5 days | Discharge: HOME | DRG: 092 | End: 2022-02-21
Attending: INTERNAL MEDICINE | Admitting: INTERNAL MEDICINE
Payer: MEDICARE

## 2022-02-15 DIAGNOSIS — G24.9 DYSTONIA: Primary | ICD-10-CM

## 2022-02-15 LAB
ALBUMIN SERPL-MCNC: 5.2 G/DL (ref 3.5–4.6)
ALP BLD-CCNC: 91 U/L (ref 35–104)
ALT SERPL-CCNC: 41 U/L (ref 0–41)
ANION GAP SERPL CALCULATED.3IONS-SCNC: 15 MEQ/L (ref 9–15)
AST SERPL-CCNC: 38 U/L (ref 0–40)
BILIRUB SERPL-MCNC: 1.4 MG/DL (ref 0.2–0.7)
BUN BLDV-MCNC: 12 MG/DL (ref 6–20)
CALCIUM SERPL-MCNC: 10.7 MG/DL (ref 8.5–9.9)
CHLORIDE BLD-SCNC: 97 MEQ/L (ref 95–107)
CK MB: 19.3 NG/ML (ref 0–6.7)
CO2: 27 MEQ/L (ref 20–31)
CREAT SERPL-MCNC: 1.08 MG/DL (ref 0.7–1.2)
CREATINE KINASE-MB INDEX: 1.9 % (ref 0–3.5)
ETHANOL PERCENT: NORMAL G/DL
ETHANOL: <10 MG/DL (ref 0–0.08)
GFR AFRICAN AMERICAN: >60
GFR NON-AFRICAN AMERICAN: >60
GLOBULIN: 3.3 G/DL (ref 2.3–3.5)
GLUCOSE BLD-MCNC: 144 MG/DL (ref 70–99)
GLUCOSE BLD-MCNC: 148 MG/DL (ref 70–99)
LACTIC ACID: 3.6 MMOL/L (ref 0.5–2.2)
MAGNESIUM: 2.2 MG/DL (ref 1.7–2.4)
PERFORMED ON: ABNORMAL
POTASSIUM SERPL-SCNC: 4 MEQ/L (ref 3.4–4.9)
PRO-BNP: 54 PG/ML
SODIUM BLD-SCNC: 139 MEQ/L (ref 135–144)
TOTAL CK: 1024 U/L (ref 0–190)
TOTAL PROTEIN: 8.5 G/DL (ref 6.3–8)
TROPONIN: <0.01 NG/ML (ref 0–0.01)

## 2022-02-15 PROCEDURE — 71045 X-RAY EXAM CHEST 1 VIEW: CPT

## 2022-02-15 PROCEDURE — 80307 DRUG TEST PRSMV CHEM ANLYZR: CPT

## 2022-02-15 PROCEDURE — 2580000003 HC RX 258: Performed by: STUDENT IN AN ORGANIZED HEALTH CARE EDUCATION/TRAINING PROGRAM

## 2022-02-15 PROCEDURE — 84484 ASSAY OF TROPONIN QUANT: CPT

## 2022-02-15 PROCEDURE — 6360000002 HC RX W HCPCS: Performed by: STUDENT IN AN ORGANIZED HEALTH CARE EDUCATION/TRAINING PROGRAM

## 2022-02-15 PROCEDURE — 99284 EMERGENCY DEPT VISIT MOD MDM: CPT

## 2022-02-15 PROCEDURE — 80053 COMPREHEN METABOLIC PANEL: CPT

## 2022-02-15 PROCEDURE — 96372 THER/PROPH/DIAG INJ SC/IM: CPT

## 2022-02-15 PROCEDURE — 85025 COMPLETE CBC W/AUTO DIFF WBC: CPT

## 2022-02-15 PROCEDURE — 83735 ASSAY OF MAGNESIUM: CPT

## 2022-02-15 PROCEDURE — 82077 ASSAY SPEC XCP UR&BREATH IA: CPT

## 2022-02-15 PROCEDURE — 83605 ASSAY OF LACTIC ACID: CPT

## 2022-02-15 PROCEDURE — 36415 COLL VENOUS BLD VENIPUNCTURE: CPT

## 2022-02-15 PROCEDURE — 96375 TX/PRO/DX INJ NEW DRUG ADDON: CPT

## 2022-02-15 PROCEDURE — 81001 URINALYSIS AUTO W/SCOPE: CPT

## 2022-02-15 PROCEDURE — 96361 HYDRATE IV INFUSION ADD-ON: CPT

## 2022-02-15 PROCEDURE — 82553 CREATINE MB FRACTION: CPT

## 2022-02-15 PROCEDURE — 83880 ASSAY OF NATRIURETIC PEPTIDE: CPT

## 2022-02-15 PROCEDURE — 80183 DRUG SCRN QUANT OXCARBAZEPIN: CPT

## 2022-02-15 PROCEDURE — 82550 ASSAY OF CK (CPK): CPT

## 2022-02-15 PROCEDURE — 96374 THER/PROPH/DIAG INJ IV PUSH: CPT

## 2022-02-15 PROCEDURE — 93005 ELECTROCARDIOGRAM TRACING: CPT | Performed by: STUDENT IN AN ORGANIZED HEALTH CARE EDUCATION/TRAINING PROGRAM

## 2022-02-15 PROCEDURE — 96376 TX/PRO/DX INJ SAME DRUG ADON: CPT

## 2022-02-15 RX ORDER — DIPHENHYDRAMINE HYDROCHLORIDE 50 MG/ML
25 INJECTION INTRAMUSCULAR; INTRAVENOUS ONCE
Status: COMPLETED | OUTPATIENT
Start: 2022-02-15 | End: 2022-02-15

## 2022-02-15 RX ORDER — MIDAZOLAM HYDROCHLORIDE 2 MG/2ML
2 INJECTION, SOLUTION INTRAMUSCULAR; INTRAVENOUS ONCE
Status: COMPLETED | OUTPATIENT
Start: 2022-02-15 | End: 2022-02-15

## 2022-02-15 RX ORDER — DIPHENHYDRAMINE HYDROCHLORIDE 50 MG/ML
25 INJECTION INTRAMUSCULAR; INTRAVENOUS ONCE
Status: DISCONTINUED | OUTPATIENT
Start: 2022-02-15 | End: 2022-02-15

## 2022-02-15 RX ORDER — 0.9 % SODIUM CHLORIDE 0.9 %
1000 INTRAVENOUS SOLUTION INTRAVENOUS ONCE
Status: COMPLETED | OUTPATIENT
Start: 2022-02-15 | End: 2022-02-16

## 2022-02-15 RX ORDER — MIDAZOLAM HYDROCHLORIDE 2 MG/2ML
4 INJECTION, SOLUTION INTRAMUSCULAR; INTRAVENOUS ONCE
Status: DISCONTINUED | OUTPATIENT
Start: 2022-02-15 | End: 2022-02-15

## 2022-02-15 RX ORDER — MIDAZOLAM HYDROCHLORIDE 2 MG/2ML
2 INJECTION, SOLUTION INTRAMUSCULAR; INTRAVENOUS ONCE
Status: DISCONTINUED | OUTPATIENT
Start: 2022-02-15 | End: 2022-02-15

## 2022-02-15 RX ADMIN — MIDAZOLAM HYDROCHLORIDE 2 MG: 1 INJECTION, SOLUTION INTRAMUSCULAR; INTRAVENOUS at 22:36

## 2022-02-15 RX ADMIN — MIDAZOLAM HYDROCHLORIDE 2 MG: 1 INJECTION, SOLUTION INTRAMUSCULAR; INTRAVENOUS at 22:37

## 2022-02-15 RX ADMIN — SODIUM CHLORIDE 1000 ML: 9 INJECTION, SOLUTION INTRAVENOUS at 22:44

## 2022-02-15 RX ADMIN — DIPHENHYDRAMINE HYDROCHLORIDE 25 MG: 50 INJECTION, SOLUTION INTRAMUSCULAR; INTRAVENOUS at 22:36

## 2022-02-15 NOTE — Clinical Note
Patient Class: Inpatient [101]   REQUIRED: Diagnosis: Dystonia [[id number]]   Estimated Length of Stay: Estimated stay of more than 2 midnights   Admitting Provider: Margarita Lamas [[id number]]   Telemetry/Cardiac Monitoring Required?: Yes

## 2022-02-16 PROBLEM — G24.9 DYSTONIA: Status: ACTIVE | Noted: 2022-02-16

## 2022-02-16 LAB
ALBUMIN SERPL-MCNC: 3.9 G/DL (ref 3.5–4.6)
ALP BLD-CCNC: 71 U/L (ref 35–104)
ALT SERPL-CCNC: 33 U/L (ref 0–41)
AMPHETAMINE SCREEN, URINE: ABNORMAL
ANION GAP SERPL CALCULATED.3IONS-SCNC: 11 MEQ/L (ref 9–15)
AST SERPL-CCNC: 33 U/L (ref 0–40)
ATYPICAL LYMPHOCYTE RELATIVE PERCENT: 5 %
BACTERIA: NEGATIVE /HPF
BARBITURATE SCREEN URINE: ABNORMAL
BASOPHILS ABSOLUTE: 0.1 K/UL (ref 0–0.2)
BASOPHILS RELATIVE PERCENT: 1 %
BENZODIAZEPINE SCREEN, URINE: POSITIVE
BILIRUB SERPL-MCNC: 0.9 MG/DL (ref 0.2–0.7)
BILIRUBIN URINE: ABNORMAL
BLOOD, URINE: NEGATIVE
BUN BLDV-MCNC: 13 MG/DL (ref 6–20)
CALCIUM SERPL-MCNC: 8.6 MG/DL (ref 8.5–9.9)
CANNABINOID SCREEN URINE: ABNORMAL
CHLORIDE BLD-SCNC: 104 MEQ/L (ref 95–107)
CK MB: 12.3 NG/ML (ref 0–6.7)
CK MB: 15.3 NG/ML (ref 0–6.7)
CK MB: 9.1 NG/ML (ref 0–6.7)
CK MB: 9.1 NG/ML (ref 0–6.7)
CLARITY: CLEAR
CO2: 22 MEQ/L (ref 20–31)
COCAINE METABOLITE SCREEN URINE: ABNORMAL
COLOR: ABNORMAL
CREAT SERPL-MCNC: 0.79 MG/DL (ref 0.7–1.2)
CREATINE KINASE-MB INDEX: 1.6 % (ref 0–3.5)
CREATINE KINASE-MB INDEX: 1.6 % (ref 0–3.5)
CREATINE KINASE-MB INDEX: 1.7 % (ref 0–3.5)
CREATINE KINASE-MB INDEX: 1.9 % (ref 0–3.5)
EKG ATRIAL RATE: 107 BPM
EKG P AXIS: 55 DEGREES
EKG P-R INTERVAL: 150 MS
EKG Q-T INTERVAL: 364 MS
EKG QRS DURATION: 88 MS
EKG QTC CALCULATION (BAZETT): 485 MS
EKG R AXIS: 88 DEGREES
EKG T AXIS: 32 DEGREES
EKG VENTRICULAR RATE: 107 BPM
EOSINOPHILS ABSOLUTE: 0.2 K/UL (ref 0–0.7)
EOSINOPHILS RELATIVE PERCENT: 2 %
EPITHELIAL CELLS, UA: ABNORMAL /HPF (ref 0–5)
GFR AFRICAN AMERICAN: >60
GFR NON-AFRICAN AMERICAN: >60
GLOBULIN: 3 G/DL (ref 2.3–3.5)
GLUCOSE BLD-MCNC: 106 MG/DL (ref 70–99)
GLUCOSE URINE: NEGATIVE MG/DL
HCT VFR BLD CALC: 48.3 % (ref 42–52)
HEMOGLOBIN: 16.6 G/DL (ref 14–18)
HYALINE CASTS: ABNORMAL /HPF (ref 0–5)
KETONES, URINE: 15 MG/DL
LACTIC ACID: 0.9 MMOL/L (ref 0.5–2.2)
LACTIC ACID: 1 MMOL/L (ref 0.5–2.2)
LACTIC ACID: 1.1 MMOL/L (ref 0.5–2.2)
LEUKOCYTE ESTERASE, URINE: NEGATIVE
LYMPHOCYTES ABSOLUTE: 4.1 K/UL (ref 1–4.8)
LYMPHOCYTES RELATIVE PERCENT: 49 %
Lab: ABNORMAL
MCH RBC QN AUTO: 29.1 PG (ref 27–31.3)
MCHC RBC AUTO-ENTMCNC: 34.3 % (ref 33–37)
MCV RBC AUTO: 84.9 FL (ref 80–100)
METHADONE SCREEN, URINE: ABNORMAL
MONOCYTES ABSOLUTE: 1.1 K/UL (ref 0.2–0.8)
MONOCYTES RELATIVE PERCENT: 13.7 %
NEUTROPHILS ABSOLUTE: 2.1 K/UL (ref 1.4–6.5)
NEUTROPHILS RELATIVE PERCENT: 28 %
NITRITE, URINE: NEGATIVE
OPIATE SCREEN URINE: ABNORMAL
OXYCODONE URINE: ABNORMAL
PDW BLD-RTO: 13.9 % (ref 11.5–14.5)
PH UA: 6 (ref 5–9)
PHENCYCLIDINE SCREEN URINE: ABNORMAL
PLATELET # BLD: 346 K/UL (ref 130–400)
PLATELET SLIDE REVIEW: ADEQUATE
POTASSIUM REFLEX MAGNESIUM: 3.9 MEQ/L (ref 3.4–4.9)
PROMONOCYTES: 1 %
PROPOXYPHENE SCREEN: ABNORMAL
PROTEIN UA: 30 MG/DL
RBC # BLD: 5.69 M/UL (ref 4.7–6.1)
RBC # BLD: NORMAL 10*6/UL
RBC UA: ABNORMAL /HPF (ref 0–5)
SODIUM BLD-SCNC: 137 MEQ/L (ref 135–144)
SPECIFIC GRAVITY UA: 1.04 (ref 1–1.03)
TOTAL CK: 553 U/L (ref 0–190)
TOTAL CK: 577 U/L (ref 0–190)
TOTAL CK: 711 U/L (ref 0–190)
TOTAL CK: 806 U/L (ref 0–190)
TOTAL PROTEIN: 6.9 G/DL (ref 6.3–8)
URINE REFLEX TO CULTURE: ABNORMAL
UROBILINOGEN, URINE: 1 E.U./DL
WBC # BLD: 7.5 K/UL (ref 4.8–10.8)
WBC UA: ABNORMAL /HPF (ref 0–5)

## 2022-02-16 PROCEDURE — 2580000003 HC RX 258: Performed by: STUDENT IN AN ORGANIZED HEALTH CARE EDUCATION/TRAINING PROGRAM

## 2022-02-16 PROCEDURE — 82550 ASSAY OF CK (CPK): CPT

## 2022-02-16 PROCEDURE — G0378 HOSPITAL OBSERVATION PER HR: HCPCS

## 2022-02-16 PROCEDURE — 80053 COMPREHEN METABOLIC PANEL: CPT

## 2022-02-16 PROCEDURE — 96361 HYDRATE IV INFUSION ADD-ON: CPT

## 2022-02-16 PROCEDURE — 96375 TX/PRO/DX INJ NEW DRUG ADDON: CPT

## 2022-02-16 PROCEDURE — 6360000002 HC RX W HCPCS: Performed by: NURSE PRACTITIONER

## 2022-02-16 PROCEDURE — 96372 THER/PROPH/DIAG INJ SC/IM: CPT

## 2022-02-16 PROCEDURE — 36415 COLL VENOUS BLD VENIPUNCTURE: CPT

## 2022-02-16 PROCEDURE — 6360000002 HC RX W HCPCS: Performed by: STUDENT IN AN ORGANIZED HEALTH CARE EDUCATION/TRAINING PROGRAM

## 2022-02-16 PROCEDURE — 82553 CREATINE MB FRACTION: CPT

## 2022-02-16 PROCEDURE — 96376 TX/PRO/DX INJ SAME DRUG ADON: CPT

## 2022-02-16 PROCEDURE — 2580000003 HC RX 258: Performed by: NURSE PRACTITIONER

## 2022-02-16 PROCEDURE — 83605 ASSAY OF LACTIC ACID: CPT

## 2022-02-16 RX ORDER — MIDAZOLAM HYDROCHLORIDE 2 MG/2ML
1 INJECTION, SOLUTION INTRAMUSCULAR; INTRAVENOUS ONCE
Status: COMPLETED | OUTPATIENT
Start: 2022-02-16 | End: 2022-02-16

## 2022-02-16 RX ORDER — KETOROLAC TROMETHAMINE 30 MG/ML
30 INJECTION, SOLUTION INTRAMUSCULAR; INTRAVENOUS ONCE
Status: COMPLETED | OUTPATIENT
Start: 2022-02-16 | End: 2022-02-16

## 2022-02-16 RX ORDER — ONDANSETRON 2 MG/ML
4 INJECTION INTRAMUSCULAR; INTRAVENOUS EVERY 6 HOURS PRN
Status: DISCONTINUED | OUTPATIENT
Start: 2022-02-16 | End: 2022-02-21 | Stop reason: HOSPADM

## 2022-02-16 RX ORDER — ONDANSETRON 4 MG/1
4 TABLET, ORALLY DISINTEGRATING ORAL EVERY 8 HOURS PRN
Status: DISCONTINUED | OUTPATIENT
Start: 2022-02-16 | End: 2022-02-21 | Stop reason: HOSPADM

## 2022-02-16 RX ORDER — 0.9 % SODIUM CHLORIDE 0.9 %
1000 INTRAVENOUS SOLUTION INTRAVENOUS ONCE
Status: COMPLETED | OUTPATIENT
Start: 2022-02-16 | End: 2022-02-16

## 2022-02-16 RX ORDER — ACETAMINOPHEN 650 MG/1
650 SUPPOSITORY RECTAL EVERY 6 HOURS PRN
Status: DISCONTINUED | OUTPATIENT
Start: 2022-02-16 | End: 2022-02-21 | Stop reason: HOSPADM

## 2022-02-16 RX ORDER — BENZTROPINE MESYLATE 1 MG/ML
2 INJECTION INTRAMUSCULAR; INTRAVENOUS
Status: DISCONTINUED | OUTPATIENT
Start: 2022-02-16 | End: 2022-02-17

## 2022-02-16 RX ORDER — SODIUM CHLORIDE 0.9 % (FLUSH) 0.9 %
5-40 SYRINGE (ML) INJECTION PRN
Status: DISCONTINUED | OUTPATIENT
Start: 2022-02-16 | End: 2022-02-21 | Stop reason: HOSPADM

## 2022-02-16 RX ORDER — SODIUM CHLORIDE 9 MG/ML
INJECTION, SOLUTION INTRAVENOUS CONTINUOUS
Status: DISCONTINUED | OUTPATIENT
Start: 2022-02-16 | End: 2022-02-20

## 2022-02-16 RX ORDER — LORAZEPAM 2 MG/ML
2 INJECTION INTRAMUSCULAR
Status: DISCONTINUED | OUTPATIENT
Start: 2022-02-16 | End: 2022-02-20

## 2022-02-16 RX ORDER — ACETAMINOPHEN 325 MG/1
650 TABLET ORAL EVERY 6 HOURS PRN
Status: DISCONTINUED | OUTPATIENT
Start: 2022-02-16 | End: 2022-02-21 | Stop reason: HOSPADM

## 2022-02-16 RX ORDER — SODIUM CHLORIDE 9 MG/ML
25 INJECTION, SOLUTION INTRAVENOUS PRN
Status: DISCONTINUED | OUTPATIENT
Start: 2022-02-16 | End: 2022-02-21 | Stop reason: HOSPADM

## 2022-02-16 RX ORDER — OXCARBAZEPINE 150 MG/1
150 TABLET, FILM COATED ORAL DAILY
Status: ON HOLD | COMMUNITY
End: 2022-02-21 | Stop reason: HOSPADM

## 2022-02-16 RX ORDER — MIDAZOLAM HYDROCHLORIDE 2 MG/2ML
2 INJECTION, SOLUTION INTRAMUSCULAR; INTRAVENOUS ONCE
Status: COMPLETED | OUTPATIENT
Start: 2022-02-16 | End: 2022-02-16

## 2022-02-16 RX ORDER — POLYETHYLENE GLYCOL 3350 17 G/17G
17 POWDER, FOR SOLUTION ORAL DAILY PRN
Status: DISCONTINUED | OUTPATIENT
Start: 2022-02-16 | End: 2022-02-21 | Stop reason: HOSPADM

## 2022-02-16 RX ORDER — SODIUM CHLORIDE 0.9 % (FLUSH) 0.9 %
5-40 SYRINGE (ML) INJECTION EVERY 12 HOURS SCHEDULED
Status: DISCONTINUED | OUTPATIENT
Start: 2022-02-16 | End: 2022-02-21 | Stop reason: HOSPADM

## 2022-02-16 RX ORDER — MIDAZOLAM HYDROCHLORIDE 2 MG/2ML
2 INJECTION, SOLUTION INTRAMUSCULAR; INTRAVENOUS ONCE
Status: DISCONTINUED | OUTPATIENT
Start: 2022-02-16 | End: 2022-02-16

## 2022-02-16 RX ADMIN — MIDAZOLAM HYDROCHLORIDE 1 MG: 1 INJECTION, SOLUTION INTRAMUSCULAR; INTRAVENOUS at 02:31

## 2022-02-16 RX ADMIN — SODIUM CHLORIDE 1000 ML: 9 INJECTION, SOLUTION INTRAVENOUS at 00:07

## 2022-02-16 RX ADMIN — LORAZEPAM 2 MG: 2 INJECTION INTRAMUSCULAR; INTRAVENOUS at 13:50

## 2022-02-16 RX ADMIN — SODIUM CHLORIDE 1000 ML: 9 INJECTION, SOLUTION INTRAVENOUS at 00:02

## 2022-02-16 RX ADMIN — MIDAZOLAM HYDROCHLORIDE 1 MG: 1 INJECTION, SOLUTION INTRAMUSCULAR; INTRAVENOUS at 02:30

## 2022-02-16 RX ADMIN — ONDANSETRON 4 MG: 2 INJECTION INTRAMUSCULAR; INTRAVENOUS at 08:03

## 2022-02-16 RX ADMIN — LORAZEPAM 2 MG: 2 INJECTION INTRAMUSCULAR; INTRAVENOUS at 17:32

## 2022-02-16 RX ADMIN — SODIUM CHLORIDE: 9 INJECTION, SOLUTION INTRAVENOUS at 08:02

## 2022-02-16 RX ADMIN — ENOXAPARIN SODIUM 40 MG: 100 INJECTION SUBCUTANEOUS at 11:55

## 2022-02-16 RX ADMIN — MIDAZOLAM HYDROCHLORIDE 2 MG: 1 INJECTION, SOLUTION INTRAMUSCULAR; INTRAVENOUS at 01:04

## 2022-02-16 RX ADMIN — KETOROLAC TROMETHAMINE 30 MG: 30 INJECTION, SOLUTION INTRAMUSCULAR at 01:03

## 2022-02-16 RX ADMIN — Medication 10 ML: at 11:11

## 2022-02-16 RX ADMIN — LORAZEPAM 2 MG: 2 INJECTION INTRAMUSCULAR; INTRAVENOUS at 08:04

## 2022-02-16 RX ADMIN — BENZTROPINE MESYLATE 2 MG: 1 INJECTION INTRAMUSCULAR; INTRAVENOUS at 17:30

## 2022-02-16 ASSESSMENT — ENCOUNTER SYMPTOMS
SHORTNESS OF BREATH: 0
COUGH: 0
RHINORRHEA: 0
NAUSEA: 0
BACK PAIN: 0
ABDOMINAL PAIN: 0
DIARRHEA: 0
SORE THROAT: 0
VOMITING: 0
PHOTOPHOBIA: 0
WHEEZING: 0

## 2022-02-16 ASSESSMENT — PAIN SCALES - GENERAL
PAINLEVEL_OUTOF10: 0
PAINLEVEL_OUTOF10: 10

## 2022-02-16 NOTE — ED PROVIDER NOTES
[address] [facility] ED  EMERGENCY DEPARTMENT ENCOUNTER      Pt Name: Trevon López  MRN: [id number]  [place] 1995  Date of evaluation: 2/15/2022  Provider: Gricel Nguyen Dr       Chief Complaint   Patient presents with    Shortness of Breath     pt c/o trouble breathing         HISTORY OF PRESENT ILLNESS   (Location/Symptom, Timing/Onset, Context/Setting, Quality, Duration, Modifying Factors, Severity)  Note limiting factors. Trevon López is a 32 y.o. male who per chart review has pmhx of HTN, ADHD, bipolar 1 disorder, mental retardation, obesity, CHRISTOPHER, HTN presents to the emergency department for evaluation of severe myalgias and muscle cramping, mostly in the bilateral hands/arms. This is the patient's third visit to the emergency department for similar. He was dx with TD after starting Abilify. He has been weaned off of Abilify, has not been taking it since December. He is currently taking Trileptal for his bipolar disorder. He was on Austedo for TD, subsequently taken off Austedo and placed on Cogentin instead. He takes benadryl, ativan, and tylenol for sx at home as well. Mom states there are a few days where he is asx and other days that he has severe episodes. Overall, not having much improvement. Episodes associated with diaphoresis and panic. Pt denies fever, chills, chest pain, sob, back or neck pain, abd pain, nvd, headache, dizziness, visual changes, numbness, tingling, weakness. HPI    Nursing Notes were reviewed. REVIEW OF SYSTEMS    (2-9 systems for level 4, 10 or more for level 5)     Review of Systems   Constitutional: Negative for chills, fatigue and fever. HENT: Negative for congestion. Eyes: Negative for photophobia and visual disturbance. Respiratory: Negative for cough, shortness of breath and wheezing. Cardiovascular: Negative for chest pain and palpitations. Gastrointestinal: Negative for abdominal pain, diarrhea, nausea and vomiting. Genitourinary: Negative for dysuria, frequency and hematuria. Musculoskeletal: Positive for arthralgias and myalgias. Negative for back pain, neck pain and neck stiffness. Allergic/Immunologic: Negative for immunocompromised state. Neurological: Negative for dizziness, weakness and headaches. Psychiatric/Behavioral: The patient is nervous/anxious. All other systems reviewed and are negative. Except as noted above the remainder of the review of systems was reviewed and negative. PAST MEDICAL HISTORY     Past Medical History:   Diagnosis Date    ADHD (attention deficit hyperactivity disorder) 4/4/2012    Allergic rhinitis 4/4/2012    Hypertension     Obesity (BMI 30-39.9) 4/4/2012    Sleep apnea          SURGICAL HISTORY       Past Surgical History:   Procedure Laterality Date    TONSILLECTOMY           CURRENT MEDICATIONS       Previous Medications    AMLODIPINE (NORVASC) 10 MG TABLET    TAKE 1 TABLET BY MOUTH EVERY DAY    BENZTROPINE (COGENTIN) 1 MG TABLET    Take 0.5 mg by mouth 2 times daily     BLOOD PRESSURE MONITORING (ADULT BLOOD PRESSURE CUFF LG) KIT    Check blood pressure every other day    HYDROCHLOROTHIAZIDE (HYDRODIURIL) 25 MG TABLET    TAKE 1 TABLET BY MOUTH EVERY MORNING    LOSARTAN (COZAAR) 100 MG TABLET    TAKE 1 TABLET BY MOUTH EVERY DAY    OXCARBAZEPINE (TRILEPTAL) 150 MG TABLET    Take 150 mg by mouth daily       ALLERGIES     Patient has no known allergies.     FAMILY HISTORY       Family History   Problem Relation Age of Onset    No Known Problems Mother     No Known Problems Father           SOCIAL HISTORY       Social History     Socioeconomic History    Marital status: Single     Spouse name: Not on file    Number of children: Not on file    Years of education: Not on file    Highest education level: Not on file   Occupational History    Not on file   Tobacco Use    Smoking status: Former Smoker     Packs/day: 0.50     Years: 7.00     Pack years: 3.50 Types: Cigarettes     Start date: 11/25/2013     Quit date: 6/1/2018     Years since quitting: 3.7    Smokeless tobacco: Never Used   Substance and Sexual Activity    Alcohol use: Yes     Comment: occ    Drug use: No    Sexual activity: Not on file   Other Topics Concern    Not on file   Social History Narrative    Not on file     Social Determinants of Health     Financial Resource Strain:     Difficulty of Paying Living Expenses: Not on file   Food Insecurity:     Worried About Running Out of Food in the Last Year: Not on file    Jakob of Food in the Last Year: Not on file   Transportation Needs:     Lack of Transportation (Medical): Not on file    Lack of Transportation (Non-Medical):  Not on file   Physical Activity:     Days of Exercise per Week: Not on file    Minutes of Exercise per Session: Not on file   Stress:     Feeling of Stress : Not on file   Social Connections:     Frequency of Communication with Friends and Family: Not on file    Frequency of Social Gatherings with Friends and Family: Not on file    Attends Latter day Services: Not on file    Active Member of [address] [place] [address] or Organizations: Not on file    Attends Club or Organization Meetings: Not on file    Marital Status: Not on file   Intimate Partner Violence:     Fear of Current or Ex-Partner: Not on file    Emotionally Abused: Not on file    Physically Abused: Not on file    Sexually Abused: Not on file   Housing Stability:     Unable to Pay for Housing in the Last Year: Not on file    Number of [place] in the Last Year: Not on file    Unstable Housing in the Last Year: Not on file       SCREENINGS                               CIWA Assessment  BP: (!) 163/86  Pulse: 106                 PHYSICAL EXAM    (up to 7 for level 4, 8 or more for level 5)     ED Triage Vitals   BP Temp Temp Source Pulse Resp SpO2 Height Weight   02/15/22 2245 02/16/22 0103 02/16/22 0103 02/15/22 2218 02/15/22 2218 02/15/22 2218 -- --   Ga Driscoll 148/98 98.4 °F (36.9 °C) Oral 150 (!) 40 98 %         Physical Exam  Constitutional:       General: He is in acute distress. Appearance: He is well-developed. He is diaphoretic. He is not ill-appearing or toxic-appearing. HENT:      Head: Normocephalic and atraumatic. Nose: Nose normal.      Mouth/Throat:      Mouth: Mucous membranes are moist.   Eyes:      Pupils: Pupils are equal, round, and reactive to light. Cardiovascular:      Rate and Rhythm: Regular rhythm. Tachycardia present. Heart sounds: No murmur heard. No friction rub. No gallop. Pulmonary:      Effort: Pulmonary effort is normal. Tachypnea present. No accessory muscle usage, prolonged expiration or respiratory distress. Breath sounds: Normal breath sounds. No decreased breath sounds, wheezing, rhonchi or rales. Abdominal:      General: Bowel sounds are normal. There is no distension. Palpations: Abdomen is soft. Tenderness: There is no abdominal tenderness. There is no guarding or rebound. Musculoskeletal:         General: No swelling. Cervical back: Normal range of motion. Skin:     General: Skin is warm. Capillary Refill: Capillary refill takes less than 2 seconds. Findings: No rash. Neurological:      General: No focal deficit present. Mental Status: He is alert and oriented to person, place, and time. GCS: GCS eye subscore is 4. GCS verbal subscore is 5. GCS motor subscore is 6. Cranial Nerves: Cranial nerves are intact. Sensory: Sensation is intact. Motor: Motor function is intact. Coordination: Coordination is intact. Gait: Gait is intact. Psychiatric:         Mood and Affect: Mood is anxious. Speech: Speech is rapid and pressured. Behavior: Behavior is agitated. Thought Content: Thought content does not include homicidal or suicidal ideation. Thought content does not include homicidal or suicidal plan.       Comments: Pt very anxious. Uncontrollable movements of face and tongue. Contracted fingers/wrists. DIAGNOSTIC RESULTS     EKG: All EKG's are interpreted by the Emergency Department Physician who either signs or Co-signs this chart in the absence of a cardiologist.    EKG shows sinus tach with , normal axis, normal intervals, no ST changes. RADIOLOGY:   Non-plain film images such as CT, Ultrasound and MRI are read by the radiologist. Plain radiographic images are visualized and preliminarily interpreted by the emergency physician with the below findings:      Interpretation per the Radiologist below, if available at the time of this note:    XR CHEST PORTABLE    (Results Pending)         ED BEDSIDE ULTRASOUND:   Performed by ED Physician - none    LABS:  Labs Reviewed   COMPREHENSIVE METABOLIC PANEL - Abnormal; Notable for the following components:       Result Value    Glucose 148 (*)     Calcium 10.7 (*)     Total Protein 8.5 (*)     Albumin 5.2 (*)     Total Bilirubin 1.4 (*)     All other components within normal limits   LACTIC ACID, PLASMA - Abnormal; Notable for the following components:    Lactic Acid 3.6 (*)     All other components within normal limits    Narrative:     CALL  Adam  [facility] tel. [phone],  Lactic Acid results called to and read back by [facility], 02/15/2022  23:12, by ADAMS   CK - Abnormal; Notable for the following components: Total CK 1,024 (*)     All other components within normal limits   CKMB & RELATIVE PERCENT - Abnormal; Notable for the following components:    CK-MB 19.3 (*)     All other components within normal limits   CK - Abnormal; Notable for the following components:     Total  (*)     All other components within normal limits   CKMB & RELATIVE PERCENT - Abnormal; Notable for the following components:    CK-MB 15.3 (*)     All other components within normal limits   POCT GLUCOSE - Abnormal; Notable for the following components:    POC Glucose 144 (*)     All other components within normal limits   ETHANOL   CBC WITH AUTO DIFFERENTIAL   MAGNESIUM   TROPONIN   BRAIN NATRIURETIC PEPTIDE   LACTIC ACID, PLASMA   URINE DRUG SCREEN   URINE RT REFLEX TO CULTURE   OXCARBAZEPINE LEVEL       All other labs were within normal range or not returned as of this dictation. EMERGENCY DEPARTMENT COURSE and DIFFERENTIAL DIAGNOSIS/MDM:   Vitals:    Vitals:    02/15/22 2330 02/16/22 0030 02/16/22 0103 02/16/22 0208   BP: (!) 150/99 (!) 174/102  (!) 163/86   Pulse: 101 100 106    Resp: 18 18 24    Temp:   98.4 °F (36.9 °C)    TempSrc:   Oral    SpO2: 100% 100%         MDM     Patient is a 22-year-old male presents the ED for evaluation of severe hand/arm cramping and myalgias, uncontrollable movements of the face, history of tardive dyskinesia. He is afebrile tachycardic to 140 tachypneic to 40 breaths/min and severely diaphoretic on arrival.  He is extremely anxious and unable to sit still. His hands and wrists are noted to be contracted during episode. He was treated with 3 L of IV normal saline and multiple doses of IV and IM Versed Toradol and Benadryl. EKG shows sinus tach with , normal axis, normal intervals, no ST changes. CMP is remarkable for calcium of 10.7. Lactic acid 3.6, normalized to 1 after fluid administration. CK elevated to 1024. Downtrend to 806 after fluid administration. Patient with some improvement with Versed however requiring multiple doses in the ED. He has been in the emergency department many times for similar without any improvement at home despite medication changes via psychiatry. Spoke with DR. Ashley of psychiatry who agrees with medical admission, psychiatry consult. He states treat with ativan and cogentin as needed. Would recommend d/c benadryl and Trileptal. Dr. Angela Queen accepts to the floor. Pt stable for admission.         REASSESSMENT          CRITICAL CARE TIME   Total Critical Care time was 0 minutes, excluding separately reportable procedures. There was a high probability of clinically significant/life threatening deterioration in the patient's condition which required my urgent intervention. CONSULTS:  None    PROCEDURES:  Unless otherwise noted below, none     Procedures        FINAL IMPRESSION      1. Dystonia          DISPOSITION/PLAN   DISPOSITION Admitted 02/16/2022 03:12:31 AM      PATIENT REFERRED TO:  No follow-up provider specified. DISCHARGE MEDICATIONS:  New Prescriptions    No medications on file     Controlled Substances Monitoring:     No flowsheet data found.     (Please note that portions of this note were completed with a voice recognition program.  Efforts were made to edit the dictations but occasionally words are mis-transcribed.)    Francisco Javier Trinidad PA-C (electronically signed)             Francisco Javier Trinidad PA-C  02/17/22 [id number]

## 2022-02-16 NOTE — ED NOTES
Pt awake, placed on telemetry, reports he is \"feeling shaky in my hands and a little nauseated right now. \" Pt also c/o headache.       Lisa Castro RN  02/16/22 [id number]

## 2022-02-16 NOTE — ED NOTES
Patient becoming increasingly agitated. Visibly sweating. Requesting med to calm him. PRN order of 2mg Ativan given IV.      Sruthi Santiago RN  02/16/22 [id number]

## 2022-02-16 NOTE — H&P
[facility]  MEDICINE    HISTORY AND PHYSICAL EXAM    PATIENT NAME:  Hossein Lopez    MRN:  [id number]  SERVICE DATE:  2/16/2022   SERVICE TIME:  3:54 AM    Primary Care Physician: Destiny Toledo MD         SUBJECTIVE  CHIEF COMPLAINT: Muscle cramping and pain. HPI: Patient being admitted for dystonia. Patient has been to the ED 3 times for similar symptoms as today. Patient reports that he has severe pain in his hands bilaterally with muscle cramping. Patient has a history of ADHD, bipolar, and mental retardation and was recently taken off of Abilify in December 2021 due to developing tardive dyskinesia. Patient is now on Cogentin instead. When patient has these exacerbations he takes Ativan, Benadryl, and Tylenol. However the symptoms have continued to recur and become more severe. Patient then becomes very anxious and begins to panic. Patient was noticeably diaphoretic in the ED during these events as well as becoming tachycardic. Otherwise, patient denies any fever, cough, shortness of breath, chest pain, abdominal pain, nausea, vomiting, dysuria, loss of bowel or bladder, or generalized weakness. Patient's other past medical history includes hypertension. Patient reports compliance with his medications.     PAST MEDICAL HISTORY:    Past Medical History:   Diagnosis Date    ADHD (attention deficit hyperactivity disorder) 4/4/2012    Allergic rhinitis 4/4/2012    Hypertension     Obesity (BMI 30-39.9) 4/4/2012    Sleep apnea      PAST SURGICAL HISTORY:    Past Surgical History:   Procedure Laterality Date    TONSILLECTOMY       FAMILY HISTORY:    Family History   Problem Relation Age of Onset    No Known Problems Mother     No Known Problems Father      SOCIAL HISTORY:    Social History     Socioeconomic History    Marital status: Single     Spouse name: Not on file    Number of children: Not on file    Years of education: Not on file    Highest education level: Not on file Occupational History    Not on file   Tobacco Use    Smoking status: Former Smoker     Packs/day: 0.50     Years: 7.00     Pack years: 3.50     Types: Cigarettes     Start date: 11/25/2013     Quit date: 6/1/2018     Years since quitting: 3.7    Smokeless tobacco: Never Used   Substance and Sexual Activity    Alcohol use: Yes     Comment: occ    Drug use: No    Sexual activity: Not on file   Other Topics Concern    Not on file   Social History Narrative    Not on file     Social Determinants of Health     Financial Resource Strain:     Difficulty of Paying Living Expenses: Not on file   Food Insecurity:     Worried About Running Out of Food in the Last Year: Not on file    Jakob of Food in the Last Year: Not on file   Transportation Needs:     Lack of Transportation (Medical): Not on file    Lack of Transportation (Non-Medical): Not on file   Physical Activity:     Days of Exercise per Week: Not on file    Minutes of Exercise per Session: Not on file   Stress:     Feeling of Stress : Not on file   Social Connections:     Frequency of Communication with Friends and Family: Not on file    Frequency of Social Gatherings with Friends and Family: Not on file    Attends Scientologist Services: Not on file    Active Member of [address] [place] [address] [place] or Organizations: Not on file    Attends Club or Organization Meetings: Not on file    Marital Status: Not on file   Intimate Partner Violence:     Fear of Current or Ex-Partner: Not on file    Emotionally Abused: Not on file    Physically Abused: Not on file    Sexually Abused: Not on file   Housing Stability:     Unable to Pay for Housing in the Last Year: Not on file    Number of [place] in the Last Year: Not on file    Unstable Housing in the Last Year: Not on file     MEDICATIONS:   Prior to Admission medications    Medication Sig Start Date End Date Taking?  Authorizing Provider   OXcarbazepine (TRILEPTAL) 150 MG tablet Take 150 mg by mouth daily   Yes Pupils: Pupils are equal, round, and reactive to light. Cardiovascular:      Rate and Rhythm: Normal rate and regular rhythm. Heart sounds: Normal heart sounds. Pulmonary:      Effort: Pulmonary effort is normal. No respiratory distress. Breath sounds: Normal breath sounds. No wheezing. Abdominal:      General: Bowel sounds are normal.      Palpations: Abdomen is soft. There is no mass. Tenderness: There is no abdominal tenderness. Musculoskeletal:         General: Normal range of motion. Cervical back: Normal range of motion. Lymphadenopathy:      Cervical: No cervical adenopathy. Skin:     General: Skin is warm and dry. Capillary Refill: Capillary refill takes less than 2 seconds. Neurological:      Mental Status: He is alert and oriented to person, place, and time. Deep Tendon Reflexes: Reflexes normal.   Psychiatric:         Thought Content: Thought content normal.           DATA:     Diagnostic tests reviewed for today's visit:    Most recent labs and imaging results reviewed.      LABS:    Recent Results (from the past 24 hour(s))   POCT Glucose    Collection Time: 02/15/22 10:20 PM   Result Value Ref Range    POC Glucose 144 (H) 70 - 99 mg/dl    Performed on ACCU-CHEK    Comprehensive Metabolic Panel    Collection Time: 02/15/22 10:30 PM   Result Value Ref Range    Sodium 139 135 - 144 mEq/L    Potassium 4.0 3.4 - 4.9 mEq/L    Chloride 97 95 - 107 mEq/L    CO2 27 20 - 31 mEq/L    Anion Gap 15 9 - 15 mEq/L    Glucose 148 (H) 70 - 99 mg/dL    BUN 12 6 - 20 mg/dL    CREATININE 1.08 0.70 - 1.20 mg/dL    GFR Non-African American >60.0 >60    GFR  >60.0 >60    Calcium 10.7 (H) 8.5 - 9.9 mg/dL    Total Protein 8.5 (H) 6.3 - 8.0 g/dL    Albumin 5.2 (H) 3.5 - 4.6 g/dL    Total Bilirubin 1.4 (H) 0.2 - 0.7 mg/dL    Alkaline Phosphatase 91 35 - 104 U/L    ALT 41 0 - 41 U/L    AST 38 0 - 40 U/L    Globulin 3.3 2.3 - 3.5 g/dL   Ethanol    Collection Time: start    ASSESSMENT AND PLAN  Dystonia: Recurrent and worsening painful muscle contractions of the hands bilaterally. Started after stopping Abilify in December 2021. We will consult psych. We will administer Ativan and Cogentin as needed per psych recommendations to ER staff. Elevated CK: CK 1024. Patient received 3 L NS in ED. Repeat CK decreased to 806. We will continue IV hydration. We will monitor CK. Elevated lactic acid: Lactic acid 3.6. Patient received 3 L normal saline in ED. Repeat lactic acid 1.0  We will continue IV hydration. We will repeat lactic acid    Active Problems:  Hypertension: Patient on home meds to control  We will resume home meds  ADHD: Patient on home meds to control. We will resume home meds. Bipolar 1 disorder, depressed, severe: Patient on home meds to control. We will resume home meds        Plan of care discussed with: patient    SIGNATURE: Devika OliverosCOLT - CNP  DATE: February 16, 2022  TIME: 3:54 AM     YADIRA Pina MD - supervising physician

## 2022-02-16 NOTE — PROGRESS NOTES
Patient seen and examined. Will in addition consult neurology for their assistance. The patient has no complaints other than wanting a bed upstairs at the time of my examination.     Maryelizabeth Spatz

## 2022-02-17 ENCOUNTER — APPOINTMENT (OUTPATIENT)
Dept: CT IMAGING | Age: 27
DRG: 092 | End: 2022-02-17
Payer: MEDICARE

## 2022-02-17 PROCEDURE — 96375 TX/PRO/DX INJ NEW DRUG ADDON: CPT

## 2022-02-17 PROCEDURE — 6360000002 HC RX W HCPCS: Performed by: INTERNAL MEDICINE

## 2022-02-17 PROCEDURE — 1210000000 HC MED SURG R&B

## 2022-02-17 PROCEDURE — 6370000000 HC RX 637 (ALT 250 FOR IP): Performed by: PSYCHIATRY & NEUROLOGY

## 2022-02-17 PROCEDURE — 90792 PSYCH DIAG EVAL W/MED SRVCS: CPT | Performed by: PSYCHIATRY & NEUROLOGY

## 2022-02-17 PROCEDURE — 6370000000 HC RX 637 (ALT 250 FOR IP): Performed by: NURSE PRACTITIONER

## 2022-02-17 PROCEDURE — 70450 CT HEAD/BRAIN W/O DYE: CPT

## 2022-02-17 PROCEDURE — 2580000003 HC RX 258: Performed by: NURSE PRACTITIONER

## 2022-02-17 PROCEDURE — 99221 1ST HOSP IP/OBS SF/LOW 40: CPT | Performed by: PSYCHIATRY & NEUROLOGY

## 2022-02-17 PROCEDURE — 96372 THER/PROPH/DIAG INJ SC/IM: CPT

## 2022-02-17 PROCEDURE — 6360000002 HC RX W HCPCS: Performed by: NURSE PRACTITIONER

## 2022-02-17 PROCEDURE — 95816 EEG AWAKE AND DROWSY: CPT

## 2022-02-17 PROCEDURE — 95816 EEG AWAKE AND DROWSY: CPT | Performed by: PSYCHIATRY & NEUROLOGY

## 2022-02-17 PROCEDURE — 96376 TX/PRO/DX INJ SAME DRUG ADON: CPT

## 2022-02-17 RX ORDER — MORPHINE SULFATE 2 MG/ML
2 INJECTION, SOLUTION INTRAMUSCULAR; INTRAVENOUS ONCE
Status: COMPLETED | OUTPATIENT
Start: 2022-02-17 | End: 2022-02-17

## 2022-02-17 RX ORDER — DIAZEPAM 5 MG/1
5 TABLET ORAL 2 TIMES DAILY
Status: DISCONTINUED | OUTPATIENT
Start: 2022-02-17 | End: 2022-02-18

## 2022-02-17 RX ORDER — TRIHEXYPHENIDYL HYDROCHLORIDE 2 MG/1
2 TABLET ORAL 2 TIMES DAILY
Status: DISCONTINUED | OUTPATIENT
Start: 2022-02-17 | End: 2022-02-21 | Stop reason: HOSPADM

## 2022-02-17 RX ORDER — BENZTROPINE MESYLATE 1 MG/1
2 TABLET ORAL 2 TIMES DAILY
Status: DISCONTINUED | OUTPATIENT
Start: 2022-02-17 | End: 2022-02-21 | Stop reason: HOSPADM

## 2022-02-17 RX ADMIN — Medication 650 MG: at 06:54

## 2022-02-17 RX ADMIN — LORAZEPAM 2 MG: 2 INJECTION INTRAMUSCULAR; INTRAVENOUS at 08:21

## 2022-02-17 RX ADMIN — LORAZEPAM 2 MG: 2 INJECTION INTRAMUSCULAR; INTRAVENOUS at 20:35

## 2022-02-17 RX ADMIN — LORAZEPAM 2 MG: 2 INJECTION INTRAMUSCULAR; INTRAVENOUS at 13:06

## 2022-02-17 RX ADMIN — SODIUM CHLORIDE: 9 INJECTION, SOLUTION INTRAVENOUS at 20:56

## 2022-02-17 RX ADMIN — BENZTROPINE MESYLATE 2 MG: 1 TABLET ORAL at 20:29

## 2022-02-17 RX ADMIN — TRIHEXYPHENIDYL HYDROCHLORIDE 2 MG: 2 TABLET ORAL at 13:06

## 2022-02-17 RX ADMIN — Medication 10 ML: at 09:00

## 2022-02-17 RX ADMIN — BENZTROPINE MESYLATE 2 MG: 1 INJECTION INTRAMUSCULAR; INTRAVENOUS at 06:55

## 2022-02-17 RX ADMIN — DIAZEPAM 5 MG: 5 TABLET ORAL at 20:29

## 2022-02-17 RX ADMIN — MORPHINE SULFATE 2 MG: 2 INJECTION, SOLUTION INTRAMUSCULAR; INTRAVENOUS at 08:52

## 2022-02-17 RX ADMIN — BENZTROPINE MESYLATE 2 MG: 1 TABLET ORAL at 13:06

## 2022-02-17 RX ADMIN — ENOXAPARIN SODIUM 40 MG: 100 INJECTION SUBCUTANEOUS at 08:21

## 2022-02-17 RX ADMIN — TRIHEXYPHENIDYL HYDROCHLORIDE 2 MG: 2 TABLET ORAL at 20:29

## 2022-02-17 ASSESSMENT — ENCOUNTER SYMPTOMS
BACK PAIN: 0
VOMITING: 0
NAUSEA: 0
CHOKING: 0
COLOR CHANGE: 0
PHOTOPHOBIA: 0
SHORTNESS OF BREATH: 0
TROUBLE SWALLOWING: 0

## 2022-02-17 ASSESSMENT — PAIN SCALES - GENERAL
PAINLEVEL_OUTOF10: 8
PAINLEVEL_OUTOF10: 10

## 2022-02-17 NOTE — CONSULTS
[address] [place] [address] Department of Psychiatry  Behavioral Health Consult    REASON FOR CONSULT: involuntary movement    CONSULTING PHYSICIAN:     History obtained from: patient    HISTORY OF PRESENT ILLNESS:      The patient is a 32 y.o. male with significant past psychiatric history of Bipolar disorder and Autism who presents with involuntary dyskinetic movement. Patient was taking Depakote and Abilify for many years. His medication Abilify was recently increased from 10 mg to 15 mg by his psychiatrist.  Since the increase which happened a few weeks ago patient started noticing worsening of his involuntary movement of his whole body. It started with some having restless type of movement in his legs and his hands and then it gradually spread to the face. He is currently having some involuntary movement around the perioral region and the facial muscles. Patient appeared calm and relaxed. Mom was at the bedside gave most of the history    Patient is currently being worked up for any neurological cause of her symptoms    Mentally he has been doing good. He denies any depressive symptoms. No active suicidal thoughts. He denies any mood swings or audiovisual hallucinations    Psychiatrist tapered the Abilify and Depakote and he was put on Trileptal for his bipolar disorder        The patient is currently receiving care for the above psychiatric illness.       Psychiatric Review of Systems       Depression: denies     Irene or Hypomania:  no     Panic Attacks:  no     Phobias:  no     Obsessions and Compulsions:  no     PTSD : no     Hallucinations:  no     Delusions:  no      Substance Abuse History:  ETOH: no  Marijuana: no  Opiates: no  Other Drugs: no      Past Psychiatric History:  History of autism and bipolar disorder    Past Medical History:        Diagnosis Date    ADHD (attention deficit hyperactivity disorder) 4/4/2012    Allergic rhinitis 4/4/2012    Hypertension     Obesity (BMI 30-39.9) 4/4/2012  Sleep apnea        Past Surgical History:        Procedure Laterality Date    TONSILLECTOMY         Medications Prior to Admission:   Medications Prior to Admission: OXcarbazepine (TRILEPTAL) 150 MG tablet, Take 150 mg by mouth daily  amLODIPine (NORVASC) 10 MG tablet, TAKE 1 TABLET BY MOUTH EVERY DAY  hydroCHLOROthiazide (HYDRODIURIL) 25 MG tablet, TAKE 1 TABLET BY MOUTH EVERY MORNING  losartan (COZAAR) 100 MG tablet, TAKE 1 TABLET BY MOUTH EVERY DAY  benztropine (COGENTIN) 1 MG tablet, Take 0.5 mg by mouth 2 times daily   Blood Pressure Monitoring (ADULT BLOOD PRESSURE CUFF LG) KIT, Check blood pressure every other day    Allergies:  Patient has no known allergies. FAMILY/SOCIAL HISTORY:  Family History   Problem Relation Age of Onset    No Known Problems Mother     No Known Problems Father      Social History     Socioeconomic History    Marital status: Single     Spouse name: Not on file    Number of children: Not on file    Years of education: Not on file    Highest education level: Not on file   Occupational History    Not on file   Tobacco Use    Smoking status: Former Smoker     Packs/day: 0.50     Years: 7.00     Pack years: 3.50     Types: Cigarettes     Start date: 11/25/2013     Quit date: 6/1/2018     Years since quitting: 3.7    Smokeless tobacco: Never Used   Substance and Sexual Activity    Alcohol use: Yes     Comment: occ    Drug use: No    Sexual activity: Not on file   Other Topics Concern    Not on file   Social History Narrative    Not on file     Social Determinants of Health     Financial Resource Strain:     Difficulty of Paying Living Expenses: Not on file   Food Insecurity:     Worried About Running Out of Food in the Last Year: Not on file    Jakob of Food in the Last Year: Not on file   Transportation Needs:     Lack of Transportation (Medical): Not on file    Lack of Transportation (Non-Medical):  Not on file   Physical Activity:     Days of Exercise per Week: Not on file    Minutes of Exercise per Session: Not on file   Stress:     Feeling of Stress : Not on file   Social Connections:     Frequency of Communication with Friends and Family: Not on file    Frequency of Social Gatherings with Friends and Family: Not on file    Attends Jainism Services: Not on file    Active Member of Clubs or Organizations: Not on file    Attends Club or Organization Meetings: Not on file    Marital Status: Not on file   Intimate Partner Violence:     Fear of Current or Ex-Partner: Not on file    Emotionally Abused: Not on file    Physically Abused: Not on file    Sexually Abused: Not on file   Housing Stability:     Unable to Pay for Housing in the Last Year: Not on file    Number of [place] in the Last Year: Not on file    Unstable Housing in the Last Year: Not on file       REVIEW OF SYSTEMS    Constitutional: [] fever  [] chills  [] weight loss  []weakness [] Other:  Eyes:  [] photophobia  [] discharge [] acuity change   [] Diplopia   [] Other:  HENT:  [] sore throat  [] ear pain [] Tinnitus   [] Other  Respiratory:  [] Cough  [] Shortness of breath   [] Sputum   [] Other:   Cardiac: []Chest pain   []Palpitations []Edema  []PND  [] Other:  GI:  []Abdominal pain   []Nausea  []Vomiting  []Diarrhea  [] Other:  :  [] Dysuria   []Frequency  []Hematuria  []Discharge  [] Other:  Possible Pregnancy: []Yes   []No   LMP:   Musculoskeletal:  []Back pain  []Neck pain  []Recent Injury   Skin:  []Rash  [] Itching  [] Other:  Neurologic:  [] Headache  [] Focal weakness  [] Sensory changes []Other:  Endocrine:  [] Polyuria  [] Polydipsia  [] Hair Loss  [] Other:  Lymphatic:   [] Swollen glands   Psychiatric:  As per HPI      All other systems negative except as marked or mentioned/indicated in the HPI. Randal Jackson      PHYSICAL EXAM:  Vitals:  BP (!) 160/65   Pulse 110   Temp 98.1 °F (36.7 °C) (Oral)   Resp 18   Ht 5' 6\" (1.676 m)   Wt 280 lb (127 kg)   SpO2 100%   BMI 45.19 kg/m²      Neuro Exam:   Muscle Strength & Tone: full ROM  Gait: normal gait   Involuntary Movements: Yes    Mental Status Examination:    Level of consciousness:  within normal limits   Appearance:  well-appearing  Behavior/Motor:  no abnormalities noted  Attitude toward examiner:  cooperative  Speech:  normal rate   Mood: anxious  Affect:  mood congruent  Thought processes:  coherent   Thought content:  Suicidal Ideation:  denies suicidal ideation  Cognition:  oriented to person, place, and time   Concentration intact  Memory intact  Mini Mental Status not completed because   Insight fair   Judgement fair   Fund of Knowledge good      DIAGNOSIS:    Tardive Akathisia and dyskinetic movement          LABS: REVIEWED TODAY:  Recent Labs     02/15/22  2230   WBC 7.5   HGB 16.6        Recent Labs     02/15/22  2230 02/16/22  0638    137   K 4.0 3.9   CL 97 104   CO2 27 22   BUN 12 13   CREATININE 1.08 0.79   GLUCOSE 148* 106*     Recent Labs     02/15/22  2230 02/16/22  0638   BILITOT 1.4* 0.9*   ALKPHOS 91 71   AST 38 33   ALT 41 33     Lab Results   Component Value Date    LABAMPH Neg 02/15/2022    BARBSCNU Neg 02/15/2022    LABBENZ POSITIVE 02/15/2022    LABMETH Neg 02/15/2022    OPIATESCREENURINE Neg 02/15/2022    PHENCYCLIDINESCREENURINE Neg 02/15/2022    ETOH <10 02/15/2022     Lab Results   Component Value Date    TSH 1.120 01/25/2022     No results found for: LITHIUM  Lab Results   Component Value Date    VALPROATE 3.9 (L) 01/25/2022     Lab Results   Component Value Date    VALPROATE 3.9 01/25/2022       FURTHER LABS ORDERED :      Radiology  CT HEAD WO CONTRAST    Result Date: 2/17/2022  CT Brain. Contrast medium:  without contrast.. History:  Dystonia. Technical factors: CT imaging of the brain was obtained and formatted as 5 mm contiguous axial images. 2.5 mm contiguous axial images were obtained through the osseous structures. Sagittal and coronal reconstruction obtained during postprocessing. Comparison:  None. Findings: Extra-axial spaces:  Normal. Intracranial hemorrhage:  None. Ventricular system: [Negative. Basal Cisterns:  Without anomaly. Cerebral Parenchyma: Without anomaly. Midline Shift:  None. Cerebellum:  No anomaly identified. Paranasal sinuses and mastoid air cells:  Bilateral maxillary mucosal thickening with rounded soft tissue attenuation, bilateral maxillary sinuses, greater on left. Visualized Orbits:  Negative. Impression: Bilateral maxillary sinusitis with bilateral maxillary retention cysts, greater on left. All CT scans at this facility use dose modulation, iterative reconstruction, and/or weight based dosing when appropriate to reduce radiation dose to as low as reasonably achievable. XR CHEST PORTABLE    Result Date: 2/16/2022  EXAMINATION: CHEST PORTABLE VIEW  CLINICAL HISTORY: Short of breath COMPARISONS: April 21, 2006  FINDINGS: Single  views of the chest is submitted. Limited examination due to Y. The cardiac silhouette is enlarged 20 by low lung volume Pulmonary vascular unremarkable. Right sided trachea. No focal infiltrates. No Pneumothoraces. LIMITED EXAMINATION DUE TO LOW LUNG VOLUME. RECOMMEND REPEAT EXAM WITH BETTER RESPIRATORY EFFORT.  NO ACUTE ACTIVE CARDIOPULMONARY PROCESS WITHIN THE LIMITS EXAMINATION      EKG: TRACING REVIEWED    RECOMMENDATIONS    Risk Management:  routine:  no special precautions necessary    Medications:  Benzodiazepine works better for akathisia and so a trial with valium 5 mg po bid started  If he shows good response he will be benefiting from propranolol  Will continue to monitor closely  Discussed with his mom about the treatment plan  Discussed with the treating physician/ team about the patient and treatment plan  Reviewed the chart    Discussed with the patient risk, benefit, alternative and common side effects for the  proposed medication treatment. Patient is consenting to the treatment. Thanks for the consult. Please call me if needed.     Electronically signed by Carroll Nino MD on 2/17/2022 at 6:33 PM

## 2022-02-17 NOTE — PROGRESS NOTES
Hospitalist Progress Note      PCP: Cindy Tate    Date of Admission: 2/15/2022    Chief Complaint:    Chief Complaint   Patient presents with    Shortness of Breath     pt c/o trouble breathing     Subjective:  Patient is complaining of hand pain; denies fevers, chills, sweats, SOB. 12 point ROS negative other than mentioned above     Medications:  Reviewed    Infusion Medications    sodium chloride 100 mL/hr at 02/16/22 0802    sodium chloride       Scheduled Medications    benztropine  2 mg Oral BID    trihexyphenidyl  2 mg Oral BID    sodium chloride flush  5-40 mL IntraVENous 2 times per day    enoxaparin  40 mg SubCUTAneous Daily     PRN Meds: sodium chloride flush, sodium chloride, ondansetron **OR** ondansetron, polyethylene glycol, acetaminophen **OR** acetaminophen, LORazepam      Intake/Output Summary (Last 24 hours) at 2/17/2022 1147  Last data filed at 2/17/2022 0121  Gross per 24 hour   Intake --   Output 400 ml   Net -400 ml     Exam:    BP (!) 160/65   Pulse 110   Temp 98.1 °F (36.7 °C) (Oral)   Resp 18   Ht 5' 6\" (1.676 m)   Wt 280 lb (127 kg)   SpO2 100%   BMI 45.19 kg/m²     General appearance: No apparent distress, appears stated age and cooperative. HEENT: Conjunctivae/corneas clear. Neck:  Trachea midline. Respiratory:  Normal respiratory effort. Clear to auscultation  Cardiovascular: Regular rate and rhythm   Abdomen: Soft, non-tender, non-distended with normal bowel sounds. Musculoskeletal: No clubbing, cyanosis or edema bilaterally.   Neuro: Non Focal.   Capillary Refill: Brisk,< 3 seconds   Peripheral Pulses: +2 palpable, equal bilaterally     Labs:   Recent Labs     02/15/22  2230   WBC 7.5   HGB 16.6   HCT 48.3        Recent Labs     02/15/22  2230 02/16/22  0638    137   K 4.0 3.9   CL 97 104   CO2 27 22   BUN 12 13   CREATININE 1.08 0.79   CALCIUM 10.7* 8.6     Recent Labs     02/15/22  2230 02/16/22  0638   AST 38 33   ALT 41 33   BILITOT 1.4* 0.9* ALKPHOS 91 71     No results for input(s): INR in the last 72 hours. Recent Labs     02/15/22  2230 02/16/22  0045 02/16/22  0638 02/16/22  1200 02/16/22  1640   CKTOTAL 1,024*   < > 711* 553* 577*   TROPONINI <0.010  --   --   --   --     < > = values in this interval not displayed. Urinalysis:      Lab Results   Component Value Date    NITRU Negative 02/15/2022    WBCUA 0-2 02/15/2022    BACTERIA Negative 02/15/2022    RBCUA 3-5 02/15/2022    BLOODU Negative 02/15/2022    SPECGRAV 1.044 02/15/2022    GLUCOSEU Negative 02/15/2022     Radiology:  XR CHEST PORTABLE   Final Result   LIMITED EXAMINATION DUE TO LOW LUNG VOLUME. RECOMMEND REPEAT EXAM WITH BETTER RESPIRATORY EFFORT. NO ACUTE ACTIVE CARDIOPULMONARY PROCESS WITHIN THE LIMITS EXAMINATION      CT HEAD WO CONTRAST    (Results Pending)     Assessment/Plan:    Dystonia: Recurrent and worsening painful muscle contractions of the hands bilaterally. Started after stopping Abilify in December 2021. Awaiting psych consult; will also consult neurology for their advise     Elevated CK: Improving; can stop trending.     Elevated lactic acid: Resolved     Hypertension: Continue home meds  ADHD: Continue home meds  Bipolar 1 disorder, depressed, severe: Continue home meds; psych consulted    Active Hospital Problems    Diagnosis Date Noted    Hypertension [I10] 04/04/2012     Priority: Medium    ADHD (attention deficit hyperactivity disorder) [F90.9] 04/04/2012     Priority: Medium    Dystonia [G24.9] 02/16/2022    Mental retardation [F79] 03/08/2018    Bipolar 1 disorder, depressed, severe ([facility] [address] [F31.4] 03/07/2018     Additional work up or/and treatment plan may be added today or then after based on clinical progression. I am managing a portion of pt care. Some medical issues are handled by other specialists. Additional work up and treatment should be done in out pt setting by pt PCP and other out pt providers.      In addition to examining and evaluating pt, I spent additional time explaining care, normal and abnormal findings, and treatment plan. All of pt questions were answered. Counseling, diet and education were  provided. Case will be discussed with nursing staff when appropriate. Family will be updated if and when appropriate. Diet: ADULT DIET;  Regular; 4 carb choices (60 gm/meal)    Code Status: Full Code    lectronically signed by Christie Barros MD on 2/17/2022 at 11:47 AM

## 2022-02-17 NOTE — CONSULTS
Subjective:      Patient ID: Antoinette Lamar is a 32 y.o. male who presents today for dystonia. HPI 32) gentleman with underlying mental dilation was on ability and developed tardive dyskinesia. She was started on Austedo and then taken off this due to the movement disorders and Abilify was tapered off. His symptoms started on October. He has been treated with different other medications and currently takes very low-dose of Trileptal bipolar disorder. Patient is on Cogentin. Disappeared from the mother in much detail he has no history of seizures in the past.  Having seizure episodes of right-sided dystonia which is generalized and therefore he was admitted to the hospital.  Has muscle spasms in his hands. He has elevated CPK levels from the same. At a functional level is functional and he dresses himself feeds himself and takes care of himself. Is not any agitated behaviors. Review of Systems   Constitutional: Negative for fever. HENT: Negative for ear pain, tinnitus and trouble swallowing. Eyes: Negative for photophobia and visual disturbance. Respiratory: Negative for choking and shortness of breath. Cardiovascular: Negative for chest pain and palpitations. Gastrointestinal: Negative for nausea and vomiting. Musculoskeletal: Negative for back pain, gait problem, joint swelling, myalgias, neck pain and neck stiffness. Skin: Negative for color change. Allergic/Immunologic: Negative for food allergies. Neurological: Positive for tremors. Negative for dizziness, seizures, syncope, facial asymmetry, speech difficulty, weakness, light-headedness, numbness and headaches. Psychiatric/Behavioral: Negative for behavioral problems, confusion, hallucinations and sleep disturbance.    Of system was performed though it may not be quite reliable no attempt was made and he understood a few questions and is aware that he has some stiffness but he tells me he has tremors    Past Medical History: Diagnosis Date    ADHD (attention deficit hyperactivity disorder) 4/4/2012    Allergic rhinitis 4/4/2012    Hypertension     Obesity (BMI 30-39.9) 4/4/2012    Sleep apnea      Past Surgical History:   Procedure Laterality Date    TONSILLECTOMY       Social History     Socioeconomic History    Marital status: Single     Spouse name: Not on file    Number of children: Not on file    Years of education: Not on file    Highest education level: Not on file   Occupational History    Not on file   Tobacco Use    Smoking status: Former Smoker     Packs/day: 0.50     Years: 7.00     Pack years: 3.50     Types: Cigarettes     Start date: 11/25/2013     Quit date: 6/1/2018     Years since quitting: 3.7    Smokeless tobacco: Never Used   Substance and Sexual Activity    Alcohol use: Yes     Comment: occ    Drug use: No    Sexual activity: Not on file   Other Topics Concern    Not on file   Social History Narrative    Not on file     Social Determinants of Health     Financial Resource Strain:     Difficulty of Paying Living Expenses: Not on file   Food Insecurity:     Worried About Running Out of Food in the Last Year: Not on file    Jakob of Food in the Last Year: Not on file   Transportation Needs:     Lack of Transportation (Medical): Not on file    Lack of Transportation (Non-Medical):  Not on file   Physical Activity:     Days of Exercise per Week: Not on file    Minutes of Exercise per Session: Not on file   Stress:     Feeling of Stress : Not on file   Social Connections:     Frequency of Communication with Friends and Family: Not on file    Frequency of Social Gatherings with Friends and Family: Not on file    Attends Jainism Services: Not on file    Active Member of Clubs or Organizations: Not on file    Attends Club or Organization Meetings: Not on file    Marital Status: Not on file   Intimate Partner Violence:     Fear of Current or Ex-Partner: Not on file   [employer] Abused: Not on file    Physically Abused: Not on file    Sexually Abused: Not on file   Housing Stability:     Unable to Pay for Housing in the Last Year: Not on file    Number of [place] in the Last Year: Not on file    Unstable Housing in the Last Year: Not on file     Family History   Problem Relation Age of Onset    No Known Problems Mother     No Known Problems Father      No Known Allergies  Current Facility-Administered Medications   Medication Dose Route Frequency Provider Last Rate Last Admin    benztropine (COGENTIN) tablet 2 mg  2 mg Oral BID Ted Ho MD        0.9 % sodium chloride infusion   IntraVENous Continuous Nicoletto Bolzan-Roche, APRN -  mL/hr at 02/16/22 0802 New Bag at 02/16/22 0802    sodium chloride flush 0.9 % injection 5-40 mL  5-40 mL IntraVENous 2 times per day Nicoletto Bolzan-Roche, APRN - CNP   10 mL at 02/16/22 1111    sodium chloride flush 0.9 % injection 5-40 mL  5-40 mL IntraVENous PRN Nicoletto Bolzan-Roche, APRN - CNP        0.9 % sodium chloride infusion  25 mL IntraVENous PRN Nicoletto Bolzan-Roche, APRN - CNP        enoxaparin (LOVENOX) injection 40 mg  40 mg SubCUTAneous Daily Nicoletto Bolzan-Roche, APRN - CNP   40 mg at 02/17/22 0821    ondansetron (ZOFRAN-ODT) disintegrating tablet 4 mg  4 mg Oral Q8H PRN Nicoletto Bolzan-Roche, APRN - CNP        Or    ondansetron (ZOFRAN) injection 4 mg  4 mg IntraVENous Q6H PRN Nicoletto Bolzan-Roche, APRN - CNP   4 mg at 02/16/22 0803    polyethylene glycol (GLYCOLAX) packet 17 g  17 g Oral Daily PRN Nicoletto Bolzan-Roche, APRN - CNP        acetaminophen (TYLENOL) tablet 650 mg  650 mg Oral Q6H PRN Nicoletto Bolzan-Roche, APRN - CNP   650 mg at 02/17/22 0654    Or    acetaminophen (TYLENOL) suppository 650 mg  650 mg Rectal Q6H PRN Nicoletto Bolzan-Roche, APRN - CNP        LORazepam (ATIVAN) injection 2 mg  2 mg IntraVENous Q2H PRN Nicoletto Bolzan-Roche, APRN - CNP   2 mg at 02/17/22 6415 MCV 84.9 02/15/2022    MCH 29.1 02/15/2022    MCHC 34.3 02/15/2022    RDW 13.9 02/15/2022     02/15/2022     Lab Results   Component Value Date     02/16/2022    K 3.9 02/16/2022     02/16/2022    CO2 22 02/16/2022    BUN 13 02/16/2022    CREATININE 0.79 02/16/2022    GFRAA >60.0 02/16/2022    LABGLOM >60.0 02/16/2022    GLUCOSE 106 02/16/2022    PROT 6.9 02/16/2022    LABALBU 3.9 02/16/2022    CALCIUM 8.6 02/16/2022    BILITOT 0.9 02/16/2022    ALKPHOS 71 02/16/2022    AST 33 02/16/2022    ALT 33 02/16/2022     Lab Results   Component Value Date    PROTIME 14.0 01/30/2022    INR 1.1 01/30/2022     Lab Results   Component Value Date    TSH 1.120 01/25/2022    [id number] 684 10/14/2021    FOLATE 18.0 10/14/2021     Lab Results   Component Value Date    TRIG 109 08/03/2020    HDL 58 10/14/2021    LDLCALC 90 10/14/2021     Lab Results   Component Value Date    LABAMPH Neg 02/15/2022    BARBSCNU Neg 02/15/2022    LABBENZ POSITIVE 02/15/2022    LABMETH Neg 02/15/2022    OPIATESCREENURINE Neg 02/15/2022    PHENCYCLIDINESCREENURINE Neg 02/15/2022    ETOH <10 02/15/2022     Lab Results   Component Value Date    VALPROATE 3.9 01/25/2022       Assessment:   Dystonia secondary to previous medication use. Believe he has been tapered off has been on Cogentin and Benadryl for some time. The last week his symptoms have increased. History is obtained from the mother in much detail. At this time recommended that we keep him on Cogentin though at a higher dose with addition of Artane 2 mg twice a day. We will arrange for an EEG to make sure this are not central and also if this continues we may consider other options. Patient has already been tried Austedo. When he does not respond to this we may consider Ingrezza. Patient has not any history of seizure disorder and a functional level history level of function well. Recommended a baseline CT scan to make sure there is no hydrocephalus    Melquiades Desai Laughter, MD, Maximilian Song, American Board of Psychiatry & Neurology  Board Certified in Vascular Neurology  Board Certified in Neuromuscular Medicine  Certified in [facility]:

## 2022-02-17 NOTE — CARE COORDINATION
Messaged & spoke with Dr. Hyun Bailey with mom's concern that trileptal is not order 150 mg qd he reports psych needs to provide guidance with med prescribing. He reports he will be down to reassess if pt can now go to med-surg floor with 1:1 in place of ICU as initially thought. Spoke with Yuriy Aden RN in psych stating psych does not see down in ED. She will help facilitate need for psych input by contacting them. Brooklynn villalba will consult Dr. Allen Gaona.  Electronically signed by Carol Mcnally RN on 2/16/2022 at 7:49 PM

## 2022-02-17 NOTE — CARE COORDINATION
Called and spoke with mother     Promise Rodriguez Case Management Initial Discharge Assessment    Met with MOTHER  Zac hall to discuss discharge plan. PCP: Al Giron                                Date of Last Visit:   DR. Cyn Enamorado [facility])  [facility]) [phone] [facility]) [place]    Discharge Planning    Living Arrangements: independently at home    Who do you live with? MOTHER, FATHER & HIS GRANDMOTHER (MOM HAS PCA KNOWLEDGE)    Who helps you with your care:  self or family    If lives at home:    Patient can perform ADL? Yes AMBULATES INDEPENDENTLY, DRESSES, FEEDS & TOILETS SELF HE      Current Services (outpatient and in home) :  ADULT DAY CARE M-F 9AM-4PM WORKS DOING SMALL TEDIOUS JOBS    Dialysis: No    Is transportation available to get to your appointments? Yes    DME Equipment:  no    Respiratory equipment: None    Respiratory provider:  no     Pharmacy:  yes [facility] [address] with Medication Assistance Program?  No      Patient agreeable to [address]? N/A    Patient agreeable to SNF/Rehab? Declined    Other discharge needs identified? Other TBD PSYCH INPUT    Does Patient Have a High-Risk for Readmission Diagnosis (CHF, PN, MI, COPD)? NO    Initial Discharge Plan? (Note: please see concurrent daily documentation for any updates after initial note).     RETURN HOME ONCE PSYCH INPUT & RECOMMENDATIONS    Readmission Risk              Risk of Unplanned Readmission:  9         Electronically signed by Jenna Ontiveros RN on 2/16/2022 at 7:30 PM

## 2022-02-17 NOTE — PROGRESS NOTES
Physical Therapy Missed Treatment   Facility/Department: [facility] MED SURG [id number]    NAME: Deniz Joshua    : 1995 (32 y.o.)  MRN: [id number]    Account: [de-identified]  Gender: male      PT evaluation and treatment orders received. Chart reviewed. PT evaluation attempted. Pt up to sink with his mother assisting him to get washed up. Nursing staff aware. Will follow and attempt PT evaluation again at earliest availability.        Edna Rich, PT, 22 at 3:32 PM

## 2022-02-18 LAB
ANION GAP SERPL CALCULATED.3IONS-SCNC: 8 MEQ/L (ref 9–15)
BUN BLDV-MCNC: 10 MG/DL (ref 6–20)
CALCIUM SERPL-MCNC: 9.2 MG/DL (ref 8.5–9.9)
CHLORIDE BLD-SCNC: 99 MEQ/L (ref 95–107)
CO2: 28 MEQ/L (ref 20–31)
CREAT SERPL-MCNC: 0.77 MG/DL (ref 0.7–1.2)
GFR AFRICAN AMERICAN: >60
GFR NON-AFRICAN AMERICAN: >60
GLUCOSE BLD-MCNC: 90 MG/DL (ref 70–99)
POTASSIUM REFLEX MAGNESIUM: 4.1 MEQ/L (ref 3.4–4.9)
SODIUM BLD-SCNC: 135 MEQ/L (ref 135–144)

## 2022-02-18 PROCEDURE — 6370000000 HC RX 637 (ALT 250 FOR IP): Performed by: PSYCHIATRY & NEUROLOGY

## 2022-02-18 PROCEDURE — 97162 PT EVAL MOD COMPLEX 30 MIN: CPT

## 2022-02-18 PROCEDURE — 80048 BASIC METABOLIC PNL TOTAL CA: CPT

## 2022-02-18 PROCEDURE — 1210000000 HC MED SURG R&B

## 2022-02-18 PROCEDURE — 6370000000 HC RX 637 (ALT 250 FOR IP): Performed by: NURSE PRACTITIONER

## 2022-02-18 PROCEDURE — 2700000000 HC OXYGEN THERAPY PER DAY

## 2022-02-18 PROCEDURE — 6370000000 HC RX 637 (ALT 250 FOR IP): Performed by: INTERNAL MEDICINE

## 2022-02-18 PROCEDURE — 99232 SBSQ HOSP IP/OBS MODERATE 35: CPT | Performed by: PSYCHIATRY & NEUROLOGY

## 2022-02-18 PROCEDURE — 6360000002 HC RX W HCPCS: Performed by: NURSE PRACTITIONER

## 2022-02-18 PROCEDURE — 2580000003 HC RX 258: Performed by: NURSE PRACTITIONER

## 2022-02-18 PROCEDURE — 99232 SBSQ HOSP IP/OBS MODERATE 35: CPT | Performed by: NURSE PRACTITIONER

## 2022-02-18 PROCEDURE — 97165 OT EVAL LOW COMPLEX 30 MIN: CPT

## 2022-02-18 PROCEDURE — 36415 COLL VENOUS BLD VENIPUNCTURE: CPT

## 2022-02-18 RX ORDER — LORAZEPAM 1 MG/1
2 TABLET ORAL
Status: DISCONTINUED | OUTPATIENT
Start: 2022-02-18 | End: 2022-02-21 | Stop reason: HOSPADM

## 2022-02-18 RX ORDER — AMLODIPINE BESYLATE 10 MG/1
10 TABLET ORAL DAILY
Status: DISCONTINUED | OUTPATIENT
Start: 2022-02-18 | End: 2022-02-21 | Stop reason: HOSPADM

## 2022-02-18 RX ORDER — DIAZEPAM 2 MG/1
2 TABLET ORAL 2 TIMES DAILY
Status: DISCONTINUED | OUTPATIENT
Start: 2022-02-18 | End: 2022-02-21

## 2022-02-18 RX ORDER — OXCARBAZEPINE 150 MG/1
150 TABLET, FILM COATED ORAL DAILY
Status: DISCONTINUED | OUTPATIENT
Start: 2022-02-18 | End: 2022-02-21

## 2022-02-18 RX ORDER — LOSARTAN POTASSIUM 100 MG/1
100 TABLET ORAL DAILY
Status: DISCONTINUED | OUTPATIENT
Start: 2022-02-18 | End: 2022-02-21 | Stop reason: HOSPADM

## 2022-02-18 RX ORDER — HYDROCHLOROTHIAZIDE 25 MG/1
25 TABLET ORAL EVERY MORNING
Status: DISCONTINUED | OUTPATIENT
Start: 2022-02-18 | End: 2022-02-21 | Stop reason: HOSPADM

## 2022-02-18 RX ADMIN — ENOXAPARIN SODIUM 40 MG: 100 INJECTION SUBCUTANEOUS at 09:00

## 2022-02-18 RX ADMIN — LOSARTAN POTASSIUM 100 MG: 100 TABLET, FILM COATED ORAL at 09:00

## 2022-02-18 RX ADMIN — Medication 650 MG: at 23:56

## 2022-02-18 RX ADMIN — HYDROCHLOROTHIAZIDE 25 MG: 25 TABLET ORAL at 09:01

## 2022-02-18 RX ADMIN — OXCARBAZEPINE 150 MG: 150 TABLET, FILM COATED ORAL at 09:00

## 2022-02-18 RX ADMIN — TRIHEXYPHENIDYL HYDROCHLORIDE 2 MG: 2 TABLET ORAL at 21:06

## 2022-02-18 RX ADMIN — Medication 10 ML: at 00:40

## 2022-02-18 RX ADMIN — LORAZEPAM 2 MG: 1 TABLET ORAL at 23:35

## 2022-02-18 RX ADMIN — TRIHEXYPHENIDYL HYDROCHLORIDE 2 MG: 2 TABLET ORAL at 09:00

## 2022-02-18 RX ADMIN — LORAZEPAM 2 MG: 1 TABLET ORAL at 18:12

## 2022-02-18 RX ADMIN — LORAZEPAM 2 MG: 2 INJECTION INTRAMUSCULAR; INTRAVENOUS at 04:32

## 2022-02-18 RX ADMIN — BENZTROPINE MESYLATE 2 MG: 1 TABLET ORAL at 21:06

## 2022-02-18 RX ADMIN — BENZTROPINE MESYLATE 2 MG: 1 TABLET ORAL at 09:00

## 2022-02-18 RX ADMIN — DIAZEPAM 5 MG: 5 TABLET ORAL at 09:00

## 2022-02-18 RX ADMIN — AMLODIPINE BESYLATE 10 MG: 10 TABLET ORAL at 09:00

## 2022-02-18 RX ADMIN — DIAZEPAM 2 MG: 2 TABLET ORAL at 21:06

## 2022-02-18 ASSESSMENT — ENCOUNTER SYMPTOMS
COUGH: 0
ABDOMINAL DISTENTION: 0
WHEEZING: 0
COLOR CHANGE: 0
CHEST TIGHTNESS: 0
VOMITING: 0
TROUBLE SWALLOWING: 0

## 2022-02-18 ASSESSMENT — PAIN SCALES - GENERAL
PAINLEVEL_OUTOF10: 0
PAINLEVEL_OUTOF10: 7
PAINLEVEL_OUTOF10: 7

## 2022-02-18 NOTE — PROGRESS NOTES
Hospitalist Progress Note      PCP: Leslie Escalante    Date of Admission: 2/15/2022    Chief Complaint:    Chief Complaint   Patient presents with    Shortness of Breath     pt c/o trouble breathing     Subjective:  Patient is complaining of hand pain but that it is improving; denies fevers, chills, sweats, SOB. 12 point ROS negative other than mentioned above     Medications:  Reviewed    Infusion Medications    sodium chloride 100 mL/hr at 02/17/22 2056    sodium chloride       Scheduled Medications    amLODIPine  10 mg Oral Daily    hydroCHLOROthiazide  25 mg Oral QAM    losartan  100 mg Oral Daily    OXcarbazepine  150 mg Oral Daily    benztropine  2 mg Oral BID    trihexyphenidyl  2 mg Oral BID    diazePAM  5 mg Oral BID    sodium chloride flush  5-40 mL IntraVENous 2 times per day    enoxaparin  40 mg SubCUTAneous Daily     PRN Meds: sodium chloride flush, sodium chloride, ondansetron **OR** ondansetron, polyethylene glycol, acetaminophen **OR** acetaminophen, LORazepam      Intake/Output Summary (Last 24 hours) at 2/18/2022 1234  Last data filed at 2/18/2022 0433  Gross per 24 hour   Intake --   Output 1265 ml   Net -1265 ml     Exam:    BP (!) 156/84   Pulse 90   Temp 98 °F (36.7 °C) (Oral)   Resp 16   Ht 5' 6\" (1.676 m)   Wt 267 lb (121.1 kg)   SpO2 98%   BMI 43.09 kg/m²     General appearance: No apparent distress, appears stated age and cooperative. HEENT: Conjunctivae/corneas clear. Neck:  Trachea midline. Respiratory:  Normal respiratory effort. Clear to auscultation  Cardiovascular: Regular rate and rhythm   Abdomen: Soft, non-tender, non-distended with normal bowel sounds. Musculoskeletal: No clubbing, cyanosis or edema bilaterally.   Neuro: Non Focal.   Capillary Refill: Brisk,< 3 seconds   Peripheral Pulses: +2 palpable, equal bilaterally     Labs:   Recent Labs     02/15/22  2230   WBC 7.5   HGB 16.6   HCT 48.3        Recent Labs     02/15/22  2230 02/16/22  0446 02/18/22  0804    137 135   K 4.0 3.9 4.1   CL 97 104 99   CO2 27 22 28   BUN 12 13 10   CREATININE 1.08 0.79 0.77   CALCIUM 10.7* 8.6 9.2     Recent Labs     02/15/22  2230 02/16/22  0638   AST 38 33   ALT 41 33   BILITOT 1.4* 0.9*   ALKPHOS 91 71     No results for input(s): INR in the last 72 hours. Recent Labs     02/15/22  2230 02/16/22  0045 02/16/22  0638 02/16/22  1200 02/16/22  1640   CKTOTAL 1,024*   < > 711* 553* 577*   TROPONINI <0.010  --   --   --   --     < > = values in this interval not displayed. Urinalysis:      Lab Results   Component Value Date    NITRU Negative 02/15/2022    WBCUA 0-2 02/15/2022    BACTERIA Negative 02/15/2022    RBCUA 3-5 02/15/2022    BLOODU Negative 02/15/2022    SPECGRAV 1.044 02/15/2022    GLUCOSEU Negative 02/15/2022     Radiology:  CT HEAD WO CONTRAST   Final Result   Impression:      Bilateral maxillary sinusitis with bilateral maxillary retention cysts, greater on left. All CT scans at this facility use dose modulation, iterative reconstruction, and/or weight based dosing when appropriate to reduce radiation dose to as low as reasonably achievable. XR CHEST PORTABLE   Final Result   LIMITED EXAMINATION DUE TO LOW LUNG VOLUME. RECOMMEND REPEAT EXAM WITH BETTER RESPIRATORY EFFORT. NO ACUTE ACTIVE CARDIOPULMONARY PROCESS WITHIN THE LIMITS EXAMINATION        Assessment/Plan:    Dystonia: Recurrent and worsening painful muscle contractions of the hands bilaterally. Started after stopping Abilify in December 2021.   Psych and neurology are managing; doing much better today     Elevated CK: Improving; can stop trending.     Elevated lactic acid: Resolved     Hypertension: Continue home meds  ADHD: Continue home meds  Bipolar 1 disorder, depressed, severe: Continue home meds; psych consulted and are assisting    Active Hospital Problems    Diagnosis Date Noted    Hypertension [I10] 04/04/2012     Priority: Medium    ADHD (attention deficit hyperactivity disorder) [F90.9] 04/04/2012     Priority: Medium    Dystonia [G24.9] 02/16/2022    Mental retardation [F79] 03/08/2018    Bipolar 1 disorder, depressed, severe ([facility] [F31.4] 03/07/2018     Additional work up or/and treatment plan may be added today or then after based on clinical progression. I am managing a portion of pt care. Some medical issues are handled by other specialists. Additional work up and treatment should be done in out pt setting by pt PCP and other out pt providers. In addition to examining and evaluating pt, I spent additional time explaining care, normal and abnormal findings, and treatment plan. All of pt questions were answered. Counseling, diet and education were  provided. Case will be discussed with nursing staff when appropriate. Family will be updated if and when appropriate. Diet: ADULT DIET;  Regular; 4 carb choices (60 gm/meal)    Code Status: Full Code     Possible d/c today or tomorrow depending on psych and neuro    lectronically signed by Fernando Rowland MD on 2/18/2022 at 12:34 PM

## 2022-02-18 NOTE — PROGRESS NOTES
No  Ambulation Assistance: Independent (no AD)  Transfer Assistance: Independent  Active : No    OBJECTIVE:   Vision: Within Functional Limits  Hearing: Within functional limits    Cognition:  Overall Orientation Status: Impaired  Orientation Level: Oriented to place,Oriented to situation,Oriented to person  Follows Commands: Within Functional Limits    Observation/Palpation  Observation: sleeping upon arrival, wakes with minimal effort, pleasant, cooperative, no acute distress noted    ROM:  RLE AROM: WFL  LLE AROM : WFL    Strength:  Strength RLE  Strength RLE: WFL  Strength LLE  Strength LLE: WFL    Neuro:  Balance  Posture: Good  Sitting - Static: Good  Sitting - Dynamic: Good  Standing - Static: Good  Standing - Dynamic: Good     Tone RLE  RLE Tone: Normotonic  Tone LLE  LLE Tone: Normotonic  Motor Control  Gross Motor?: WFL  Sensation  Overall Sensation Status: WFL    Bed mobility  Supine to Sit: Independent  Sit to Supine: Independent    Transfers  Sit to Stand: Independent  Stand to sit: Independent    Ambulation  Ambulation?: Yes  Ambulation 1  Surface: level tile  Device: No Device  Assistance: Independent  Gait Deviations: Slow Essence;Decreased step length  Distance: 100ft  Comments: navigating back and forth inroom performing functional tasks-bathroom, washing at sink and upto bedside chair in room, no LOB noted, good safety awareness    Activity Tolerance  Activity Tolerance: Patient Tolerated treatment well      PT Education  PT Education: PT Role    ASSESSMENT:   Decision Making: Low Complexity  History: med  Exam: low  Clinical Presentation: low    Prognosis: Good  Barriers to Learning: none    DISCHARGE RECOMMENDATIONS:  Discharge Recommendations: Continue to assess pending progress    Assessment: Pt demonstrates indep with alll functional mobility including ambulation in room without AD.  Pt demonstrates steady gait and performs various functional tasks during PT eval including toileting and washing at sink. Pt demonstrates no acute PT needs  REQUIRES PT FOLLOW UP: No      PLAN OF CARE:  Plan  Times per week: eval only  Safety Devices  Type of devices: Left in chair,Call light within reach,Nurse notified    Goals:  Patient goals : to go home    [facility] (6 CLICK) Yara [address] Inpatient Mobility Raw Score : 24     Therapy Time:   Individual   Time In 1208   Time Out 1146   Minutes 100 [place], [place], 02/18/22 at 11:53 AM     Definitions for assistance levels  Independent = pt does not require any physical supervision or assistance from another person for activity completion. Device may be needed.   Stand by assistance = pt requires verbal cues or instructions from another person, close to but not touching, to perform the activity  Minimal assistance= pt performs 75% or more of the activity; assistance is required to complete the activity  Moderate assistance= pt performs 50% of the activity; assistance is required to complete the activity  Maximal assistance = pt performs 25% of the activity; assistance is required to complete the activity  Dependent = pt requires total physical assistance to accomplish the task

## 2022-02-18 NOTE — PROGRESS NOTES
[facility] FOLLOW-UP NOTE       2/18/2022     Patient was seen and examined in person, Chart reviewed   Patient's case discussed with staff/team    Chief Complaint: Involuntary movement    Interim History:     Pt appeared less restless and fidgety  Was sedated this morning  Mild perio oral movement present but less intense  Pt denies any mood symptoms  No active SI or HI  No agitation  Appetite:   [x] Normal/Unchanged  [] Increased  [] Decreased      Sleep:       [x] Normal/Unchanged  [] Fair       [] Poor              Energy:    [x] Normal/Unchanged  [] Increased  [] Decreased        SI [] Present  [x] Absent    HI  []Present  [x] Absent     Aggression:  [] yes  [] no    Patient is [] able  [] unable to CONTRACT FOR SAFETY     PAST MEDICAL/PSYCHIATRIC HISTORY:   Past Medical History:   Diagnosis Date    ADHD (attention deficit hyperactivity disorder) 4/4/2012    Allergic rhinitis 4/4/2012    Hypertension     Obesity (BMI 30-39.9) 4/4/2012    Sleep apnea        FAMILY/SOCIAL HISTORY:  Family History   Problem Relation Age of Onset    No Known Problems Mother     No Known Problems Father      Social History     Socioeconomic History    Marital status: Single     Spouse name: Not on file    Number of children: Not on file    Years of education: Not on file    Highest education level: Not on file   Occupational History    Not on file   Tobacco Use    Smoking status: Former Smoker     Packs/day: 0.50     Years: 7.00     Pack years: 3.50     Types: Cigarettes     Start date: 11/25/2013     Quit date: 6/1/2018     Years since quitting: 3.7    Smokeless tobacco: Never Used   Substance and Sexual Activity    Alcohol use: Yes     Comment: occ    Drug use: No    Sexual activity: Not on file   Other Topics Concern    Not on file   Social History Narrative    Not on file     Social Determinants of Health     Financial Resource Strain:     Difficulty of Paying Living Expenses: Not on file   Food Insecurity:     Worried About Running Out of Food in the Last Year: Not on file    Jakob of Food in the Last Year: Not on file   Transportation Needs:     Lack of Transportation (Medical): Not on file    Lack of Transportation (Non-Medical): Not on file   Physical Activity:     Days of Exercise per Week: Not on file    Minutes of Exercise per Session: Not on file   Stress:     Feeling of Stress : Not on file   Social Connections:     Frequency of Communication with Friends and Family: Not on file    Frequency of Social Gatherings with Friends and Family: Not on file    Attends Cheondoism Services: Not on file    Active Member of [address] [place] [address] [place] or Organizations: Not on file    Attends Club or Organization Meetings: Not on file    Marital Status: Not on file   Intimate Partner Violence:     Fear of Current or Ex-Partner: Not on file    Emotionally Abused: Not on file    Physically Abused: Not on file    Sexually Abused: Not on file   Housing Stability:     Unable to Pay for Housing in the Last Year: Not on file    Number of [place] in the Last Year: Not on file    Unstable Housing in the Last Year: Not on file           ROS:  [x] All negative/unchanged except if checked.  Explain positive(checked items) below:  [] Constitutional  [] Eyes  [] Ear/Nose/Mouth/Throat  [] Respiratory  [] CV  [] GI  []   [] Musculoskeletal  [] Skin/Breast  [] Neurological  [] Endocrine  [] Heme/Lymph  [] Allergic/Immunologic    Explanation:     MEDICATIONS:    Current Facility-Administered Medications:     amLODIPine (NORVASC) tablet 10 mg, 10 mg, Oral, Daily, COLT Suazo - NP, 10 mg at 02/18/22 0900    hydroCHLOROthiazide (HYDRODIURIL) tablet 25 mg, 25 mg, Oral, QAM, COLT Suazo - NP, 25 mg at 02/18/22 0901    losartan (COZAAR) tablet 100 mg, 100 mg, Oral, Daily, COLT Suazo - NP, 100 mg at 02/18/22 0900    OXcarbazepine (TRILEPTAL) tablet 150 mg, 150 mg, Oral, Daily, COLT Triplett NP, 150 mg at 02/18/22 0900    benztropine (COGENTIN) tablet 2 mg, 2 mg, Oral, BID, Nicolas Brownlee MD, 2 mg at 02/18/22 0900    trihexyphenidyl (ARTANE) tablet 2 mg, 2 mg, Oral, BID, Nicolas Brownlee MD, 2 mg at 02/18/22 0900    diazePAM (VALIUM) tablet 5 mg, 5 mg, Oral, BID, Cristina Doss MD, 5 mg at 02/18/22 0900    0.9 % sodium chloride infusion, , IntraVENous, Continuous, Nicoletto Bolzan-Roche, APRN - CNP, Last Rate: 100 mL/hr at 02/17/22 2056, New Bag at 02/17/22 2056    sodium chloride flush 0.9 % injection 5-40 mL, 5-40 mL, IntraVENous, 2 times per day, Nicoletto Bolzan-Roche, APRN - CNP, 10 mL at 02/18/22 0040    sodium chloride flush 0.9 % injection 5-40 mL, 5-40 mL, IntraVENous, PRN, Nicoletto Bolzan-Roche, APRN - CNP    0.9 % sodium chloride infusion, 25 mL, IntraVENous, PRN, Nicoletto Bolzan-Roche, APRN - CNP    enoxaparin (LOVENOX) injection 40 mg, 40 mg, SubCUTAneous, Daily, Nicoletto Bolzan-Roche, APRN - CNP, 40 mg at 02/18/22 0900    ondansetron (ZOFRAN-ODT) disintegrating tablet 4 mg, 4 mg, Oral, Q8H PRN **OR** ondansetron (ZOFRAN) injection 4 mg, 4 mg, IntraVENous, Q6H PRN, Nicoletto Bolzan-Roche, APRN - CNP, 4 mg at 02/16/22 0803    polyethylene glycol (GLYCOLAX) packet 17 g, 17 g, Oral, Daily PRN, Nicoletto Bolzan-Roche, APRN - CNP    acetaminophen (TYLENOL) tablet 650 mg, 650 mg, Oral, Q6H PRN, 650 mg at 02/17/22 0654 **OR** acetaminophen (TYLENOL) suppository 650 mg, 650 mg, Rectal, Q6H PRN, Nicoletto Bolzan-Roche, APRN - CNP    LORazepam (ATIVAN) injection 2 mg, 2 mg, IntraVENous, Q2H PRN, Nicoletto Bolzan-Roche, APRN - CNP, 2 mg at 02/18/22 0432      Examination:  BP (!) 156/84   Pulse 90   Temp 98 °F (36.7 °C) (Oral)   Resp 16   Ht 5' 6\" (1.676 m)   Wt 267 lb (121.1 kg)   SpO2 98%   BMI 43.09 kg/m²   Gait - steady  Medication side effects(SE): no    Mental Status Examination:    Level of consciousness:  within normal limits Appearance:  fair grooming and fair hygiene  Behavior/Motor:  no abnormalities noted  Attitude toward examiner:  cooperative  Speech:  normal rate   Mood: anxious  Affect:  mood congruent  Thought processes:  goal directed   Thought content:  Suicidal Ideation:  denies suicidal ideation  Cognition:  oriented to person, place, and time   Concentration intact  Insight fair   Judgement fair     ASSESSMENT:   Patient symptoms are:  [] Well controlled  [] Improving  [] Worsening  [] No change      Diagnosis:   Tardive Akathisia  Active Problems:    Hypertension    ADHD (attention deficit hyperactivity disorder)    Bipolar 1 disorder, depressed, severe ([facility]    Mental retardation    Dystonia  Resolved Problems:    * No resolved hospital problems. *      LABS:    Recent Labs     02/15/22  2230   WBC 7.5   HGB 16.6        Recent Labs     02/15/22  2230 02/16/22  0638 02/18/22  0804    137 135   K 4.0 3.9 4.1   CL 97 104 99   CO2 27 22 28   BUN 12 13 10   CREATININE 1.08 0.79 0.77   GLUCOSE 148* 106* 90     Recent Labs     02/15/22  2230 02/16/22  0638   BILITOT 1.4* 0.9*   ALKPHOS 91 71   AST 38 33   ALT 41 33     Lab Results   Component Value Date    LABAMPH Neg 02/15/2022    BARBSCNU Neg 02/15/2022    LABBENZ POSITIVE 02/15/2022    LABMETH Neg 02/15/2022    OPIATESCREENURINE Neg 02/15/2022    PHENCYCLIDINESCREENURINE Neg 02/15/2022    ETOH <10 02/15/2022     Lab Results   Component Value Date    TSH 1.120 01/25/2022     No results found for: LITHIUM  Lab Results   Component Value Date    VALPROATE 3.9 (L) 01/25/2022       Treatment Plan:  Reviewed current Medications with the patient. Since patient appeared drowsy, will recommend decrease in valium to 2 mg po bid. Continue trileptal  Pt is not suicidal or agitated  Risks, benefits, side effects, drug-to-drug interactions and alternatives to treatment were discussed.       Electronically signed by Jennifer Bush MD on 2/18/2022 at 5:26 PM

## 2022-02-18 NOTE — PROGRESS NOTES
[facility] Neurology Daily Progress Note  Name: Tammy Palacios  Age: 32 y.o. Gender: male  CodeStatus: Full Code  Allergies: No Known Allergies    Chief Complaint:Shortness of Breath (pt c/o trouble breathing)    Primary Care Provider: Guevara Chavez  InpatientTreatment Team: Treatment Team: Attending Provider: Damian Trevino MD; Consulting Physician: Tayler Sevilla MD; Consulting Physician: Damian Trevino MD; Utilization Reviewer: Chon Modi RN; Consulting Physician: Annie Ross MD; Registered Nurse: Cora Corbett RN; Registered Nurse: Beba Edmonds RN  Admission Date: 2/15/2022      HPI   Pt seen and examined for neuro follow up for tardive dyskinesia and dystonia. Patient currently somnolent and barely awakens for exam.  Not cooperative. Discussed with nursing who reports he will wake up in be appropriate and then fall asleep again. No abnormal jerking movements noted today. Still with lip smacking and tongue protrusion. Vitals:    02/18/22 0858   BP: (!) 156/84   Pulse: 90   Resp: 16   Temp: 98 °F (36.7 °C)   SpO2:       Review of Systems   Unable to perform ROS: Other   Constitutional: Positive for fatigue. Negative for fever. HENT: Negative for hearing loss and trouble swallowing. Respiratory: Negative for cough, chest tightness and wheezing. Cardiovascular: Negative for leg swelling. Gastrointestinal: Negative for abdominal distention and vomiting. Genitourinary: Negative for difficulty urinating. Skin: Negative for color change and rash. Neurological: Negative for dizziness, tremors, seizures, syncope, facial asymmetry, speech difficulty and weakness. Psychiatric/Behavioral: Positive for behavioral problems. Negative for agitation and hallucinations. The patient is not nervous/anxious. Physical Exam  Vitals and nursing note reviewed. Constitutional:       General: He is not in acute distress. Appearance: He is obese. He is not diaphoretic.    HENT:      Head: Normocephalic and atraumatic. Eyes:      Pupils: Pupils are equal, round, and reactive to light. Cardiovascular:      Rate and Rhythm: Normal rate and regular rhythm. Pulmonary:      Effort: Pulmonary effort is normal. No respiratory distress. Breath sounds: Normal breath sounds. Abdominal:      General: Bowel sounds are normal. There is no distension. Palpations: Abdomen is soft. Skin:     General: Skin is warm and dry. Neurological:      Mental Status: He is alert. Comments: Neuro exam limited as patient is very somnolent and will not wake up long enough to participate with neuro exam.  No obvious involuntary jerking or twitching noted currently. Nursing reports he has had intermittent lipsmacking and protrusion of the tongue. Medications:  Reviewed    Infusion Medications:    sodium chloride 100 mL/hr at 02/17/22 2056    sodium chloride       Scheduled Medications:    amLODIPine  10 mg Oral Daily    hydroCHLOROthiazide  25 mg Oral QAM    losartan  100 mg Oral Daily    OXcarbazepine  150 mg Oral Daily    benztropine  2 mg Oral BID    trihexyphenidyl  2 mg Oral BID    diazePAM  5 mg Oral BID    sodium chloride flush  5-40 mL IntraVENous 2 times per day    enoxaparin  40 mg SubCUTAneous Daily     PRN Meds: sodium chloride flush, sodium chloride, ondansetron **OR** ondansetron, polyethylene glycol, acetaminophen **OR** acetaminophen, LORazepam    Labs:   Recent Labs     02/15/22  2230   WBC 7.5   HGB 16.6   HCT 48.3        Recent Labs     02/15/22  2230 02/16/22  0638 02/18/22  0804    137 135   K 4.0 3.9 4.1   CL 97 104 99   CO2 27 22 28   BUN 12 13 10   CREATININE 1.08 0.79 0.77   CALCIUM 10.7* 8.6 9.2     Recent Labs     02/15/22  2230 02/16/22  0638   AST 38 33   ALT 41 33   BILITOT 1.4* 0.9*   ALKPHOS 91 71     No results for input(s): INR in the last 72 hours.   Recent Labs     02/15/22  2230 02/16/22  0045 02/16/22  9698 02/16/22  1200 02/16/22  1640   CKTOTAL 1,024*   < > 711* 553* 577*   TROPONINI <0.010  --   --   --   --     < > = values in this interval not displayed. Urinalysis:   Lab Results   Component Value Date    NITRU Negative 02/15/2022    WBCUA 0-2 02/15/2022    BACTERIA Negative 02/15/2022    RBCUA 3-5 02/15/2022    BLOODU Negative 02/15/2022    SPECGRAV 1.044 02/15/2022    GLUCOSEU Negative 02/15/2022       Radiology:   Most recent    EEG No valid procedures specified. MRI of Brain No results found for this or any previous visit. No results found for this or any previous visit. MRA of the Head and Neck: No results found for this or any previous visit. No results found for this or any previous visit. No results found for this or any previous visit. CT of the Head: Results for orders placed during the hospital encounter of 02/15/22    [address]  CT Brain. Contrast medium:  without contrast.. History:  Dystonia. Technical factors: CT imaging of the brain was obtained and formatted as 5 mm contiguous axial images. 2.5 mm contiguous axial images were obtained through the osseous structures. Sagittal and coronal reconstruction obtained during postprocessing. Comparison:  None. Findings:    Extra-axial spaces:  Normal.    Intracranial hemorrhage:  None. Ventricular system: [Negative. Basal Cisterns:  Without anomaly. Cerebral Parenchyma: Without anomaly. Midline Shift:  None. Cerebellum:  No anomaly identified. Paranasal sinuses and mastoid air cells:  Bilateral maxillary mucosal thickening with rounded soft tissue attenuation, bilateral maxillary sinuses, greater on left. Visualized Orbits:  Negative. Impression  Impression:    Bilateral maxillary sinusitis with bilateral maxillary retention cysts, greater on left.       All CT scans at this facility use dose modulation, iterative reconstruction, and/or weight based dosing when appropriate to reduce radiation dose to as low as reasonably achievable. No results found for this or any previous visit. No results found for this or any previous visit. Carotid duplex: No results found for this or any previous visit. No results found for this or any previous visit. No results found for this or any previous visit. Echo No results found for this or any previous visit. Assessment/Plan:    Dystonia secondary to previous medication use. Believe he has been tapered off has been on Cogentin and Benadryl for some time. The last week his symptoms have increased. History is obtained from the mother in much detail. At this time recommended that we keep him on Cogentin though at a higher dose with addition of Artane 2 mg twice a day. We will arrange for an EEG to make sure this are not central and also if this continues we may consider other options. Patient has already been tried Austedo. When he does not respond to this we may consider Ingrezza. Patient has not any history of seizure disorder and a functional level history level of function well. Recommended a baseline CT scan to make sure there is no hydrocephalus    2/18/22:  CT the head negative for NPH or acute findings  EEG completed and essentially normal.  CK levels mildly elevated at 577 which is improved since admission. But it appears that his CK levels have been elevated on previous admissions as well. 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 Abilify has been discontinued. TSH was normal at 1.120 on 1/25/2022. Will check aldolase and myositis panel.   Collaborating physicians: Dr Cat Enter    Electronically signed by COLT Sandoval CNP on 2/18/2022 at 2:43 PM

## 2022-02-18 NOTE — PROCEDURES
[address]                      [address], [address] [facility]                          ELECTROENCEPHALOGRAM REPORT    PATIENT NAME: Jessika Phillips                    :        1995  MED REC NO:   [id number]                            ROOM:       Z846  ACCOUNT NO:   [de-identified]                           ADMIT DATE: 02/15/2022  PROVIDER:     Faye Torres MD    DATE OF EE2022    MEDICATIONS:  Cogentin, _____, Lovenox. EEG FINDINGS:  This is a spontaneous 21-channel recording for this  patient with dystonia. The background rhythm of this EEG shows  excessive beta artifacts seen throughout the EEG recording secondary to  benzodiazepines. The record otherwise remains symmetrical.  Alpha  rhythm cannot be ascertained with this though amplitudes remains slow. There is no suggestion of epileptiform discharge or any other  asymmetries to suggest any focal seizure disorder. Photic stimulation  performed without any photic responses. The record remains symmetrical  with EKG artifacts. IMPRESSION:  This is an essentially normal EEG recording except for some  significant beta artifacts seen throughout the EEG recording secondary  to benzodiazepines. There is no suggestion of asymmetries or  epileptiform discharge. Clinical course recommended.         Felix Noel MD    D: 2022 17:58:25       T: 2022 18:00:42     [email or web address]  Job#: [id number]     Doc#: [id number]    CC:

## 2022-02-18 NOTE — CARE COORDINATION
Daily Update    From: Home with family. PMH: Tardive dyskinesia, MRDD. Anticipated Discharge Date: TBD    Anticipated Discharge Disposition: Home with family + adult day care M-F. Patient Mobility or PT/OT ordered: Yes    Consults: Psych, neuro. Covid Test Date and Result: NA - Not tested. Barriers to Discharge:   - Per neuro, patient Debra Vera", is \"not cooperative\", still w/ TD symptoms.  - Continues to have symptoms of dystonia. --- CMI done.     Readmission Risk              Risk of Unplanned Readmission:  [address], RN  February 18, 2022

## 2022-02-18 NOTE — PROGRESS NOTES
[facility] OCCUPATIONAL THERAPY EVALUATION - ACUTE     NAME: Miryam Kaufman  : 1995 (32 y.o.)  MRN: [id number]  CODE STATUS: Full Code  Room: E783/G645-72    Date of Service: 2022    Patient Diagnosis(es): Dystonia [G24.9]   Chief Complaint   Patient presents with    Shortness of Breath     pt c/o trouble breathing     Patient Active Problem List    Diagnosis Date Noted    Allergic rhinitis 2012    Hypertension 2012    Obesity (BMI 30-39.9) 2012    ADHD (attention deficit hyperactivity disorder) 2012    Dystonia 2022    CHRISTOPHER (obstructive sleep apnea)     Morbid obesity ([facility] [address] 2018    Mental retardation 2018    [facility] 2018        Past Medical History:   Diagnosis Date    ADHD (attention deficit hyperactivity disorder) 2012    Allergic rhinitis 2012    Hypertension     Obesity (BMI 30-39.9) 2012    Sleep apnea      Past Surgical History:   Procedure Laterality Date    TONSILLECTOMY          Restrictions  Restrictions/Precautions: Fall Risk (medium fall risk per Bolanos score)     Safety Devices: Safety Devices  Safety Devices in place: Yes  Type of devices:  All fall risk precautions in place,Left in bed,Call light within reach        Subjective  Pre Treatment Pain Screening  Pain at present: 7  Scale Used: Numeric Score  Intervention List: Patient able to continue with treatment    Pain Reassessment:   Pain Assessment  Patient Currently in Pain: Yes  Pain Level: 7       Prior Level of Function:  Social/Functional History  Lives With: Family  Type of Home: House  Home Layout: Two level (10 steps with handrail to basement level where his bedroom is)  Home Access: Stairs to enter with rails  Entrance Stairs - Number of Steps: 4  Bathroom Shower/Tub: Tub/Shower unit  Bathroom Toilet: Standard  ADL Assistance: Independent  Homemaking Assistance: Independent  Homemaking Responsibilities: No  Ambulation Assistance: Independent (no AD)  Transfer Assistance: Independent  Active : No  Patient's  Info: Mom, grandma, girlfriend  Type of occupation: Not working    OBJECTIVE:     Orientation Status:  Orientation  Overall Orientation Status: Within Functional Limits    Observation:  Observation/Palpation  Observation: pt lying in bed on diagonal, pt pleasant and cooperative. Cognition Status:  Cognition  Overall Cognitive Status: WFL    Perception Status:  Perception  Overall Perceptual Status: WFL    Sensation Status:  Sensation  Overall Sensation Status: WFL    Vision and Hearing Status:  Vision  Vision: Within Functional Limits  Hearing  Hearing: Within functional limits     ROM:   LUE AROM (degrees)  LUE AROM : WFL  Left Hand AROM (degrees)  Left Hand AROM: WFL  RUE AROM (degrees)  RUE AROM : WFL  Right Hand AROM (degrees)  Right Hand AROM: WFL    Strength:  LUE Strength  Gross LUE Strength: Exceptions to [facility]  L Hand General: 3/5  RUE Strength  Gross RUE Strength: Exceptions to [facility]  R Hand General: 3/5    Coordination, Tone, Quality of Movement: Tone RUE  RUE Tone: Normotonic  Tone LUE  LUE Tone: Normotonic  Coordination  Movements Are Fluid And Coordinated: No  Coordination and Movement description: Decreased speed,Right UE,Left UE,Decreased accuracy,Tremors,Fine motor impairments    Hand Dominance:  Hand Dominance  Hand Dominance: Right    ADL Status:  ADL  Feeding: Setup  Grooming: Stand by assistance  UE Bathing: Stand by assistance  LE Bathing: Stand by assistance  UE Dressing: Stand by assistance  LE Dressing: Stand by assistance  Toileting: Stand by assistance  Additional Comments: Simulated ADL scores d/t patient not wearing a shirt, and no socks. Pt expressed not being able to open containers. Toilet Transfers  Toilet Transfer: Supervision  Toilet Transfers Comments: Simulated score d/t patient Supervision from EOB.        Therapy key for assistance levels -   Independent = Pt. is able to help for putting on and taking off regular upper body clothing?: None  How much help for taking care of personal grooming?: A Little  How much help for eating meals?: A Little  AM-PAC Inpatient Daily Activity Raw Score: 21  AM-PAC Inpatient ADL T-Scale Score : 44.27  ADL Inpatient CMS 0-100% Score: 32.79    Plan:  Plan  Times per week: 1-4x  Plan weeks: 1-4 times  Current Treatment Recommendations: Self-Care / ADL,Strengthening    Goals:   Patient will:    - Improve functional endurance to tolerate/complete 20 mins of ADL's  - Improve B UE hand fine motor coordination to Good in order to manage clothing fasteners/self-care containers in a timely manner  - Improve B UE Function (AROM, strength, motor control, tone normalization) to complete ADLs as projected. - Improve B UE strength and endurance to 5/5 in order to participate in self-care activities as projected. Patient Goal: Patient goals : \"i just want to be able to open things. \"    Discussed and agreed upon: Yes Comments:     Therapy Time:   OT Individual Minutes  Time In: 9352  Time Out: 0919  Minutes: 8    Eval: 8 minutes     Electronically signed by:    Aixa Cai OT, OTR/L  2/18/2022, 2:44 PM

## 2022-02-18 NOTE — FLOWSHEET NOTE
Pt lost iv access, orders recd to change iv ativan to po, pt shaking and mood volitile goes from playful to angry.  Upset at the moment his girlfriend isnt visiting him, then asked for cookies and snacks Electronically signed by Dillan Stewart RN on 2/18/2022 at 6:15 PM

## 2022-02-19 LAB
ANION GAP SERPL CALCULATED.3IONS-SCNC: 11 MEQ/L (ref 9–15)
BUN BLDV-MCNC: 13 MG/DL (ref 6–20)
CALCIUM SERPL-MCNC: 9.3 MG/DL (ref 8.5–9.9)
CHLORIDE BLD-SCNC: 101 MEQ/L (ref 95–107)
CO2: 26 MEQ/L (ref 20–31)
CREAT SERPL-MCNC: 0.83 MG/DL (ref 0.7–1.2)
GFR AFRICAN AMERICAN: >60
GFR NON-AFRICAN AMERICAN: >60
GLUCOSE BLD-MCNC: 95 MG/DL (ref 70–99)
OXCARBAZEPINE: <1 UG/ML (ref 3–35)
POTASSIUM REFLEX MAGNESIUM: 4 MEQ/L (ref 3.4–4.9)
SODIUM BLD-SCNC: 138 MEQ/L (ref 135–144)

## 2022-02-19 PROCEDURE — 6370000000 HC RX 637 (ALT 250 FOR IP): Performed by: PSYCHIATRY & NEUROLOGY

## 2022-02-19 PROCEDURE — 6370000000 HC RX 637 (ALT 250 FOR IP): Performed by: NURSE PRACTITIONER

## 2022-02-19 PROCEDURE — 80048 BASIC METABOLIC PNL TOTAL CA: CPT

## 2022-02-19 PROCEDURE — 6370000000 HC RX 637 (ALT 250 FOR IP): Performed by: INTERNAL MEDICINE

## 2022-02-19 PROCEDURE — 36415 COLL VENOUS BLD VENIPUNCTURE: CPT

## 2022-02-19 PROCEDURE — 1210000000 HC MED SURG R&B

## 2022-02-19 PROCEDURE — 6360000002 HC RX W HCPCS: Performed by: NURSE PRACTITIONER

## 2022-02-19 RX ADMIN — DIAZEPAM 2 MG: 2 TABLET ORAL at 08:35

## 2022-02-19 RX ADMIN — LORAZEPAM 2 MG: 1 TABLET ORAL at 20:38

## 2022-02-19 RX ADMIN — LORAZEPAM 2 MG: 1 TABLET ORAL at 17:33

## 2022-02-19 RX ADMIN — BENZTROPINE MESYLATE 2 MG: 1 TABLET ORAL at 20:38

## 2022-02-19 RX ADMIN — TRIHEXYPHENIDYL HYDROCHLORIDE 2 MG: 2 TABLET ORAL at 20:38

## 2022-02-19 RX ADMIN — LORAZEPAM 2 MG: 1 TABLET ORAL at 08:35

## 2022-02-19 RX ADMIN — BENZTROPINE MESYLATE 2 MG: 1 TABLET ORAL at 08:35

## 2022-02-19 RX ADMIN — HYDROCHLOROTHIAZIDE 25 MG: 25 TABLET ORAL at 08:35

## 2022-02-19 RX ADMIN — LORAZEPAM 2 MG: 1 TABLET ORAL at 12:49

## 2022-02-19 RX ADMIN — LOSARTAN POTASSIUM 100 MG: 100 TABLET, FILM COATED ORAL at 08:34

## 2022-02-19 RX ADMIN — TRIHEXYPHENIDYL HYDROCHLORIDE 2 MG: 2 TABLET ORAL at 08:34

## 2022-02-19 RX ADMIN — DIAZEPAM 2 MG: 2 TABLET ORAL at 20:38

## 2022-02-19 RX ADMIN — AMLODIPINE BESYLATE 10 MG: 10 TABLET ORAL at 08:35

## 2022-02-19 RX ADMIN — Medication 650 MG: at 20:38

## 2022-02-19 RX ADMIN — ENOXAPARIN SODIUM 40 MG: 100 INJECTION SUBCUTANEOUS at 08:35

## 2022-02-19 RX ADMIN — OXCARBAZEPINE 150 MG: 150 TABLET, FILM COATED ORAL at 08:35

## 2022-02-19 ASSESSMENT — PAIN SCALES - GENERAL
PAINLEVEL_OUTOF10: 1
PAINLEVEL_OUTOF10: 5

## 2022-02-19 NOTE — PROGRESS NOTES
Hospitalist Progress Note      PCP: Al Giron    Date of Admission: 2/15/2022    Chief Complaint:    Chief Complaint   Patient presents with    Shortness of Breath     pt c/o trouble breathing     Subjective:  Patient is complaining of hand pain again but that it is improving; denies fevers, chills, sweats, SOB. 12 point ROS negative other than mentioned above     Medications:  Reviewed    Infusion Medications    sodium chloride 100 mL/hr at 02/17/22 2056    sodium chloride       Scheduled Medications    amLODIPine  10 mg Oral Daily    hydroCHLOROthiazide  25 mg Oral QAM    losartan  100 mg Oral Daily    OXcarbazepine  150 mg Oral Daily    diazePAM  2 mg Oral BID    benztropine  2 mg Oral BID    trihexyphenidyl  2 mg Oral BID    sodium chloride flush  5-40 mL IntraVENous 2 times per day    enoxaparin  40 mg SubCUTAneous Daily     PRN Meds: LORazepam, sodium chloride flush, sodium chloride, ondansetron **OR** ondansetron, polyethylene glycol, acetaminophen **OR** acetaminophen, LORazepam    No intake or output data in the 24 hours ending 02/19/22 1302  Exam:    BP (!) 158/105   Pulse 116   Temp 97.9 °F (36.6 °C)   Resp 16   Ht 5' 6\" (1.676 m)   Wt 267 lb (121.1 kg)   SpO2 98%   BMI 43.09 kg/m²     General appearance: No apparent distress, appears stated age and cooperative. HEENT: Conjunctivae/corneas clear. Neck:  Trachea midline. Respiratory:  Normal respiratory effort. Clear to auscultation  Cardiovascular: Regular rate and rhythm   Abdomen: Soft, non-tender, non-distended with normal bowel sounds. Musculoskeletal: No clubbing, cyanosis or edema bilaterally. Neuro: Non Focal.   Capillary Refill: Brisk,< 3 seconds   Peripheral Pulses: +2 palpable, equal bilaterally     Labs:   No results for input(s): WBC, HGB, HCT, PLT in the last 72 hours.   Recent Labs     02/18/22  0804 02/19/22  0654    138   K 4.1 4.0   CL 99 101   CO2 28 26   BUN 10 13   CREATININE 0.77 0.83   CALCIUM 9.2 9.3     No results for input(s): AST, ALT, BILIDIR, BILITOT, ALKPHOS in the last 72 hours. No results for input(s): INR in the last 72 hours. Recent Labs     02/16/22  1640   CKTOTAL 577*     Urinalysis:      Lab Results   Component Value Date    NITRU Negative 02/15/2022    WBCUA 0-2 02/15/2022    BACTERIA Negative 02/15/2022    RBCUA 3-5 02/15/2022    BLOODU Negative 02/15/2022    SPECGRAV 1.044 02/15/2022    GLUCOSEU Negative 02/15/2022     Radiology:  CT HEAD WO CONTRAST   Final Result   Impression:      Bilateral maxillary sinusitis with bilateral maxillary retention cysts, greater on left. All CT scans at this facility use dose modulation, iterative reconstruction, and/or weight based dosing when appropriate to reduce radiation dose to as low as reasonably achievable. XR CHEST PORTABLE   Final Result   LIMITED EXAMINATION DUE TO LOW LUNG VOLUME. RECOMMEND REPEAT EXAM WITH BETTER RESPIRATORY EFFORT. NO ACUTE ACTIVE CARDIOPULMONARY PROCESS WITHIN THE LIMITS EXAMINATION        Assessment/Plan:    Dystonia: Recurrent and worsening painful muscle contractions of the hands bilaterally. Started after stopping Abilify in December 2021.   Psych and neurology are managing; doing much better today    - ok for discharge when ok with neurology     Elevated CK: Improving; can stop trending.     Elevated lactic acid: Resolved     Hypertension: Continue home meds  ADHD: Continue home meds  Bipolar 1 disorder, depressed, severe: Continue home meds; psych consulted and are assisting    Active Hospital Problems    Diagnosis Date Noted    Hypertension [I10] 04/04/2012     Priority: Medium    ADHD (attention deficit hyperactivity disorder) [F90.9] 04/04/2012     Priority: Medium    Dystonia [G24.9] 02/16/2022    Mental retardation [F79] 03/08/2018    Bipolar 1 disorder, depressed, severe ([facility] [address] [F31.4] 03/07/2018     Additional work up or/and treatment plan may be added today or then after based on clinical progression. I am managing a portion of pt care. Some medical issues are handled by other specialists. Additional work up and treatment should be done in out pt setting by pt PCP and other out pt providers. In addition to examining and evaluating pt, I spent additional time explaining care, normal and abnormal findings, and treatment plan. All of pt questions were answered. Counseling, diet and education were  provided. Case will be discussed with nursing staff when appropriate. Family will be updated if and when appropriate. Diet: ADULT DIET;  Regular    Code Status: Full Code     Possible d/c today or tomorrow depending on psych and neuro    lectronically signed by Lidia Lindo MD on 2/19/2022 at 1:02 PM

## 2022-02-19 NOTE — FLOWSHEET NOTE
Pt awake this am yelling at his girlfriend who is pregnant (Rylee Rodriguez) with his baby telling everyone that she cheated on him yelling in the vivar and carrying on about the matter. Pt continues acting up in the vivar saying he's going to let her go then he turned and starting to ask staff to come in his room to help him take care of personal things. Pt talking very sexy dancing and scooting around the room Explained to him that he was acting not very nice and that there is a lot of people in the hospital that are sick so he needed to act right,  Ativan given po since no iv access , breakfast came and he was complaining about the oatmeal that he didn't like it so I told him not to eat it. Pt also had several sandwiches in his room that were not in refrigator left on his table that I threw away. Pt requesting jello, ice cream,and crackers explained to him that he was on a carb controled diet and when the doctor comes in we can get it changed but until he can only have what the kitchen gives him. Pt then fell asleep and his mother anf mother of his baby came in mother asked how he was doing explained behavior and that the refused to was up so she made him get in the shower and wash up. After mother left this afternoon  Pt started again yelling at the girlfriend in the vivar explained to him if he continues to yell I will call security and have his girlfriend removed then she won't be aloud back up here he then became quiet and said he was sorry. Pt behaved rest of day. Electronically signed by Kingston Brewster LPN on 0/07/3024 at 9:61 PM   Pt again yelling in his room about his girlfriend screwing another man (cheating on him) had to give another dose of ativan Mom called and said this id not like him to stay wound up and she wants to talk to Dr. Trena Lora she thinks he needs more medication. She keeps saying something is wrong he don't act this way at home.  Call to Dioni Fang. but Jerardo Breaker won't be back until Monday. Electronically signed by Boone Christine LPN on 4/84/8629 at 9:54 PM

## 2022-02-20 LAB
ANION GAP SERPL CALCULATED.3IONS-SCNC: 12 MEQ/L (ref 9–15)
BUN BLDV-MCNC: 12 MG/DL (ref 6–20)
CALCIUM SERPL-MCNC: 9.4 MG/DL (ref 8.5–9.9)
CHLORIDE BLD-SCNC: 101 MEQ/L (ref 95–107)
CO2: 25 MEQ/L (ref 20–31)
CREAT SERPL-MCNC: 0.81 MG/DL (ref 0.7–1.2)
GFR AFRICAN AMERICAN: >60
GFR NON-AFRICAN AMERICAN: >60
GLUCOSE BLD-MCNC: 107 MG/DL (ref 70–99)
POTASSIUM REFLEX MAGNESIUM: 4 MEQ/L (ref 3.4–4.9)
SODIUM BLD-SCNC: 138 MEQ/L (ref 135–144)

## 2022-02-20 PROCEDURE — 6360000002 HC RX W HCPCS: Performed by: NURSE PRACTITIONER

## 2022-02-20 PROCEDURE — 6370000000 HC RX 637 (ALT 250 FOR IP): Performed by: PSYCHIATRY & NEUROLOGY

## 2022-02-20 PROCEDURE — 6370000000 HC RX 637 (ALT 250 FOR IP): Performed by: NURSE PRACTITIONER

## 2022-02-20 PROCEDURE — 80048 BASIC METABOLIC PNL TOTAL CA: CPT

## 2022-02-20 PROCEDURE — 1210000000 HC MED SURG R&B

## 2022-02-20 PROCEDURE — 36415 COLL VENOUS BLD VENIPUNCTURE: CPT

## 2022-02-20 PROCEDURE — 6360000002 HC RX W HCPCS: Performed by: INTERNAL MEDICINE

## 2022-02-20 PROCEDURE — 6370000000 HC RX 637 (ALT 250 FOR IP): Performed by: INTERNAL MEDICINE

## 2022-02-20 RX ORDER — LORAZEPAM 2 MG/ML
2 INJECTION INTRAMUSCULAR ONCE
Status: COMPLETED | OUTPATIENT
Start: 2022-02-20 | End: 2022-02-20

## 2022-02-20 RX ADMIN — OXCARBAZEPINE 150 MG: 150 TABLET, FILM COATED ORAL at 10:06

## 2022-02-20 RX ADMIN — ENOXAPARIN SODIUM 40 MG: 100 INJECTION SUBCUTANEOUS at 10:05

## 2022-02-20 RX ADMIN — LOSARTAN POTASSIUM 100 MG: 100 TABLET, FILM COATED ORAL at 10:06

## 2022-02-20 RX ADMIN — TRIHEXYPHENIDYL HYDROCHLORIDE 2 MG: 2 TABLET ORAL at 21:11

## 2022-02-20 RX ADMIN — DIAZEPAM 2 MG: 2 TABLET ORAL at 21:11

## 2022-02-20 RX ADMIN — DIAZEPAM 2 MG: 2 TABLET ORAL at 10:07

## 2022-02-20 RX ADMIN — BENZTROPINE MESYLATE 2 MG: 1 TABLET ORAL at 21:11

## 2022-02-20 RX ADMIN — HYDROCHLOROTHIAZIDE 25 MG: 25 TABLET ORAL at 10:06

## 2022-02-20 RX ADMIN — AMLODIPINE BESYLATE 10 MG: 10 TABLET ORAL at 10:06

## 2022-02-20 RX ADMIN — LORAZEPAM 2 MG: 1 TABLET ORAL at 12:25

## 2022-02-20 RX ADMIN — TRIHEXYPHENIDYL HYDROCHLORIDE 2 MG: 2 TABLET ORAL at 10:06

## 2022-02-20 RX ADMIN — BENZTROPINE MESYLATE 2 MG: 1 TABLET ORAL at 10:06

## 2022-02-20 RX ADMIN — LORAZEPAM 2 MG: 2 INJECTION INTRAMUSCULAR; INTRAVENOUS at 21:18

## 2022-02-20 RX ADMIN — LORAZEPAM 2 MG: 1 TABLET ORAL at 15:47

## 2022-02-20 NOTE — PROGRESS NOTES
Hospitalist Progress Note      PCP: Al Giron    Date of Admission: 2/15/2022    Chief Complaint:    Chief Complaint   Patient presents with    Shortness of Breath     pt c/o trouble breathing     Subjective:  Patients pain has suddenly worsened today; difficulty lifting his fork due to the pain. denies fevers, chills, sweats, SOB. 12 point ROS negative other than mentioned above     Medications:  Reviewed    Infusion Medications    sodium chloride 100 mL/hr at 02/17/22 2056    sodium chloride       Scheduled Medications    amLODIPine  10 mg Oral Daily    hydroCHLOROthiazide  25 mg Oral QAM    losartan  100 mg Oral Daily    OXcarbazepine  150 mg Oral Daily    diazePAM  2 mg Oral BID    benztropine  2 mg Oral BID    trihexyphenidyl  2 mg Oral BID    sodium chloride flush  5-40 mL IntraVENous 2 times per day    enoxaparin  40 mg SubCUTAneous Daily     PRN Meds: LORazepam, sodium chloride flush, sodium chloride, ondansetron **OR** ondansetron, polyethylene glycol, acetaminophen **OR** acetaminophen, LORazepam      Intake/Output Summary (Last 24 hours) at 2/20/2022 1258  Last data filed at 2/20/2022 1059  Gross per 24 hour   Intake 480 ml   Output --   Net 480 ml     Exam:    BP (!) 147/75   Pulse 99   Temp 97.7 °F (36.5 °C) (Oral)   Resp 16   Ht 5' 6\" (1.676 m)   Wt 267 lb (121.1 kg)   SpO2 100%   BMI 43.09 kg/m²     General appearance: No apparent distress, appears stated age and cooperative. HEENT: Conjunctivae/corneas clear. Neck:  Trachea midline. Respiratory:  Normal respiratory effort. Clear to auscultation  Cardiovascular: Regular rate and rhythm   Abdomen: Soft, non-tender, non-distended with normal bowel sounds. Musculoskeletal: No clubbing, cyanosis or edema bilaterally. Neuro: Non Focal.   Capillary Refill: Brisk,< 3 seconds   Peripheral Pulses: +2 palpable, equal bilaterally     Labs:   No results for input(s): WBC, HGB, HCT, PLT in the last 72 hours.   Recent Labs 02/18/22  0804 02/19/22  0654 02/20/22  0602    138 138   K 4.1 4.0 4.0   CL 99 101 101   CO2 28 26 25   BUN 10 13 12   CREATININE 0.77 0.83 0.81   CALCIUM 9.2 9.3 9.4     No results for input(s): AST, ALT, BILIDIR, BILITOT, ALKPHOS in the last 72 hours. No results for input(s): INR in the last 72 hours. No results for input(s): Kizzy Divine in the last 72 hours. Urinalysis:      Lab Results   Component Value Date    NITRU Negative 02/15/2022    WBCUA 0-2 02/15/2022    BACTERIA Negative 02/15/2022    RBCUA 3-5 02/15/2022    BLOODU Negative 02/15/2022    SPECGRAV 1.044 02/15/2022    GLUCOSEU Negative 02/15/2022     Radiology:  CT HEAD WO CONTRAST   Final Result   Impression:      Bilateral maxillary sinusitis with bilateral maxillary retention cysts, greater on left. All CT scans at this facility use dose modulation, iterative reconstruction, and/or weight based dosing when appropriate to reduce radiation dose to as low as reasonably achievable. XR CHEST PORTABLE   Final Result   LIMITED EXAMINATION DUE TO LOW LUNG VOLUME. RECOMMEND REPEAT EXAM WITH BETTER RESPIRATORY EFFORT. NO ACUTE ACTIVE CARDIOPULMONARY PROCESS WITHIN THE LIMITS EXAMINATION        Assessment/Plan:    Dystonia: Recurrent and worsening painful muscle contractions of the hands bilaterally. Started after stopping Abilify in December 2021.   Psych and neurology are managing; was initially doing much better this morning but worsened this afternoon    - will need to be reassessed; congentin was increased by neuro; awaiting aldose level still     Elevated CK: Improving; can stop trending.     Elevated lactic acid: Resolved     Hypertension: Continue home meds  ADHD: Continue home meds  Bipolar 1 disorder, depressed, severe: Continue home meds; psych consulted and are assisting    Active Hospital Problems    Diagnosis Date Noted    Hypertension [I10] 04/04/2012     Priority: Medium    ADHD (attention deficit hyperactivity disorder) [F90.9] 04/04/2012     Priority: Medium    Dystonia [G24.9] 02/16/2022    Mental retardation [F79] 03/08/2018    [facility], depressed[facility] [F31.4] 03/07/2018     Additional work up or/and treatment plan may be added today or then after based on clinical progression. I am managing a portion of pt care. Some medical issues are handled by other specialists. Additional work up and treatment should be done in out pt setting by pt PCP and other out pt providers. In addition to examining and evaluating pt, I spent additional time explaining care, normal and abnormal findings, and treatment plan. All of pt questions were answered. Counseling, diet and education were  provided. Case will be discussed with nursing staff when appropriate. Family will be updated if and when appropriate. Diet: ADULT DIET;  Regular    Code Status: Full Code     Possible d/c tomorrow if patient improves    lectronically signed by Isiah Cid MD on 2/20/2022 at 12:58 PM

## 2022-02-20 NOTE — PROGRESS NOTES
From: Home with family. PMH: Tardive dyskinesia, MRDD. Anticipated Discharge Date: TBD     Anticipated Discharge Disposition: Home with family + adult day care M-F. Patient Mobility or PT/OT ordered: Yes     Consults: Psych, neuro. Covid Test Date and Result: NA - Not tested. Barriers to Discharge:   - Per neuro, patient Harley Reddy", is \"not cooperative\", still w/ TD symptoms.  - Continues to have symptoms of dystonia. 2/20- PSYCH FOLLOWING, ADJUSTING MEDS. AWAIT PSYCH, NEURO CLEARANCE. --- CMI done.

## 2022-02-20 NOTE — PLAN OF CARE
Problem: Falls - Risk of:  Goal: Will remain free from falls  Outcome: Ongoing  Goal: Absence of physical injury  Outcome: Ongoing     Problem: Pain:  Goal: Pain level will decrease  Outcome: Ongoing  Goal: Control of acute pain  Outcome: Ongoing  Goal: Control of chronic pain  Outcome: Ongoing     Problem: IP MOBILITY  Goal: LTG - patient will demonstrate safe mobility requirements  Outcome: Ongoing     Problem: IP EATING  Goal: LTG - Patient will demonstrate use of compensatory intervention to feed self  Outcome: Ongoing

## 2022-02-21 ENCOUNTER — HOSPITAL ENCOUNTER (EMERGENCY)
Age: 27
Discharge: HOME | End: 2022-02-21
Attending: EMERGENCY MEDICINE
Payer: MEDICARE

## 2022-02-21 VITALS
WEIGHT: 267 LBS | TEMPERATURE: 98.1 F | DIASTOLIC BLOOD PRESSURE: 85 MMHG | BODY MASS INDEX: 42.91 KG/M2 | RESPIRATION RATE: 20 BRPM | OXYGEN SATURATION: 96 % | SYSTOLIC BLOOD PRESSURE: 147 MMHG | HEART RATE: 101 BPM | HEIGHT: 66 IN

## 2022-02-21 VITALS
RESPIRATION RATE: 22 BRPM | OXYGEN SATURATION: 98 % | HEART RATE: 103 BPM | BODY MASS INDEX: 42.91 KG/M2 | SYSTOLIC BLOOD PRESSURE: 146 MMHG | TEMPERATURE: 98.2 F | HEIGHT: 66 IN | DIASTOLIC BLOOD PRESSURE: 83 MMHG | WEIGHT: 267 LBS

## 2022-02-21 DIAGNOSIS — G24.9 DYSTONIA: Primary | ICD-10-CM

## 2022-02-21 LAB
ANION GAP SERPL CALCULATED.3IONS-SCNC: 12 MEQ/L (ref 9–15)
BUN BLDV-MCNC: 16 MG/DL (ref 6–20)
CALCIUM SERPL-MCNC: 9.4 MG/DL (ref 8.5–9.9)
CHLORIDE BLD-SCNC: 100 MEQ/L (ref 95–107)
CK MB: 11.4 NG/ML (ref 0–6.7)
CO2: 25 MEQ/L (ref 20–31)
CREAT SERPL-MCNC: 0.91 MG/DL (ref 0.7–1.2)
CREATINE KINASE-MB INDEX: 1.8 % (ref 0–3.5)
GFR AFRICAN AMERICAN: >60
GFR NON-AFRICAN AMERICAN: >60
GLUCOSE BLD-MCNC: 122 MG/DL (ref 70–99)
POTASSIUM REFLEX MAGNESIUM: 3.8 MEQ/L (ref 3.4–4.9)
SODIUM BLD-SCNC: 137 MEQ/L (ref 135–144)
TOTAL CK: 638 U/L (ref 0–190)

## 2022-02-21 PROCEDURE — 36415 COLL VENOUS BLD VENIPUNCTURE: CPT

## 2022-02-21 PROCEDURE — APPSS30 APP SPLIT SHARED TIME 16-30 MINUTES: Performed by: NURSE PRACTITIONER

## 2022-02-21 PROCEDURE — 96374 THER/PROPH/DIAG INJ IV PUSH: CPT

## 2022-02-21 PROCEDURE — 6370000000 HC RX 637 (ALT 250 FOR IP): Performed by: NURSE PRACTITIONER

## 2022-02-21 PROCEDURE — 6370000000 HC RX 637 (ALT 250 FOR IP): Performed by: INTERNAL MEDICINE

## 2022-02-21 PROCEDURE — 80048 BASIC METABOLIC PNL TOTAL CA: CPT

## 2022-02-21 PROCEDURE — 99232 SBSQ HOSP IP/OBS MODERATE 35: CPT | Performed by: PSYCHIATRY & NEUROLOGY

## 2022-02-21 PROCEDURE — 6370000000 HC RX 637 (ALT 250 FOR IP): Performed by: PSYCHIATRY & NEUROLOGY

## 2022-02-21 PROCEDURE — 99283 EMERGENCY DEPT VISIT LOW MDM: CPT

## 2022-02-21 PROCEDURE — 6360000002 HC RX W HCPCS: Performed by: EMERGENCY MEDICINE

## 2022-02-21 PROCEDURE — 99233 SBSQ HOSP IP/OBS HIGH 50: CPT | Performed by: PSYCHIATRY & NEUROLOGY

## 2022-02-21 PROCEDURE — 6360000002 HC RX W HCPCS: Performed by: NURSE PRACTITIONER

## 2022-02-21 PROCEDURE — 82550 ASSAY OF CK (CPK): CPT

## 2022-02-21 PROCEDURE — 82553 CREATINE MB FRACTION: CPT

## 2022-02-21 RX ORDER — OXCARBAZEPINE 300 MG/1
300 TABLET, FILM COATED ORAL DAILY
Qty: 30 TABLET | Refills: 2 | Status: SHIPPED | OUTPATIENT
Start: 2022-02-22

## 2022-02-21 RX ORDER — OXCARBAZEPINE 150 MG/1
300 TABLET, FILM COATED ORAL DAILY
Status: DISCONTINUED | OUTPATIENT
Start: 2022-02-22 | End: 2022-02-21 | Stop reason: HOSPADM

## 2022-02-21 RX ORDER — BENZTROPINE MESYLATE 2 MG/1
2 TABLET ORAL 2 TIMES DAILY
Qty: 60 TABLET | Refills: 3 | Status: SHIPPED | OUTPATIENT
Start: 2022-02-21 | End: 2022-06-16 | Stop reason: ALTCHOICE

## 2022-02-21 RX ORDER — TRIHEXYPHENIDYL HYDROCHLORIDE 2 MG/1
2 TABLET ORAL 2 TIMES DAILY
Qty: 90 TABLET | Refills: 3 | Status: SHIPPED | OUTPATIENT
Start: 2022-02-21 | End: 2022-06-16

## 2022-02-21 RX ORDER — DIPHENHYDRAMINE HYDROCHLORIDE 50 MG/ML
50 INJECTION INTRAMUSCULAR; INTRAVENOUS ONCE
Status: COMPLETED | OUTPATIENT
Start: 2022-02-21 | End: 2022-02-21

## 2022-02-21 RX ADMIN — TRIHEXYPHENIDYL HYDROCHLORIDE 2 MG: 2 TABLET ORAL at 08:31

## 2022-02-21 RX ADMIN — LORAZEPAM 2 MG: 1 TABLET ORAL at 09:28

## 2022-02-21 RX ADMIN — LOSARTAN POTASSIUM 100 MG: 100 TABLET, FILM COATED ORAL at 08:31

## 2022-02-21 RX ADMIN — Medication 650 MG: at 09:28

## 2022-02-21 RX ADMIN — BENZTROPINE MESYLATE 2 MG: 1 TABLET ORAL at 09:28

## 2022-02-21 RX ADMIN — ENOXAPARIN SODIUM 40 MG: 100 INJECTION SUBCUTANEOUS at 08:31

## 2022-02-21 RX ADMIN — DIAZEPAM 2 MG: 2 TABLET ORAL at 08:31

## 2022-02-21 RX ADMIN — OXCARBAZEPINE 150 MG: 150 TABLET, FILM COATED ORAL at 08:31

## 2022-02-21 RX ADMIN — AMLODIPINE BESYLATE 10 MG: 10 TABLET ORAL at 08:31

## 2022-02-21 RX ADMIN — HYDROCHLOROTHIAZIDE 25 MG: 25 TABLET ORAL at 08:31

## 2022-02-21 RX ADMIN — DIPHENHYDRAMINE HYDROCHLORIDE 50 MG: 50 INJECTION, SOLUTION INTRAMUSCULAR; INTRAVENOUS at 17:10

## 2022-02-21 ASSESSMENT — ENCOUNTER SYMPTOMS
ABDOMINAL DISTENTION: 0
VOMITING: 0
WHEEZING: 0
COLOR CHANGE: 0
COUGH: 0
TROUBLE SWALLOWING: 0

## 2022-02-21 ASSESSMENT — PAIN DESCRIPTION - DESCRIPTORS: DESCRIPTORS: CRAMPING

## 2022-02-21 ASSESSMENT — PAIN DESCRIPTION - ORIENTATION: ORIENTATION: LEFT;RIGHT

## 2022-02-21 ASSESSMENT — PAIN SCALES - GENERAL
PAINLEVEL_OUTOF10: 9
PAINLEVEL_OUTOF10: 10

## 2022-02-21 ASSESSMENT — PAIN DESCRIPTION - LOCATION: LOCATION: HAND

## 2022-02-21 ASSESSMENT — PAIN DESCRIPTION - FREQUENCY: FREQUENCY: CONTINUOUS

## 2022-02-21 ASSESSMENT — PAIN - FUNCTIONAL ASSESSMENT: PAIN_FUNCTIONAL_ASSESSMENT: 0-10

## 2022-02-21 ASSESSMENT — PAIN DESCRIPTION - PAIN TYPE: TYPE: ACUTE PAIN

## 2022-02-21 NOTE — PROGRESS NOTES
[facility] Neurology Daily Progress Note  Name: Anabelle Wood  Age: 32 y.o. Gender: male  CodeStatus: Full Code  Allergies: No Known Allergies    Chief Complaint:Shortness of Breath (pt c/o trouble breathing)    Primary Care Provider: Fer Su  InpatientTreatment Team: Treatment Team: Attending Provider: Pedro Luis Hyman DO; Consulting Physician: Salinas Zamorano MD; Consulting Physician: Shruthi Ferguson MD; Utilization Reviewer: Jessica Leyva RN; : Henrietta Higgins. Holowecky, RN; Registered Nurse: Jaylon Bai RN; Utilization Reviewer: Wale Ybarra RN  Admission Date: 2/15/2022         Pt seen and examined for neuro follow up for tardive dyskinesia and dystonia. Patient currently eating and will not arouse for exam.  Nursing reports that he will do this for them as well but when they say that they cannot give him his meds if he does not wake up he will wake up and say \"ha ha gotcha\". Patient is sleeping as mentioned above and does not have any abnormal movements at this time. Mother is at bedside and reports that he has continued erratic movements of upper extremities when awake as well as lip smacking and tongue protrusions that have been going on for many months now. Behavioral issues, nore then dystonia,    Vitals:    02/21/22 0821   BP: (!) 147/85   Pulse:    Resp:    Temp:    SpO2:       Review of Systems   Unable to perform ROS: Other   Constitutional: Negative for fatigue and fever. HENT: Negative for hearing loss and trouble swallowing. Respiratory: Negative for cough and wheezing. Cardiovascular: Negative for leg swelling. Gastrointestinal: Negative for abdominal distention and vomiting. Genitourinary: Negative for difficulty urinating. Skin: Negative for color change and rash. Neurological: Negative for tremors, seizures, syncope and facial asymmetry. Psychiatric/Behavioral: Positive for behavioral problems. Negative for agitation. The patient is not nervous/anxious. Physical Exam  Vitals and nursing note reviewed. Constitutional:       General: He is sleeping. He is not in acute distress. Appearance: He is obese. He is not diaphoretic. HENT:      Head: Normocephalic and atraumatic. Eyes:      Pupils: Pupils are equal, round, and reactive to light. Cardiovascular:      Rate and Rhythm: Normal rate and regular rhythm. Pulmonary:      Effort: Pulmonary effort is normal. No respiratory distress. Breath sounds: Normal breath sounds. Abdominal:      General: Bowel sounds are normal. There is no distension. Palpations: Abdomen is soft. Skin:     General: Skin is warm and dry. Neurological:      Comments: Neuro exam limited as patient is very somnolent and will not wake up long enough to participate with neuro exam.  No obvious involuntary jerking or twitching noted currently. Nursing and mother reports he has had intermittent lipsmacking and protrusion of the tongue as well as erratic movement of the upper extremities when awake. No new changes,         Medications:  Reviewed    Infusion Medications:    sodium chloride       Scheduled Medications:    amLODIPine  10 mg Oral Daily    hydroCHLOROthiazide  25 mg Oral QAM    losartan  100 mg Oral Daily    OXcarbazepine  150 mg Oral Daily    diazePAM  2 mg Oral BID    benztropine  2 mg Oral BID    trihexyphenidyl  2 mg Oral BID    sodium chloride flush  5-40 mL IntraVENous 2 times per day    enoxaparin  40 mg SubCUTAneous Daily     PRN Meds: LORazepam, sodium chloride flush, sodium chloride, ondansetron **OR** ondansetron, polyethylene glycol, acetaminophen **OR** acetaminophen    Labs:   No results for input(s): WBC, HGB, HCT, PLT in the last 72 hours.   Recent Labs     02/19/22  0654 02/20/22  0602 02/21/22  0623    138 137   K 4.0 4.0 3.8    101 100   CO2 26 25 25   BUN 13 12 16   CREATININE 0.83 0.81 0.91   CALCIUM 9.3 9.4 9.4     No results for input(s): AST, ALT, BILIDIR, BILITOT, ALKPHOS in the last 72 hours. No results for input(s): INR in the last 72 hours. No results for input(s): Michelle Anchors in the last 72 hours. Urinalysis:   Lab Results   Component Value Date    NITRU Negative 02/15/2022    WBCUA 0-2 02/15/2022    BACTERIA Negative 02/15/2022    RBCUA 3-5 02/15/2022    BLOODU Negative 02/15/2022    SPECGRAV 1.044 02/15/2022    GLUCOSEU Negative 02/15/2022       Radiology:   Most recent    EEG No valid procedures specified. MRI of Brain No results found for this or any previous visit. No results found for this or any previous visit. MRA of the Head and Neck: No results found for this or any previous visit. No results found for this or any previous visit. No results found for this or any previous visit. CT of the Head: Results for orders placed during the hospital encounter of 02/15/22    [address]  CT Brain. Contrast medium:  without contrast.. History:  Dystonia. Technical factors: CT imaging of the brain was obtained and formatted as 5 mm contiguous axial images. 2.5 mm contiguous axial images were obtained through the osseous structures. Sagittal and coronal reconstruction obtained during postprocessing. Comparison:  None. Findings:    Extra-axial spaces:  Normal.    Intracranial hemorrhage:  None. Ventricular system: [Negative. Basal Cisterns:  Without anomaly. Cerebral Parenchyma: Without anomaly. Midline Shift:  None. Cerebellum:  No anomaly identified. Paranasal sinuses and mastoid air cells:  Bilateral maxillary mucosal thickening with rounded soft tissue attenuation, bilateral maxillary sinuses, greater on left. Visualized Orbits:  Negative. Impression  Impression:    Bilateral maxillary sinusitis with bilateral maxillary retention cysts, greater on left.       All CT scans at this facility use dose modulation, iterative reconstruction, and/or weight based dosing when appropriate to reduce radiation dose to as low as reasonably achievable. No results found for this or any previous visit. No results found for this or any previous visit. Carotid duplex: No results found for this or any previous visit. No results found for this or any previous visit. No results found for this or any previous visit. Echo No results found for this or any previous visit. Assessment/Plan:    Dystonia secondary to previous medication use. Believe he has been tapered off has been on Cogentin and Benadryl for some time. The last week his symptoms have increased. History is obtained from the mother in much detail. At this time recommended that we keep him on Cogentin though at a higher dose with addition of Artane 2 mg twice a day. We will arrange for an EEG to make sure this are not central and also if this continues we may consider other options. Patient has already been tried [place]edo. When he does not respond to this we may consider Ingrezza. Patient has not any history of seizure disorder and a functional level history level of function well. Recommended a baseline CT scan to make sure there is no hydrocephalus    2/18/22:  CT the head negative for NPH or acute findings  EEG completed and essentially normal.  CK levels mildly elevated at 577 which is improved since admission. But it appears that his CK levels have been elevated on previous admissions as well. 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 Abilify has been discontinued. TSH was normal at 1.120 on 1/25/2022. Will check aldolase and myositis panel. 2/19/22: We will repeat CK level given reports from nursing and other provider documentation that the patient is with increasing pain.   Mother is concerned regarding need for ongoing treatment for bipolar disorder. Nursing advised to notify psychiatry. Aldolase and myositis panel pending. Patient continues on Cogentin, Valium, Trileptal.    I have personally performed a face to face diagnostic evaluation on this patient, reviewed all data and investigations, and am the sole provider of all clinical decisions on the neurological status of this patient. CPK secondary to agitation, CPK higher in young,  males,       Melquiades Garcia MD, Purnima Rosario, American Board of Psychiatry & Neurology  Board Certified in Vascular Neurology  Board Certified in Neuromuscular Medicine  Certified in Neurorehabilitation     Collaborating physicians: Dr Katia Garcia    Electronically signed by COLT Islas CNP on 2/21/2022 at 10:49 AM

## 2022-02-21 NOTE — DISCHARGE SUMMARY
Physician Discharge Summary     Patient ID:  Marie Ralph  [id number]  75 y.o.  1995    Admit date: 2/15/2022    Discharge date : 02/21/22     Admitting Physician: Rani Staley DO     Discharge Physician: Joseluis St DO     Admission Diagnoses: Dystonia [G24.9]    Discharge Diagnoses: Dystonia    Admission Condition: fair    Discharged Condition: good    Hospital Course: Patient being admitted for dystonia. Patient has been to the ED 3 times for similar symptoms as today. Patient reports that he has severe pain in his hands bilaterally with muscle cramping. Patient has a history of ADHD, bipolar, and mental retardation and was recently taken off of Abilify in December 2021 due to developing tardive dyskinesia. Patient is now on Cogentin instead. When patient has these exacerbations he takes Ativan, Benadryl, and Tylenol. However the symptoms have continued to recur and become more severe. Patient then becomes very anxious and begins to panic. Patient was noticeably diaphoretic in the ED during these events as well as becoming tachycardic. Pt admitted. Neuro and psych consulted and adjusted his medication regimen with improvement in his symptoms. Neuro and psych were ok with discharge on 2/21 and pt discharged in stable and improved condition. Consults: neurology and psychiatry      Discharge Exam:  General appearance: No apparent distress, appears stated age and cooperative. HEENT: Conjunctivae/corneas clear. Neck:  Trachea midline. Respiratory:  Normal respiratory effort. Clear to auscultation  Cardiovascular: Regular rate and rhythm   Abdomen: Soft, non-tender, non-distended with normal bowel sounds. Musculoskeletal: No clubbing, cyanosis or edema bilaterally. Neuro: Non Focal.   Capillary Refill: Brisk,< 3 seconds   Peripheral Pulses: +2 palpable, equal bilaterally     Labs:   No results for input(s): WBC, HGB, HCT, PLT in the last 72 hours.   Recent Labs     02/19/22  0654 02/20/22  0602 02/21/22  0623    138 137   K 4.0 4.0 3.8    101 100   CO2 26 25 25   BUN 13 12 16   CREATININE 0.83 0.81 0.91   CALCIUM 9.3 9.4 9.4     No results for input(s): AST, ALT, BILIDIR, BILITOT, ALKPHOS in the last 72 hours. No results for input(s): INR in the last 72 hours. Recent Labs     02/21/22  1033   CKTOTAL 638*       Urinalysis:   Lab Results   Component Value Date    NITRU Negative 02/15/2022    WBCUA 0-2 02/15/2022    BACTERIA Negative 02/15/2022    RBCUA 3-5 02/15/2022    BLOODU Negative 02/15/2022    SPECGRAV 1.044 02/15/2022    GLUCOSEU Negative 02/15/2022       Radiology:   Most recent    Chest CT      WITH CONTRAST:No results found for this or any previous visit. WITHOUT CONTRAST: No results found for this or any previous visit. CXR      2-view: No results found for this or any previous visit. Portable: Results for orders placed during the hospital encounter of 02/15/22    XR CHEST PORTABLE    Narrative  EXAMINATION: CHEST PORTABLE VIEW    CLINICAL HISTORY: Short of breath    COMPARISONS: April 21, 2006    FINDINGS:    Single  views of the chest is submitted. Limited examination due to Y. The cardiac silhouette is enlarged 20 by low lung volume  Pulmonary vascular unremarkable. Right sided trachea. No focal infiltrates. No Pneumothoraces. Impression  LIMITED EXAMINATION DUE TO LOW LUNG VOLUME. RECOMMEND REPEAT EXAM WITH BETTER RESPIRATORY EFFORT. NO ACUTE ACTIVE CARDIOPULMONARY PROCESS WITHIN THE LIMITS EXAMINATION      Echo No results found for this or any previous visit.       Disposition: home    In process/preliminary results:  Outstanding Order Results     Date and Time Order Name Status Description    2/18/2022  3:30 PM MISC ARUP 1 Preliminary     2/18/2022  3:13 PM ALDOLASE In process     2/17/2022  5:58 PM EEG REPORT Preliminary           Patient Instructions:   Current Discharge Medication List      START taking these medications    Details   trihexyphenidyl (ARTANE) 2 MG tablet Take 1 tablet by mouth 2 times daily  Qty: 90 tablet, Refills: 3         CONTINUE these medications which have CHANGED    Details   benztropine (COGENTIN) 2 MG tablet Take 1 tablet by mouth 2 times daily  Qty: 60 tablet, Refills: 3      OXcarbazepine (TRILEPTAL) 300 MG tablet Take 1 tablet by mouth daily  Qty: 30 tablet, Refills: 2         CONTINUE these medications which have NOT CHANGED    Details   amLODIPine (NORVASC) 10 MG tablet TAKE 1 TABLET BY MOUTH EVERY DAY  Qty: 90 tablet, Refills: 1    Associated Diagnoses: Obesity (BMI 30-39.9); Essential hypertension      hydroCHLOROthiazide (HYDRODIURIL) 25 MG tablet TAKE 1 TABLET BY MOUTH EVERY MORNING  Qty: 90 tablet, Refills: 1      losartan (COZAAR) 100 MG tablet TAKE 1 TABLET BY MOUTH EVERY DAY  Qty: 90 tablet, Refills: 1      Blood Pressure Monitoring (ADULT BLOOD PRESSURE CUFF LG) KIT Check blood pressure every other day  Qty: 1 kit, Refills: 0           Activity: activity as tolerated  Diet: regular diet  Wound Care: none needed    Follow-up with CARLITOS ANTHONY  in 1 week.     DC time 35 minutes    Signed:  Electronically signed by Jorge Poon DO on 2/21/2022 at 3:56 PM

## 2022-02-21 NOTE — PROGRESS NOTES
Message to Dr Arielle Elam regarding order for 1:1 sitter at bedside. OK to d/c  One time dose of IM Ativan given. Pt's girlfriend at bedside. Pt calm and cooperative at this time. Says he is going to behave because I \"do not irritate him. \"

## 2022-02-21 NOTE — PROGRESS NOTES
[facility] [place] [facility]. FOLLOW-UP NOTE       2/21/2022     Patient was seen and examined in person, Chart reviewed   Patient's case discussed with staff/team    Chief Complaint: Involuntary movement    Interim History:     Spoke with patient and mom  Mom noticed some hypomanic tendencies over the weekend  Pt denies any active SI or HI  No Hopeless or worthless feeling  No agitation  Still mild perioral movement present  Appetite:   [x] Normal/Unchanged  [] Increased  [] Decreased      Sleep:       [x] Normal/Unchanged  [] Fair       [] Poor              Energy:    [x] Normal/Unchanged  [] Increased  [] Decreased        SI [] Present  [x] Absent    HI  []Present  [x] Absent     Aggression:  [] yes  [] no    Patient is [] able  [] unable to CONTRACT FOR SAFETY     PAST MEDICAL/PSYCHIATRIC HISTORY:   Past Medical History:   Diagnosis Date    ADHD (attention deficit hyperactivity disorder) 4/4/2012    Allergic rhinitis 4/4/2012    Hypertension     Obesity (BMI 30-39.9) 4/4/2012    Sleep apnea        FAMILY/SOCIAL HISTORY:  Family History   Problem Relation Age of Onset    No Known Problems Mother     No Known Problems Father      Social History     Socioeconomic History    Marital status: Single     Spouse name: Not on file    Number of children: Not on file    Years of education: Not on file    Highest education level: Not on file   Occupational History    Not on file   Tobacco Use    Smoking status: Former Smoker     Packs/day: 0.50     Years: 7.00     Pack years: 3.50     Types: Cigarettes     Start date: 11/25/2013     Quit date: 6/1/2018     Years since quitting: 3.7    Smokeless tobacco: Never Used   Substance and Sexual Activity    Alcohol use: Yes     Comment: occ    Drug use: No    Sexual activity: Not on file   Other Topics Concern    Not on file   Social History Narrative    Not on file     Social Determinants of Health     Financial Resource Strain:    [place] Difficulty of Paying Living Expenses: Not on file   Food Insecurity:     Worried About Running Out of Food in the Last Year: Not on file    Ran Out of Food in the Last Year: Not on file   Transportation Needs:     Lack of Transportation (Medical): Not on file    Lack of Transportation (Non-Medical): Not on file   Physical Activity:     Days of Exercise per Week: Not on file    Minutes of Exercise per Session: Not on file   Stress:     Feeling of Stress : Not on file   Social Connections:     Frequency of Communication with Friends and Family: Not on file    Frequency of Social Gatherings with Friends and Family: Not on file    Attends Bahai Services: Not on file    Active Member of [address] [place] [address] [employer] or Organizations: Not on file    Attends Club or Organization Meetings: Not on file    Marital Status: Not on file   Intimate Partner Violence:     Fear of Current or Ex-Partner: Not on file    Emotionally Abused: Not on file    Physically Abused: Not on file    Sexually Abused: Not on file   Housing Stability:     Unable to Pay for Housing in the Last Year: Not on file    Number of [place] in the Last Year: Not on file    Unstable Housing in the Last Year: Not on file           ROS:  [x] All negative/unchanged except if checked.  Explain positive(checked items) below:  [] Constitutional  [] Eyes  [] Ear/Nose/Mouth/Throat  [] Respiratory  [] CV  [] GI  []   [] Musculoskeletal  [] Skin/Breast  [] Neurological  [] Endocrine  [] Heme/Lymph  [] Allergic/Immunologic    Explanation:     MEDICATIONS:    Current Facility-Administered Medications:     amLODIPine (NORVASC) tablet 10 mg, 10 mg, Oral, Daily, Bowmore All, APRN - NP, 10 mg at 02/21/22 0831    hydroCHLOROthiazide (HYDRODIURIL) tablet 25 mg, 25 mg, Oral, QAM, Bowmore All, APRN - NP, 25 mg at 02/21/22 0831    losartan (COZAAR) tablet 100 mg, 100 mg, Oral, Daily, Bowmore All, APRN - NP, 100 mg at 02/21/22 0831    OXcarbazepine (TRILEPTAL) tablet 150 mg, 150 mg, Oral, Daily, Shannon Villarreal, APRN - NP, 150 mg at 02/21/22 0831    diazePAM (VALIUM) tablet 2 mg, 2 mg, Oral, BID, Ruperto Culp MD, 2 mg at 02/21/22 0831    LORazepam (ATIVAN) tablet 2 mg, 2 mg, Oral, Q2H PRN, Fabien Isbell MD, 2 mg at 02/21/22 3608    benztropine (COGENTIN) tablet 2 mg, 2 mg, Oral, BID, Nena De Anda MD, 2 mg at 02/21/22 9336    trihexyphenidyl (ARTANE) tablet 2 mg, 2 mg, Oral, BID, Nena De Anda MD, 2 mg at 02/21/22 0831    sodium chloride flush 0.9 % injection 5-40 mL, 5-40 mL, IntraVENous, 2 times per day, Nicochriso Bolzan-Roche, APRN - CNP, 10 mL at 02/18/22 0040    sodium chloride flush 0.9 % injection 5-40 mL, 5-40 mL, IntraVENous, PRN, Nicochriso Bolzan-Roche, APRN - CNP    0.9 % sodium chloride infusion, 25 mL, IntraVENous, PRN, Nicoletto Bolzan-Roche, APRN - CNP    enoxaparin (LOVENOX) injection 40 mg, 40 mg, SubCUTAneous, Daily, Nicoletto Bolzan-Roche, APRN - CNP, 40 mg at 02/21/22 0831    ondansetron (ZOFRAN-ODT) disintegrating tablet 4 mg, 4 mg, Oral, Q8H PRN **OR** ondansetron (ZOFRAN) injection 4 mg, 4 mg, IntraVENous, Q6H PRN, Nicoletto Bolzan-Roche, APRN - CNP, 4 mg at 02/16/22 0803    polyethylene glycol (GLYCOLAX) packet 17 g, 17 g, Oral, Daily PRN, Nicoletto Bolzan-Roche, APRN - CNP    acetaminophen (TYLENOL) tablet 650 mg, 650 mg, Oral, Q6H PRN, 650 mg at 02/21/22 0928 **OR** acetaminophen (TYLENOL) suppository 650 mg, 650 mg, Rectal, Q6H PRN, Nicoletto Bolzan-Roche, APRN - CNP      Examination:  BP (!) 147/85   Pulse 101   Temp 98.1 °F (36.7 °C) (Oral)   Resp 20   Ht 5' 6\" (1.676 m)   Wt 267 lb (121.1 kg)   SpO2 96%   BMI 43.09 kg/m²   Gait - steady  Medication side effects(SE): no    Mental Status Examination:    Level of consciousness:  within normal limits   Appearance:  fair grooming and fair hygiene  Behavior/Motor:  no abnormalities noted  Attitude toward examiner:  cooperative  Speech:  normal rate   Mood: anxious  Affect: mood congruent  Thought processes:  goal directed   Thought content:  Suicidal Ideation:  denies suicidal ideation  Cognition:  oriented to person, place, and time   Concentration intact  Insight fair   Judgement fair     ASSESSMENT:   Patient symptoms are:  [] Well controlled  [] Improving  [] Worsening  [] No change      Diagnosis:   Tardive Akathisia/ Dyskinesia  Active Problems:    Hypertension    ADHD (attention deficit hyperactivity disorder)    Bipolar 1 disorder, depressed, severe ([address]    Mental retardation    Dystonia  Resolved Problems:    * No resolved hospital problems. *      LABS:    No results for input(s): WBC, HGB, PLT in the last 72 hours. Recent Labs     02/19/22  0654 02/20/22  0602 02/21/22  0623    138 137   K 4.0 4.0 3.8    101 100   CO2 26 25 25   BUN 13 12 16   CREATININE 0.83 0.81 0.91   GLUCOSE 95 107* 122*     No results for input(s): BILITOT, ALKPHOS, AST, ALT in the last 72 hours. Lab Results   Component Value Date    LABAMPH Neg 02/15/2022    BARBSCNU Neg 02/15/2022    LABBENZ POSITIVE 02/15/2022    LABMETH Neg 02/15/2022    OPIATESCREENURINE Neg 02/15/2022    PHENCYCLIDINESCREENURINE Neg 02/15/2022    ETOH <10 02/15/2022     Lab Results   Component Value Date    TSH 1.120 01/25/2022     No results found for: LITHIUM  Lab Results   Component Value Date    VALPROATE 3.9 (L) 01/25/2022       Treatment Plan:  Reviewed current Medications with the patient. D/C valium  Increase trileptal to 300 mg po bid  Pt is not suicidal or agitated  Risks, benefits, side effects, drug-to-drug interactions and alternatives to treatment were discussed.   Can be discharged from psych standpoint    Electronically signed by Festus Dover MD on 2/21/2022 at 1:55 PM

## 2022-02-21 NOTE — PLAN OF CARE
Problem: Falls - Risk of:  Goal: Will remain free from falls  Description: Will remain free from falls  Outcome: Ongoing  Goal: Absence of physical injury  Description: Absence of physical injury  Outcome: Ongoing     Problem: Pain:  Goal: Pain level will decrease  Description: Pain level will decrease  Outcome: Ongoing  Goal: Control of acute pain  Description: Control of acute pain  Outcome: Ongoing  Goal: Control of chronic pain  Description: Control of chronic pain  Outcome: Ongoing     Problem: IP MOBILITY  Goal: LTG - patient will demonstrate safe mobility requirements  Outcome: Ongoing     Problem: IP EATING  Goal: LTG - Patient will demonstrate use of compensatory intervention to feed self  Outcome: Ongoing

## 2022-02-21 NOTE — ED NOTES
Bed: 03  Expected date: 2/21/22  Expected time:   Means of arrival:   Comments:  Hold for rapid response     Izabel Rutledge RN  02/21/22 2880

## 2022-02-21 NOTE — ED TRIAGE NOTES
Pt reports to the ED via rapid response pt was in the main lobby after discharge from hospital and pt was diaphoretic and stating he was having 10-10 pain in his hands pt reports hand cramps. Pt denies cp,nvd, fever, chills.

## 2022-02-21 NOTE — PLAN OF CARE
Problem: Falls - Risk of:  Goal: Will remain free from falls  2/21/2022 0939 by Sigifredo Bai RN  Outcome: Ongoing  2/21/2022 0214 by Janessa Jamison RN  Outcome: Ongoing  Goal: Absence of physical injury  2/21/2022 0939 by Sigifredo Bai RN  Outcome: Ongoing  2/21/2022 0214 by Janessa Jamison RN  Outcome: Ongoing     Problem: Pain:  Goal: Pain level will decrease  2/21/2022 0939 by Sigifredo Bai, RN  Outcome: Ongoing  2/21/2022 0214 by Janessa Jamison RN  Outcome: Ongoing  Goal: Control of acute pain  2/21/2022 0939 by Sigifredo Bai RN  Outcome: Ongoing  2/21/2022 0214 by Janessa Jamison RN  Outcome: Ongoing  Goal: Control of chronic pain  2/21/2022 0939 by Sigifredo Bai RN  Outcome: Ongoing  2/21/2022 0214 by Janessa Jamison RN  Outcome: Ongoing     Problem: IP MOBILITY  Goal: LTG - patient will demonstrate safe mobility requirements  2/21/2022 0939 by Sigifredo Bai RN  Outcome: Ongoing  2/21/2022 0214 by Janessa Jamison RN  Outcome: Ongoing     Problem: IP EATING  Goal: LTG - Patient will demonstrate use of compensatory intervention to feed self  2/21/2022 0939 by Sigifredo Bai RN  Outcome: Ongoing  2/21/2022 0214 by Janessa Jamison RN  Outcome: Ongoing

## 2022-02-22 NOTE — ED NOTES
Discharge instructions reviewed with patient. Patient denies any further questions at this time. Pt encouraged to make follow up appointments with PCP and any speciality referrals.         Kevin Casiano RN  02/21/22 4750

## 2022-02-23 ASSESSMENT — ENCOUNTER SYMPTOMS
NAUSEA: 0
SORE THROAT: 0
CHEST TIGHTNESS: 0
VOMITING: 0
EYE PAIN: 0
SHORTNESS OF BREATH: 0
ABDOMINAL PAIN: 0

## 2022-02-23 NOTE — ED PROVIDER NOTES
[address] [facility] ED  EMERGENCY DEPARTMENT ENCOUNTER      Pt Name: Miryam Kaufman  MRN: [id number]  A[place]ellis 1995  Date of evaluation: 2/21/2022  Provider: Willy Ross DO    CHIEF COMPLAINT       Chief Complaint   Patient presents with    Other     hand cramps          HISTORY OF PRESENT ILLNESS   (Location/Symptom, Timing/Onset, Context/Setting, Quality, Duration, Modifying Factors, Severity)  Note limiting factors. Miryam Kaufman is a 32 y.o. male who presents to the emergency department . Patient was literally just discharged from the hospital and comes down to the ER with hand cramps. Patient was admitted for\" dystonia\". He was stabilized by neurology service and was doing just fine until he was picked up by his mother. Patient does have autism and bipolar 1 disorder, mental retardation. HPI    Nursing Notes were reviewed. REVIEW OF SYSTEMS    (2-9 systems for level 4, 10 or more for level 5)     Review of Systems   Constitutional: Positive for diaphoresis. Negative for activity change, appetite change and fatigue. HENT: Negative for congestion and sore throat. Eyes: Negative for pain and visual disturbance. Respiratory: Negative for chest tightness and shortness of breath. Cardiovascular: Negative for chest pain. Gastrointestinal: Negative for abdominal pain, nausea and vomiting. Endocrine: Negative for polydipsia. Genitourinary: Negative for flank pain and urgency. Musculoskeletal: Positive for myalgias. Negative for gait problem and neck stiffness. Skin: Negative for rash. Neurological: Negative for weakness, light-headedness and headaches. Psychiatric/Behavioral: Negative for confusion and sleep disturbance. The patient is nervous/anxious. Except as noted above the remainder of the review of systems was reviewed and negative.        PAST MEDICAL HISTORY     Past Medical History:   Diagnosis Date    ADHD (attention deficit hyperactivity disorder) 4/4/2012    Allergic rhinitis 4/4/2012    Hypertension     Obesity (BMI 30-39.9) 4/4/2012    Sleep apnea          SURGICAL HISTORY       Past Surgical History:   Procedure Laterality Date    TONSILLECTOMY           CURRENT MEDICATIONS       Discharge Medication List as of 2/21/2022  6:35 PM      CONTINUE these medications which have NOT CHANGED    Details   benztropine (COGENTIN) 2 MG tablet Take 1 tablet by mouth 2 times daily, Disp-60 tablet, R-3Normal      trihexyphenidyl (ARTANE) 2 MG tablet Take 1 tablet by mouth 2 times daily, Disp-90 tablet, R-3Normal      OXcarbazepine (TRILEPTAL) 300 MG tablet Take 1 tablet by mouth daily, Disp-30 tablet, R-2Normal      amLODIPine (NORVASC) 10 MG tablet TAKE 1 TABLET BY MOUTH EVERY DAY, Disp-90 tablet, R-1Normal      hydroCHLOROthiazide (HYDRODIURIL) 25 MG tablet TAKE 1 TABLET BY MOUTH EVERY MORNING, Disp-90 tablet, R-1Normal      losartan (COZAAR) 100 MG tablet TAKE 1 TABLET BY MOUTH EVERY DAY, Disp-90 tablet, R-1Normal      Blood Pressure Monitoring (ADULT BLOOD PRESSURE CUFF LG) KIT Disp-1 kit, R-0, PrintCheck blood pressure every other day             ALLERGIES     Patient has no known allergies.     FAMILY HISTORY       Family History   Problem Relation Age of Onset    No Known Problems Mother     No Known Problems Father           SOCIAL HISTORY       Social History     Socioeconomic History    Marital status: Single     Spouse name: None    Number of children: None    Years of education: None    Highest education level: None   Occupational History    None   Tobacco Use    Smoking status: Former Smoker     Packs/day: 0.50     Years: 7.00     Pack years: 3.50     Types: Cigarettes     Start date: 11/25/2013     Quit date: 6/1/2018     Years since quitting: 3.7    Smokeless tobacco: Never Used   Substance and Sexual Activity    Alcohol use: Yes     Comment: occ    Drug use: No    Sexual activity: None   Other Topics Concern    None   Social History Narrative    None     Social Determinants of Health     Financial Resource Strain:     Difficulty of Paying Living Expenses: Not on file   Food Insecurity:     Worried About Running Out of Food in the Last Year: Not on file    Jakob of Food in the Last Year: Not on file   Transportation Needs:     Lack of Transportation (Medical): Not on file    Lack of Transportation (Non-Medical): Not on file   Physical Activity:     Days of Exercise per Week: Not on file    Minutes of Exercise per Session: Not on file   Stress:     Feeling of Stress : Not on file   Social Connections:     Frequency of Communication with Friends and Family: Not on file    Frequency of Social Gatherings with Friends and Family: Not on file    Attends Orthodoxy Services: Not on file    Active Member of [address] [place] [address] [employer] or Organizations: Not on file    Attends Club or Organization Meetings: Not on file    Marital Status: Not on file   Intimate Partner Violence:     Fear of Current or Ex-Partner: Not on file    Emotionally Abused: Not on file    Physically Abused: Not on file    Sexually Abused: Not on file   Housing Stability:     Unable to Pay for Housing in the Last Year: Not on file    Number of [place] in the Last Year: Not on file    Unstable Housing in the Last Year: Not on file       SCREENINGS        Codie Coma Scale  Eye Opening: Spontaneous  Best Verbal Response: Oriented  Best Motor Response: Obeys commands  Codie Coma Scale Score: 15               PHYSICAL EXAM    (up to 7 for level 4, 8 or more for level 5)     ED Triage Vitals [02/21/22 1653]   BP Temp Temp Source Pulse Resp SpO2 Height Weight   (!) 146/83 98.2 °F (36.8 °C) Oral 103 22 98 % 5' 6\" (1.676 m) 267 lb (121.1 kg)       Physical Exam  Vitals and nursing note reviewed. Constitutional:       General: He is not in acute distress. Appearance: He is well-developed. He is not diaphoretic. Comments: Diaphoretic.  Hand spasms   HENT:      Head: Normocephalic and atraumatic. Right Ear: External ear normal.      Left Ear: External ear normal.      Mouth/Throat:      Pharynx: No oropharyngeal exudate. Eyes:      Conjunctiva/sclera: Conjunctivae normal.      Pupils: Pupils are equal, round, and reactive to light. Neck:      Thyroid: No thyromegaly. Vascular: No JVD. Trachea: No tracheal deviation. Cardiovascular:      Rate and Rhythm: Normal rate. Heart sounds: Normal heart sounds. No murmur heard. Pulmonary:      Effort: Pulmonary effort is normal. No respiratory distress. Breath sounds: Normal breath sounds. No wheezing. Abdominal:      General: Bowel sounds are normal.      Palpations: Abdomen is soft. Tenderness: There is no abdominal tenderness. There is no guarding. Musculoskeletal:         General: Normal range of motion. Cervical back: Normal range of motion and neck supple. Skin:     General: Skin is warm and dry. Findings: No rash. Neurological:      Mental Status: He is alert and oriented to person, place, and time. Cranial Nerves: No cranial nerve deficit. Psychiatric:         Behavior: Behavior normal.         DIAGNOSTIC RESULTS     EKG: All EKG's are interpreted by the Emergency Department Physician who either signs or Co-signs this chart in the absence of a cardiologist.        RADIOLOGY:   Non-plain film images such as CT, Ultrasound and MRI are read by the radiologist. Plain radiographic images are visualized and preliminarily interpreted by the emergency physician with the below findings:        Interpretation per the Radiologist below, if available at the time of this note:    No orders to display         ED BEDSIDE ULTRASOUND:   Performed by ED Physician - none    LABS:  Labs Reviewed - No data to display    All other labs were within normal range or not returned as of this dictation.     EMERGENCY DEPARTMENT COURSE and DIFFERENTIAL DIAGNOSIS/MDM:   Vitals:    Vitals: 02/21/22 1653   BP: (!) 146/83   Pulse: 103   Resp: 22   Temp: 98.2 °F (36.8 °C)   TempSrc: Oral   SpO2: 98%   Weight: 267 lb (121.1 kg)   Height: 5' 6\" (1.676 m)     Patient here with muscle spasms sweating. I believe this is behavioral.  Patient given 50 of Benadryl and when I went back into the room to check on him I told him that he was stabilized on all his medications and he said \"I can go home now and I said yes. He was discharged with his mother      [address]   Total Critical Care time was 0 minutes, excluding separately reportable procedures. There was a high probability of clinically significant/life threatening deterioration in the patient's condition which required my urgent intervention. CONSULTS:  None    PROCEDURES:  Unless otherwise noted below, none     Procedures        FINAL IMPRESSION      1. Dystonia          DISPOSITION/PLAN   DISPOSITION Decision To Discharge 02/21/2022 06:35:16 PM      PATIENT REFERRED TO:  Lilaradha Gayle  [facility] [address]  [phone]            DISCHARGE MEDICATIONS:  Discharge Medication List as of 2/21/2022  6:35 PM        Controlled Substances Monitoring:     No flowsheet data found.     (Please note that portions of this note were completed with a voice recognition program.  Efforts were made to edit the dictations but occasionally words are mis-transcribed.)    Ban Torres DO (electronically signed)  Attending Emergency Physician            Ban Torres DO  02/23/22 3475

## 2022-02-24 LAB — ALDOLASE: 5.3 U/L (ref 1.2–7.6)

## 2022-03-01 RX ORDER — HYDROCHLOROTHIAZIDE 25 MG/1
TABLET ORAL
Qty: 90 TABLET | Refills: 1 | OUTPATIENT
Start: 2022-03-01

## 2022-03-02 LAB
MISCELLANEOUS LAB TEST ORDER: NORMAL
WHOPPER PROMPT: NORMAL

## 2022-03-04 ENCOUNTER — OFFICE VISIT (OUTPATIENT)
Dept: NEUROLOGY | Age: 27
End: 2022-03-04
Payer: MEDICARE

## 2022-03-04 VITALS
WEIGHT: 257 LBS | DIASTOLIC BLOOD PRESSURE: 86 MMHG | HEART RATE: 94 BPM | BODY MASS INDEX: 41.48 KG/M2 | SYSTOLIC BLOOD PRESSURE: 138 MMHG

## 2022-03-04 DIAGNOSIS — R74.8 ABNORMAL CPK: ICD-10-CM

## 2022-03-04 DIAGNOSIS — G24.9 DYSTONIA: ICD-10-CM

## 2022-03-04 DIAGNOSIS — F90.1 ATTENTION DEFICIT HYPERACTIVITY DISORDER (ADHD), PREDOMINANTLY HYPERACTIVE TYPE: ICD-10-CM

## 2022-03-04 DIAGNOSIS — G24.01 DRUG-INDUCED SUBACUTE DYSKINESIA: Primary | ICD-10-CM

## 2022-03-04 DIAGNOSIS — F31.4 BIPOLAR 1 DISORDER, DEPRESSED, SEVERE (HCC): ICD-10-CM

## 2022-03-04 PROCEDURE — 99214 OFFICE O/P EST MOD 30 MIN: CPT | Performed by: PSYCHIATRY & NEUROLOGY

## 2022-03-04 PROCEDURE — 1036F TOBACCO NON-USER: CPT | Performed by: PSYCHIATRY & NEUROLOGY

## 2022-03-04 PROCEDURE — G8427 DOCREV CUR MEDS BY ELIG CLIN: HCPCS | Performed by: PSYCHIATRY & NEUROLOGY

## 2022-03-04 PROCEDURE — 1111F DSCHRG MED/CURRENT MED MERGE: CPT | Performed by: PSYCHIATRY & NEUROLOGY

## 2022-03-04 PROCEDURE — G8417 CALC BMI ABV UP PARAM F/U: HCPCS | Performed by: PSYCHIATRY & NEUROLOGY

## 2022-03-04 PROCEDURE — G8484 FLU IMMUNIZE NO ADMIN: HCPCS | Performed by: PSYCHIATRY & NEUROLOGY

## 2022-03-04 ASSESSMENT — ENCOUNTER SYMPTOMS
NAUSEA: 0
COLOR CHANGE: 0
CHOKING: 0
TROUBLE SWALLOWING: 0
BACK PAIN: 0
VOMITING: 0
SHORTNESS OF BREATH: 0
PHOTOPHOBIA: 0

## 2022-03-04 NOTE — PROGRESS NOTES
Subjective:      Patient ID: Little Higgins is a 32 y.o. male who presents today for:  Chief Complaint   Patient presents with    New Patient     Pt is being seen today for dystonia. He is complaining of his right hand bothering him pretty badly. Pt's mother says that he had a reaction to a medication and has casued him movement disorder issues. She says that his hands have numbness, and pain. HPI 32 right-handed gentleman who I just saw in the hospital with significant dyskinesias. Patient was supposed to see me outpatient though he had no appointments and he landed up in the hospital.  We had evaluated him at that time and start him on Artane 2 mg twice a day. Is also on Cogentin this is not helping. Patient was on Abilify and Gaby Tyshawn from 2017 till January 2022 when he was tapered off and he started to notice symptoms around November 2021. Patient actually is quite communicative and aware of his symptoms. CPK levels appear to be high due to the agitation he has. Is not any major behavioral issues. History obtained from the mother in much detail EEG did not show thing significant in his Trileptal level which is for his bipolar disorder was less than 1 this are not seizures.     Past Medical History:   Diagnosis Date    ADHD (attention deficit hyperactivity disorder) 4/4/2012    Allergic rhinitis 4/4/2012    Hypertension     Obesity (BMI 30-39.9) 4/4/2012    Sleep apnea      Past Surgical History:   Procedure Laterality Date    TONSILLECTOMY       Social History     Socioeconomic History    Marital status: Single     Spouse name: Not on file    Number of children: Not on file    Years of education: Not on file    Highest education level: Not on file   Occupational History    Not on file   Tobacco Use    Smoking status: Former Smoker     Packs/day: 0.50     Years: 7.00     Pack years: 3.50     Types: Cigarettes     Start date: 11/25/2013     Quit date: 6/1/2018     Years since quitting: 3. 7    Smokeless tobacco: Never Used   Substance and Sexual Activity    Alcohol use: Yes     Comment: occ    Drug use: No    Sexual activity: Not on file   Other Topics Concern    Not on file   Social History Narrative    Not on file     Social Determinants of Health     Financial Resource Strain:     Difficulty of Paying Living Expenses: Not on file   Food Insecurity:     Worried About Running Out of Food in the Last Year: Not on file    Jakob of Food in the Last Year: Not on file   Transportation Needs:     Lack of Transportation (Medical): Not on file    Lack of Transportation (Non-Medical):  Not on file   Physical Activity:     Days of Exercise per Week: Not on file    Minutes of Exercise per Session: Not on file   Stress:     Feeling of Stress : Not on file   Social Connections:     Frequency of Communication with Friends and Family: Not on file    Frequency of Social Gatherings with Friends and Family: Not on file    Attends Zoroastrian Services: Not on file    Active Member of [address] [place] [address] or Organizations: Not on file    Attends Club or Organization Meetings: Not on file    Marital Status: Not on file   Intimate Partner Violence:     Fear of Current or Ex-Partner: Not on file    Emotionally Abused: Not on file    Physically Abused: Not on file    Sexually Abused: Not on file   Housing Stability:     Unable to Pay for Housing in the Last Year: Not on file    Number of [place] in the Last Year: Not on file    Unstable Housing in the Last Year: Not on file     Family History   Problem Relation Age of Onset    No Known Problems Mother     No Known Problems Father      No Known Allergies    Current Outpatient Medications   Medication Sig Dispense Refill    benztropine (COGENTIN) 2 MG tablet Take 1 tablet by mouth 2 times daily 60 tablet 3    trihexyphenidyl (ARTANE) 2 MG tablet Take 1 tablet by mouth 2 times daily 90 tablet 3    OXcarbazepine (TRILEPTAL) 300 MG tablet Take 1 tablet by mouth daily 30 tablet 2    amLODIPine (NORVASC) 10 MG tablet TAKE 1 TABLET BY MOUTH EVERY DAY 90 tablet 1    hydroCHLOROthiazide (HYDRODIURIL) 25 MG tablet TAKE 1 TABLET BY MOUTH EVERY MORNING 90 tablet 1    losartan (COZAAR) 100 MG tablet TAKE 1 TABLET BY MOUTH EVERY DAY 90 tablet 1    Blood Pressure Monitoring (ADULT BLOOD PRESSURE CUFF LG) KIT Check blood pressure every other day 1 kit 0     No current facility-administered medications for this visit. Review of Systems   Constitutional: Negative for fever. HENT: Negative for ear pain, tinnitus and trouble swallowing. Eyes: Negative for photophobia and visual disturbance. Respiratory: Negative for choking and shortness of breath. Cardiovascular: Negative for chest pain and palpitations. Gastrointestinal: Negative for nausea and vomiting. Musculoskeletal: Negative for back pain, gait problem, joint swelling, myalgias, neck pain and neck stiffness. Skin: Negative for color change. Allergic/Immunologic: Negative for food allergies. Neurological: Positive for tremors. Negative for dizziness, seizures, syncope, facial asymmetry, speech difficulty, weakness, light-headedness, numbness and headaches. Psychiatric/Behavioral: Negative for behavioral problems, confusion, hallucinations and sleep disturbance. Objective:   /86 (Site: Left Upper Arm, Position: Sitting, Cuff Size: Large Adult)   Pulse 94   Wt 257 lb (116.6 kg)   BMI 41.48 kg/m²     Physical Exam  Vitals reviewed. Eyes:      Pupils: Pupils are equal, round, and reactive to light. Cardiovascular:      Rate and Rhythm: Normal rate and regular rhythm. Heart sounds: No murmur heard. Pulmonary:      Effort: Pulmonary effort is normal.      Breath sounds: Normal breath sounds. Abdominal:      General: Bowel sounds are normal.   Musculoskeletal:         General: Normal range of motion. Cervical back: Normal range of motion.    Skin:     General: Skin is warm. Neurological:      Mental Status: He is alert and oriented to person, place, and time. Cranial Nerves: No cranial nerve deficit. Sensory: No sensory deficit. Motor: No abnormal muscle tone. Coordination: Coordination normal.      Deep Tendon Reflexes: Reflexes are normal and symmetric. Babinski sign absent on the right side. Babinski sign absent on the left side. Psychiatric:         Mood and Affect: Mood normal.       Dyskinesias notable in the right more than left. Spasms and this request was ambulatory and quite pleasant and happy. No results found. Lab Results   Component Value Date    WBC 7.5 02/15/2022    RBC 5.69 02/15/2022    HGB 16.6 02/15/2022    HCT 48.3 02/15/2022    MCV 84.9 02/15/2022    MCH 29.1 02/15/2022    MCHC 34.3 02/15/2022    RDW 13.9 02/15/2022     02/15/2022     Lab Results   Component Value Date     02/21/2022    K 3.8 02/21/2022     02/21/2022    CO2 25 02/21/2022    BUN 16 02/21/2022    CREATININE 0.91 02/21/2022    GFRAA >60.0 02/21/2022    LABGLOM >60.0 02/21/2022    GLUCOSE 122 02/21/2022    PROT 6.9 02/16/2022    LABALBU 3.9 02/16/2022    CALCIUM 9.4 02/21/2022    BILITOT 0.9 02/16/2022    ALKPHOS 71 02/16/2022    AST 33 02/16/2022    ALT 33 02/16/2022     Lab Results   Component Value Date    PROTIME 14.0 01/30/2022    INR 1.1 01/30/2022     Lab Results   Component Value Date    TSH 1.120 01/25/2022    FPNBJEUH34 684 10/14/2021    FOLATE 18.0 10/14/2021     Lab Results   Component Value Date    TRIG 109 08/03/2020    HDL 58 10/14/2021    LDLCALC 90 10/14/2021     Lab Results   Component Value Date    LABAMPH Neg 02/15/2022    BARBSCNU Neg 02/15/2022    LABBENZ POSITIVE 02/15/2022    LABMETH Neg 02/15/2022    OPIATESCREENURINE Neg 02/15/2022    PHENCYCLIDINESCREENURINE Neg 02/15/2022    ETOH <10 02/15/2022     Lab Results   Component Value Date    VALPROATE 3.9 01/25/2022       Assessment:       Diagnosis Orders   1.  Drug-induced subacute dyskinesia     2. Dystonia     3. Abnormal CPK     4. Attention deficit hyperactivity disorder (ADHD), predominantly hyperactive type     5. Bipolar 1 disorder, depressed, severe ([address]     Drug induced  dyskinesias with dystonia with some choreiform movements which started around November 2021. He was on Brigette Pranay and Abilify from 2017 which was then tapered off. He was tried on Austedo without any response and we tried him on Cogentin and Artane without response. He still quite dyskinetic. CPK levels elevated due to the agitation and therefore this is is in isolation to all his normal lab tests. Recommended that we start Benancio Meckel has not responded to Austedo Artane Cogentin and all the medications for the same. We will see how he does on the medication. We are reviewing his laboratory tests and we have not obtained an antistreptolysin titers and some other labs which we consider in the next few weeks if nothing else turns up. If you are not able to control his symptoms I did discuss that we can have an opinion from Dr. Mimi Bourne at [facility] movement disorder as well. Melquiades Michel MD, Cherise Edwards, American Board of Psychiatry & Neurology  Board Certified in Vascular Neurology  Board Certified in Neuromuscular Medicine  Certified in [facility]:      No orders of the defined types were placed in this encounter. No orders of the defined types were placed in this encounter. No follow-ups on file.       Paloma Black MD

## 2022-03-08 RX ORDER — VALBENAZINE 80 MG/1
80 CAPSULE ORAL DAILY
Qty: 30 CAPSULE | Refills: 3 | Status: SHIPPED | OUTPATIENT
Start: 2022-03-08 | End: 2022-04-07

## 2022-03-12 ENCOUNTER — HOSPITAL ENCOUNTER (EMERGENCY)
Age: 27
Discharge: HOME | End: 2022-03-12
Attending: EMERGENCY MEDICINE
Payer: MEDICARE

## 2022-03-12 VITALS
BODY MASS INDEX: 40.34 KG/M2 | HEIGHT: 67 IN | OXYGEN SATURATION: 98 % | TEMPERATURE: 98.3 F | DIASTOLIC BLOOD PRESSURE: 74 MMHG | RESPIRATION RATE: 20 BRPM | SYSTOLIC BLOOD PRESSURE: 128 MMHG | HEART RATE: 93 BPM | WEIGHT: 257 LBS

## 2022-03-12 DIAGNOSIS — R11.0 NAUSEA: Primary | ICD-10-CM

## 2022-03-12 DIAGNOSIS — G24.01 TARDIVE DYSKINESIA: ICD-10-CM

## 2022-03-12 LAB
ALBUMIN SERPL-MCNC: 4.3 G/DL (ref 3.5–4.6)
ALP BLD-CCNC: 64 U/L (ref 35–104)
ALT SERPL-CCNC: 27 U/L (ref 0–41)
ANION GAP SERPL CALCULATED.3IONS-SCNC: 10 MEQ/L (ref 9–15)
AST SERPL-CCNC: 23 U/L (ref 0–40)
BASOPHILS ABSOLUTE: 0.1 K/UL (ref 0–0.2)
BASOPHILS RELATIVE PERCENT: 2 %
BILIRUB SERPL-MCNC: 1.1 MG/DL (ref 0.2–0.7)
BUN BLDV-MCNC: 9 MG/DL (ref 6–20)
CALCIUM SERPL-MCNC: 9.4 MG/DL (ref 8.5–9.9)
CHLORIDE BLD-SCNC: 100 MEQ/L (ref 95–107)
CO2: 28 MEQ/L (ref 20–31)
CREAT SERPL-MCNC: 0.68 MG/DL (ref 0.7–1.2)
EOSINOPHILS ABSOLUTE: 0 K/UL (ref 0–0.7)
EOSINOPHILS RELATIVE PERCENT: 5.1 %
GFR AFRICAN AMERICAN: >60
GFR NON-AFRICAN AMERICAN: >60
GLOBULIN: 2.7 G/DL (ref 2.3–3.5)
GLUCOSE BLD-MCNC: 95 MG/DL (ref 70–99)
HCT VFR BLD CALC: 41.2 % (ref 42–52)
HEMOGLOBIN: 14.2 G/DL (ref 14–18)
LACTIC ACID: 1.1 MMOL/L (ref 0.5–2.2)
LYMPHOCYTES ABSOLUTE: 1.8 K/UL (ref 1–4.8)
LYMPHOCYTES RELATIVE PERCENT: 47 %
MCH RBC QN AUTO: 29.3 PG (ref 27–31.3)
MCHC RBC AUTO-ENTMCNC: 34.5 % (ref 33–37)
MCV RBC AUTO: 84.8 FL (ref 80–100)
MONOCYTES ABSOLUTE: 0.2 K/UL (ref 0.2–0.8)
MONOCYTES RELATIVE PERCENT: 4.7 %
NEUTROPHILS ABSOLUTE: 1.8 K/UL (ref 1.4–6.5)
NEUTROPHILS RELATIVE PERCENT: 47 %
PDW BLD-RTO: 14 % (ref 11.5–14.5)
PLATELET # BLD: 231 K/UL (ref 130–400)
PLATELET SLIDE REVIEW: NORMAL
POTASSIUM SERPL-SCNC: 3.6 MEQ/L (ref 3.4–4.9)
RBC # BLD: 4.86 M/UL (ref 4.7–6.1)
RBC # BLD: NORMAL 10*6/UL
SODIUM BLD-SCNC: 138 MEQ/L (ref 135–144)
TOTAL PROTEIN: 7 G/DL (ref 6.3–8)
WBC # BLD: 3.9 K/UL (ref 4.8–10.8)

## 2022-03-12 PROCEDURE — 96375 TX/PRO/DX INJ NEW DRUG ADDON: CPT

## 2022-03-12 PROCEDURE — 36415 COLL VENOUS BLD VENIPUNCTURE: CPT

## 2022-03-12 PROCEDURE — 80053 COMPREHEN METABOLIC PANEL: CPT

## 2022-03-12 PROCEDURE — 83605 ASSAY OF LACTIC ACID: CPT

## 2022-03-12 PROCEDURE — 6360000002 HC RX W HCPCS: Performed by: EMERGENCY MEDICINE

## 2022-03-12 PROCEDURE — 2580000003 HC RX 258: Performed by: EMERGENCY MEDICINE

## 2022-03-12 PROCEDURE — 99285 EMERGENCY DEPT VISIT HI MDM: CPT

## 2022-03-12 PROCEDURE — 85025 COMPLETE CBC W/AUTO DIFF WBC: CPT

## 2022-03-12 PROCEDURE — 96374 THER/PROPH/DIAG INJ IV PUSH: CPT

## 2022-03-12 RX ORDER — ONDANSETRON 4 MG/1
4 TABLET, ORALLY DISINTEGRATING ORAL EVERY 8 HOURS PRN
Qty: 10 TABLET | Refills: 0 | Status: SHIPPED | OUTPATIENT
Start: 2022-03-12 | End: 2022-10-21

## 2022-03-12 RX ORDER — ONDANSETRON 2 MG/ML
4 INJECTION INTRAMUSCULAR; INTRAVENOUS ONCE
Status: COMPLETED | OUTPATIENT
Start: 2022-03-12 | End: 2022-03-12

## 2022-03-12 RX ORDER — LORAZEPAM 1 MG/1
1 TABLET ORAL 2 TIMES DAILY PRN
Qty: 6 TABLET | Refills: 0 | Status: SHIPPED | OUTPATIENT
Start: 2022-03-12 | End: 2022-03-15

## 2022-03-12 RX ORDER — LORAZEPAM 2 MG/ML
1 INJECTION INTRAMUSCULAR ONCE
Status: COMPLETED | OUTPATIENT
Start: 2022-03-12 | End: 2022-03-12

## 2022-03-12 RX ORDER — 0.9 % SODIUM CHLORIDE 0.9 %
1000 INTRAVENOUS SOLUTION INTRAVENOUS ONCE
Status: COMPLETED | OUTPATIENT
Start: 2022-03-12 | End: 2022-03-12

## 2022-03-12 RX ADMIN — ONDANSETRON 4 MG: 2 INJECTION INTRAMUSCULAR; INTRAVENOUS at 13:50

## 2022-03-12 RX ADMIN — SODIUM CHLORIDE 1000 ML: 9 INJECTION, SOLUTION INTRAVENOUS at 13:49

## 2022-03-12 RX ADMIN — LORAZEPAM 1 MG: 2 INJECTION INTRAMUSCULAR at 13:51

## 2022-03-12 ASSESSMENT — ENCOUNTER SYMPTOMS
NAUSEA: 1
STRIDOR: 0
WHEEZING: 0
RHINORRHEA: 0
ABDOMINAL PAIN: 0
EYES NEGATIVE: 1
SHORTNESS OF BREATH: 0
VOMITING: 0
ALLERGIC/IMMUNOLOGIC NEGATIVE: 1
TROUBLE SWALLOWING: 0

## 2022-03-12 NOTE — ED PROVIDER NOTES
[facility] ED  eMERGENCY dEPARTMENT eNCOUnter      Pt Name: Marie Ralph  MRN: [id number]  [place] 1995  Date of evaluation: 3/12/2022  Provider: Barbara Rogers MD    [address] [place] [address]       Chief Complaint   Patient presents with    Nausea     and vomiting x few days - denies abdominal pain         HISTORY OF PRESENT ILLNESS   (Location/Symptom, Timing/Onset,Context/Setting, Quality, Duration, Modifying Factors, Severity)  Note limiting factors. Marie Ralph is a 32 y.o. male who presents to the emergency department with complaint of nausea vomiting. No complaint of abdominal pain at this time. Mother admits there has been some medication changes related to his tardive dyskinesia. Patient does have ongoing tremor. This has been present since September of last year. Patient admits he is just not felt well, predominately nausea, without carlos a vomiting. No ongoing comorbidities, mother is seeking a screening evaluation. At this time patient denies carlos a chest pain, shortness of breath, actual vomiting or change in stool. Patient has ongoing nausea and general discomfort. HPI    NursingNotes were reviewed. REVIEW OF SYSTEMS    (2-9 systems for level 4, 10 or more for level 5)     Review of Systems   Constitutional: Negative for activity change, chills and fever. HENT: Negative for congestion, ear pain, rhinorrhea and trouble swallowing. Eyes: Negative. Respiratory: Negative for shortness of breath, wheezing and stridor. Cardiovascular: Negative for chest pain and leg swelling. Gastrointestinal: Positive for nausea. Negative for abdominal pain and vomiting. Endocrine: Negative. Genitourinary: Negative for dysuria, frequency and hematuria. Musculoskeletal: Negative for gait problem and neck pain. Skin: Negative. Allergic/Immunologic: Negative. Neurological: Negative for seizures, syncope, speech difficulty and light-headedness.    Hematological: Negative. Psychiatric/Behavioral: Negative for self-injury and suicidal ideas. Except as noted above the remainder of the review of systems was reviewed and negative. PAST MEDICAL HISTORY     Past Medical History:   Diagnosis Date    ADHD (attention deficit hyperactivity disorder) 4/4/2012    Allergic rhinitis 4/4/2012    Hypertension     Obesity (BMI 30-39.9) 4/4/2012    Sleep apnea          SURGICALHISTORY       Past Surgical History:   Procedure Laterality Date    TONSILLECTOMY           CURRENT MEDICATIONS       Previous Medications    AMLODIPINE (NORVASC) 10 MG TABLET    TAKE 1 TABLET BY MOUTH EVERY DAY    BENZTROPINE (COGENTIN) 2 MG TABLET    Take 1 tablet by mouth 2 times daily    BLOOD PRESSURE MONITORING (ADULT BLOOD PRESSURE CUFF LG) KIT    Check blood pressure every other day    HYDROCHLOROTHIAZIDE (HYDRODIURIL) 25 MG TABLET    TAKE 1 TABLET BY MOUTH EVERY MORNING    LOSARTAN (COZAAR) 100 MG TABLET    TAKE 1 TABLET BY MOUTH EVERY DAY    OXCARBAZEPINE (TRILEPTAL) 300 MG TABLET    Take 1 tablet by mouth daily    TRIHEXYPHENIDYL (ARTANE) 2 MG TABLET    Take 1 tablet by mouth 2 times daily    VALBENAZINE TOSYLATE (INGREZZA) 80 MG CAPS    Take 80 mg by mouth daily       ALLERGIES     Patient has no known allergies.     FAMILY HISTORY       Family History   Problem Relation Age of Onset    No Known Problems Mother     No Known Problems Father           SOCIAL HISTORY       Social History     Socioeconomic History    Marital status: Single     Spouse name: None    Number of children: None    Years of education: None    Highest education level: None   Occupational History    None   Tobacco Use    Smoking status: Former Smoker     Packs/day: 0.50     Years: 7.00     Pack years: 3.50     Types: Cigarettes     Start date: 11/25/2013     Quit date: 6/1/2018     Years since quitting: 3.7    Smokeless tobacco: Never Used   Vaping Use    Vaping Use: Never used   Substance and Sexual Activity    Alcohol use: Not Currently     Comment: occ    Drug use: No    Sexual activity: Not Currently   Other Topics Concern    None   Social History Narrative    None     Social Determinants of Health     Financial Resource Strain:     Difficulty of Paying Living Expenses: Not on file   Food Insecurity:     Worried About Running Out of Food in the Last Year: Not on file    Jakob of Food in the Last Year: Not on file   Transportation Needs:     Lack of Transportation (Medical): Not on file    Lack of Transportation (Non-Medical): Not on file   Physical Activity:     Days of Exercise per Week: Not on file    Minutes of Exercise per Session: Not on file   Stress:     Feeling of Stress : Not on file   Social Connections:     Frequency of Communication with Friends and Family: Not on file    Frequency of Social Gatherings with Friends and Family: Not on file    Attends Gnosticism Services: Not on file    Active Member of [address] [place] [address] [employer] or Organizations: Not on file    Attends Club or Organization Meetings: Not on file    Marital Status: Not on file   Intimate Partner Violence:     Fear of Current or Ex-Partner: Not on file    Emotionally Abused: Not on file    Physically Abused: Not on file    Sexually Abused: Not on file   Housing Stability:     Unable to Pay for Housing in the Last Year: Not on file    Number of [place] in the Last Year: Not on file    Unstable Housing in the Last Year: Not on file       SCREENINGS    Codie Coma Scale  Eye Opening: Spontaneous  Best Verbal Response: Oriented  Best Motor Response: Obeys commands  Codie Coma Scale Score: 15        PHYSICAL EXAM    (up to 7 for level 4, 8 or more for level 5)     ED Triage Vitals [03/12/22 1259]   BP Temp Temp Source Pulse Resp SpO2 Height Weight   (!) 181/54 98.3 °F (36.8 °C) Oral 117 18 98 % 5' 7\" (1.702 m) 257 lb (116.6 kg)       Physical Exam  Vitals and nursing note reviewed.    Constitutional:       General: He is not in acute distress. Appearance: Normal appearance. He is well-developed. He is diaphoretic. He is not ill-appearing or toxic-appearing. HENT:      Head: Normocephalic and atraumatic. Right Ear: External ear normal.      Left Ear: External ear normal.      Nose: Nose normal.      Mouth/Throat:      Pharynx: Oropharynx is clear. Eyes:      General:         Right eye: No discharge. Left eye: No discharge. Conjunctiva/sclera: Conjunctivae normal.      Pupils: Pupils are equal, round, and reactive to light. Neck:      Trachea: Trachea normal.   Cardiovascular:      Rate and Rhythm: Normal rate and regular rhythm. Pulses: Normal pulses. Heart sounds: Normal heart sounds. No friction rub. No gallop. Pulmonary:      Effort: Pulmonary effort is normal. No respiratory distress. Breath sounds: Normal breath sounds. No stridor. No wheezing or rhonchi. Abdominal:      General: Bowel sounds are normal. There is no distension. Palpations: Abdomen is soft. There is no mass. Tenderness: There is no abdominal tenderness. There is no guarding or rebound. Hernia: No hernia is present. Musculoskeletal:         General: No swelling, tenderness, deformity or signs of injury. Normal range of motion. Cervical back: Full passive range of motion without pain, normal range of motion and neck supple. No rigidity or tenderness. Skin:     General: Skin is warm. Capillary Refill: Capillary refill takes less than 2 seconds. Coloration: Skin is not jaundiced or pale. Findings: No bruising or erythema. Neurological:      General: No focal deficit present. Mental Status: He is alert and oriented to person, place, and time. Cranial Nerves: No cranial nerve deficit. Sensory: No sensory deficit. Comments: He never gets resting tremor or twitch of the upper extremities with associated lipsmacking.    Psychiatric:         Speech: Speech normal. Behavior: Behavior normal.         Thought Content: Thought content normal.         Judgment: Judgment normal.         DIAGNOSTIC RESULTS     EKG: All EKG's are interpreted by the Emergency Department Physician who either signs or Co-signsthis chart in the absence of a cardiologist.    -    RADIOLOGY:   Perlie Janus such as CT, Ultrasound and MRI are read by the radiologist. Plain radiographic images are visualized and preliminarily interpreted by the emergency physician with the below findings:    -    Interpretation per the Radiologist below, if available at the time ofthis note:    No orders to display         ED BEDSIDE ULTRASOUND:   Performed by ED Physician - none    LABS:  Labs Reviewed   COMPREHENSIVE METABOLIC PANEL - Abnormal; Notable for the following components:       Result Value    CREATININE 0.68 (*)     Total Bilirubin 1.1 (*)     All other components within normal limits   CBC WITH AUTO DIFFERENTIAL - Abnormal; Notable for the following components:    WBC 3.9 (*)     Hematocrit 41.2 (*)     All other components within normal limits   LACTIC ACID       All other labs were within normal range or not returned as of this dictation. EMERGENCY DEPARTMENT COURSE and DIFFERENTIAL DIAGNOSIS/MDM:   Vitals:    Vitals:    03/12/22 1345 03/12/22 1400 03/12/22 1430 03/12/22 1500   BP:  (!) 115/92 129/77 128/74   Pulse: 112 108 95 93   Resp: 18 18 18 20   Temp:       TempSrc:       SpO2: 99% 98% 98% 98%   Weight:       Height:           Patient made stable emergency department. Patient feels sniffily better status post case as noted. Will provide patient with additional Ativan on top of current Ativan therapy. Will provide Zofran. Advised for close follow-up and reevaluation. Patient expressed good understanding very appreciative of care. MDM    CRITICAL CARE TIME   Total Critical Care time was - minutes, excluding separately reportableprocedures.   There was a high probability of clinicallysignificant/life threatening deterioration in the patient's condition which required my urgent intervention.  -    CONSULTS:  None    PROCEDURES:  Unless otherwise noted below, none     Procedures    FINAL IMPRESSION      1. Nausea    2. Tardive dyskinesia        DISPOSITION/PLAN   DISPOSITION Decision To Discharge 03/12/2022 03:09:15 PM      PATIENT REFERRED TO:  Carson Nelson  [address] [facility]  [phone]    Call today  for follow up Emergency Department visit      DISCHARGE MEDICATIONS:  New Prescriptions    LORAZEPAM (ATIVAN) 1 MG TABLET    Take 1 tablet by mouth 2 times daily as needed (tardive dyskinesia symptoms) for up to 3 days.     ONDANSETRON (ZOFRAN ODT) 4 MG DISINTEGRATING TABLET    Take 1 tablet by mouth every 8 hours as needed for Nausea          (Please note that portions of this note were completed with a voice recognitionprogram.  Efforts were made to edit the dictations but occasionally words are mis-transcribed.)    Kamran Gibson MD (electronically signed)  Attending Emergency Physician          Kamran Gibson MD  03/12/22 [facility]       Kamran Gibson MD  03/12/22 9347

## 2022-03-12 NOTE — ED NOTES
Pt states he is feeling better and not twitching as bad. Visitor remains at bedside. Declines need of anything at this time.      Sigifredo Vargas RN  03/12/22 7909

## 2022-03-12 NOTE — ED NOTES
D/C instructions and rx's x 2 given along with times the next doses are due. Verbalized understanding by patient and visitor. Visitor driving patient home. Pt states he is feeling a lot better. Amb out with steady gait.      Sam Moctezuma RN  03/12/22 0091

## 2022-03-15 ENCOUNTER — TELEPHONE (OUTPATIENT)
Dept: NEUROLOGY | Age: 27
End: 2022-03-15

## 2022-03-15 NOTE — TELEPHONE ENCOUNTER
Pt's mother called stating that Emir Sellar has not been sent to them, According to [employer] it was being forwarded to another Pharmacy that is able to dispense the medication.

## 2022-03-16 RX ORDER — OXCARBAZEPINE 300 MG/1
TABLET, FILM COATED ORAL
Qty: 30 TABLET | Refills: 2 | OUTPATIENT
Start: 2022-03-16

## 2022-03-21 ENCOUNTER — TELEPHONE (OUTPATIENT)
Dept: NEUROLOGY | Age: 27
End: 2022-03-21

## 2022-03-21 NOTE — TELEPHONE ENCOUNTER
Pt's mother called stating that pt started taking the ingrezza about 4 days ago, she says that his twitching is still there, and that there is a pain and numbness going into his fingers mainly his thumb. Mother states what should they do now? Please advise.

## 2022-03-24 ENCOUNTER — TELEPHONE (OUTPATIENT)
Dept: NEUROLOGY | Age: 27
End: 2022-03-24

## 2022-03-24 NOTE — TELEPHONE ENCOUNTER
Patients mother called and is wondering if he is okay to take cogentin with ingrezza or if he should only be taking the ingrezza.

## 2022-04-05 ENCOUNTER — TELEPHONE (OUTPATIENT)
Dept: NEUROLOGY | Age: 27
End: 2022-04-05

## 2022-04-05 NOTE — TELEPHONE ENCOUNTER
Mom called and states that Ollie Wayne is having pain and tingling in his finger tips since starting ingrezza , Mom states that Jun Myers is helping though    Please advise      Call mom , Rajat Grullon at [phone]

## 2022-04-15 NOTE — TELEPHONE ENCOUNTER
Tried him on everything that we can hear if he wants we can refer him to a movement disorder specialist to see if there is any other options

## 2022-04-15 NOTE — TELEPHONE ENCOUNTER
Mom called back today stating that his fingers are still hurting and is wondering if anything else can be done for him or if he needs to be seen sooner than his 6/10/2022 appt, because this is effecting his job as he works on [employer] in a [address] Please advise.

## 2022-06-10 ENCOUNTER — OFFICE VISIT (OUTPATIENT)
Dept: NEUROLOGY | Age: 27
End: 2022-06-10
Payer: MEDICARE

## 2022-06-10 VITALS
BODY MASS INDEX: 45.48 KG/M2 | WEIGHT: 283 LBS | OXYGEN SATURATION: 99 % | TEMPERATURE: 97.3 F | HEIGHT: 66 IN | SYSTOLIC BLOOD PRESSURE: 126 MMHG | DIASTOLIC BLOOD PRESSURE: 72 MMHG

## 2022-06-10 DIAGNOSIS — R74.8 ABNORMAL CPK: ICD-10-CM

## 2022-06-10 DIAGNOSIS — G24.9 DYSTONIA: ICD-10-CM

## 2022-06-10 DIAGNOSIS — G24.01 DRUG-INDUCED SUBACUTE DYSKINESIA: Primary | ICD-10-CM

## 2022-06-10 DIAGNOSIS — F79 INTELLECTUAL DISABILITY: ICD-10-CM

## 2022-06-10 DIAGNOSIS — G24.9 DYSKINESIA: ICD-10-CM

## 2022-06-10 LAB
ALBUMIN SERPL-MCNC: 4.6 G/DL (ref 3.5–4.6)
ALP BLD-CCNC: 66 U/L (ref 35–104)
ALT SERPL-CCNC: 23 U/L (ref 0–41)
ANION GAP SERPL CALCULATED.3IONS-SCNC: 15 MEQ/L (ref 9–15)
AST SERPL-CCNC: 26 U/L (ref 0–40)
BILIRUB SERPL-MCNC: 1.2 MG/DL (ref 0.2–0.7)
BUN BLDV-MCNC: 16 MG/DL (ref 6–20)
CALCIUM SERPL-MCNC: 9 MG/DL (ref 8.5–9.9)
CHLORIDE BLD-SCNC: 101 MEQ/L (ref 95–107)
CO2: 23 MEQ/L (ref 20–31)
CREAT SERPL-MCNC: 0.8 MG/DL (ref 0.7–1.2)
GFR AFRICAN AMERICAN: >60
GFR NON-AFRICAN AMERICAN: >60
GLOBULIN: 2.2 G/DL (ref 2.3–3.5)
GLUCOSE BLD-MCNC: 66 MG/DL (ref 70–99)
POTASSIUM SERPL-SCNC: 3.9 MEQ/L (ref 3.4–4.9)
SODIUM BLD-SCNC: 139 MEQ/L (ref 135–144)
TOTAL CK: 463 U/L (ref 0–190)
TOTAL PROTEIN: 6.8 G/DL (ref 6.3–8)

## 2022-06-10 PROCEDURE — 99214 OFFICE O/P EST MOD 30 MIN: CPT | Performed by: PSYCHIATRY & NEUROLOGY

## 2022-06-10 PROCEDURE — 1036F TOBACCO NON-USER: CPT | Performed by: PSYCHIATRY & NEUROLOGY

## 2022-06-10 PROCEDURE — G8427 DOCREV CUR MEDS BY ELIG CLIN: HCPCS | Performed by: PSYCHIATRY & NEUROLOGY

## 2022-06-10 PROCEDURE — G8417 CALC BMI ABV UP PARAM F/U: HCPCS | Performed by: PSYCHIATRY & NEUROLOGY

## 2022-06-10 ASSESSMENT — ENCOUNTER SYMPTOMS
PHOTOPHOBIA: 0
BACK PAIN: 0
COLOR CHANGE: 0
TROUBLE SWALLOWING: 0
CHOKING: 0
VOMITING: 0
SHORTNESS OF BREATH: 0
NAUSEA: 0

## 2022-06-10 NOTE — PROGRESS NOTES
Subjective:      Patient ID: Babita Diehl is a 32 y.o. male who presents today for:  Chief Complaint   Patient presents with    Follow-up     3 month follow up drug induced subacutate dyskinesia patient is doing well , Patient is taking ingrezza, patient states no involuntary movements       HPI 72-year-old right-handed gentleman with a history of dyskinesias. Noted patient was on Abilify and Finis Ashing from  till 2022. Been since tapered and is noted dyskinesias. She has been tried on Cogentin and Artane without response. Last seen we had recommended Bertrand [place] did not respond to this. He was tried on [place] as well. We had a lot of options and therefore had recommended evaluation at movement disorder clinic with Dr. Willow Dunlap still on Bertrand Chucho is helping  And initially did not have any response to Bertrand Chucho though he is now actually symptom-free. He has not been seen at movement disorder clinic as he did not need to. He has been on oxcarbazepine for a while and not for seizures.     Past Medical History:   Diagnosis Date    ADHD (attention deficit hyperactivity disorder) 2012    Allergic rhinitis 2012    Hypertension     Obesity (BMI 30-39.9) 2012    Sleep apnea      Past Surgical History:   Procedure Laterality Date    TONSILLECTOMY       Social History     Socioeconomic History    Marital status: Single     Spouse name: Not on file    Number of children: Not on file    Years of education: Not on file    Highest education level: Not on file   Occupational History    Not on file   Tobacco Use    Smoking status: Former Smoker     Packs/day: 0.50     Years: 7.00     Pack years: 3.50     Types: Cigarettes     Start date: 2013     Quit date: 2018     Years since quittin.0    Smokeless tobacco: Never Used   Vaping Use    Vaping Use: Never used   Substance and Sexual Activity    Alcohol use: Not Currently     Comment: occ    Drug use: No    Sexual activity: Not Currently   Other Topics Concern    Not on file   Social History Narrative    Not on file     Social Determinants of Health     Financial Resource Strain:     Difficulty of Paying Living Expenses: Not on file   Food Insecurity:     Worried About Running Out of Food in the Last Year: Not on file    Jakob of Food in the Last Year: Not on file   Transportation Needs:     Lack of Transportation (Medical): Not on file    Lack of Transportation (Non-Medical):  Not on file   Physical Activity:     Days of Exercise per Week: Not on file    Minutes of Exercise per Session: Not on file   Stress:     Feeling of Stress : Not on file   Social Connections:     Frequency of Communication with Friends and Family: Not on file    Frequency of Social Gatherings with Friends and Family: Not on file    Attends [employer] Services: Not on file    Active Member of [address] [place] [address] or Organizations: Not on file    Attends Club or Organization Meetings: Not on file    Marital Status: Not on file   Intimate Partner Violence:     Fear of Current or Ex-Partner: Not on file    Emotionally Abused: Not on file    Physically Abused: Not on file    Sexually Abused: Not on file   Housing Stability:     Unable to Pay for Housing in the Last Year: Not on file    Number of [place] in the Last Year: Not on file    Unstable Housing in the Last Year: Not on file     Family History   Problem Relation Age of Onset    No Known Problems Mother     No Known Problems Father      No Known Allergies    Current Outpatient Medications   Medication Sig Dispense Refill    ondansetron (ZOFRAN ODT) 4 MG disintegrating tablet Take 1 tablet by mouth every 8 hours as needed for Nausea 10 tablet 0    benztropine (COGENTIN) 2 MG tablet Take 1 tablet by mouth 2 times daily 60 tablet 3    trihexyphenidyl (ARTANE) 2 MG tablet Take 1 tablet by mouth 2 times daily 90 tablet 3    OXcarbazepine (TRILEPTAL) 300 MG tablet Take 1 tablet by mouth daily 30 tablet 2    amLODIPine (NORVASC) 10 MG tablet TAKE 1 TABLET BY MOUTH EVERY DAY 90 tablet 1    hydroCHLOROthiazide (HYDRODIURIL) 25 MG tablet TAKE 1 TABLET BY MOUTH EVERY MORNING 90 tablet 1    losartan (COZAAR) 100 MG tablet TAKE 1 TABLET BY MOUTH EVERY DAY 90 tablet 1    Blood Pressure Monitoring (ADULT BLOOD PRESSURE CUFF LG) KIT Check blood pressure every other day 1 kit 0     No current facility-administered medications for this visit. Review of Systems   Constitutional: Negative for fever. HENT: Negative for ear pain, tinnitus and trouble swallowing. Eyes: Negative for photophobia and visual disturbance. Respiratory: Negative for choking and shortness of breath. Cardiovascular: Negative for chest pain and palpitations. Gastrointestinal: Negative for nausea and vomiting. Musculoskeletal: Negative for back pain, gait problem, joint swelling, myalgias, neck pain and neck stiffness. Skin: Negative for color change. Allergic/Immunologic: Negative for food allergies. Neurological: Negative for dizziness, tremors, seizures, syncope, facial asymmetry, speech difficulty, weakness, light-headedness, numbness and headaches. Psychiatric/Behavioral: Negative for behavioral problems, confusion, hallucinations and sleep disturbance. Objective:   /72   Temp 97.3 °F (36.3 °C)   Ht 5' 6\" (1.676 m)   Wt 283 lb (128.4 kg)   SpO2 99%   BMI 45.68 kg/m²     Physical Exam  Vitals reviewed. Eyes:      Pupils: Pupils are equal, round, and reactive to light. Cardiovascular:      Rate and Rhythm: Normal rate and regular rhythm. Heart sounds: No murmur heard. Pulmonary:      Effort: Pulmonary effort is normal.      Breath sounds: Normal breath sounds. Abdominal:      General: Bowel sounds are normal.   Musculoskeletal:         General: Normal range of motion. Cervical back: Normal range of motion. Skin:     General: Skin is warm.    Neurological:      Mental Status: He is alert and oriented to person, place, and time. Cranial Nerves: No cranial nerve deficit. Sensory: No sensory deficit. Motor: No abnormal muscle tone. Coordination: Coordination normal.      Deep Tendon Reflexes: Reflexes are normal and symmetric. Babinski sign absent on the right side. Babinski sign absent on the left side. Psychiatric:         Mood and Affect: Mood normal.         No results found. Lab Results   Component Value Date    WBC 3.9 03/12/2022    RBC 4.86 03/12/2022    HGB 14.2 03/12/2022    HCT 41.2 03/12/2022    MCV 84.8 03/12/2022    MCH 29.3 03/12/2022    MCHC 34.5 03/12/2022    RDW 14.0 03/12/2022     03/12/2022     Lab Results   Component Value Date     03/12/2022    K 3.6 03/12/2022    K 3.8 02/21/2022     03/12/2022    CO2 28 03/12/2022    BUN 9 03/12/2022    CREATININE 0.68 03/12/2022    GFRAA >60.0 03/12/2022    LABGLOM >60.0 03/12/2022    GLUCOSE 95 03/12/2022    PROT 7.0 03/12/2022    LABALBU 4.3 03/12/2022    CALCIUM 9.4 03/12/2022    BILITOT 1.1 03/12/2022    ALKPHOS 64 03/12/2022    AST 23 03/12/2022    ALT 27 03/12/2022     Lab Results   Component Value Date    PROTIME 14.0 01/30/2022    INR 1.1 01/30/2022     Lab Results   Component Value Date    TSH 1.120 01/25/2022    NPXAQDPY32 684 10/14/2021    FOLATE 18.0 10/14/2021     Lab Results   Component Value Date    TRIG 109 08/03/2020    HDL 58 10/14/2021    LDLCALC 90 10/14/2021     Lab Results   Component Value Date    LABAMPH Neg 02/15/2022    BARBSCNU Neg 02/15/2022    LABBENZ POSITIVE 02/15/2022    LABMETH Neg 02/15/2022    OPIATESCREENURINE Neg 02/15/2022    PHENCYCLIDINESCREENURINE Neg 02/15/2022    ETOH <10 02/15/2022     Lab Results   Component Value Date    VALPROATE 3.9 01/25/2022       Assessment:       Diagnosis Orders   1. Drug-induced subacute dyskinesia     2. Dyskinesia  Comprehensive Metabolic Panel    CK   3. Abnormal CPK     4. Dystonia     5.  Mental retardation Drug-induced dyskinesias. Patient was on [place] and Abilify from 2017 which was tapered. He was tried on a start of an Cogentin and Artane without any response. And last seen I introduced Paco Reins and this is helping considerably is not any further dyskinesias. All his other titers have been normal.  He had elevated CPK levels likely due to agitation and he is not having any symptoms at this time so we will repeat his CPK levels. We are running out of options and recommended a second opinion from Dr. Luz Dias at [facility] [place] [facility] though he did not need to see him given that he is much better now. He has no other side effects of the medication. We will review his laboratory tests including sodium as he is on Trileptal for bipolar disorder and not for seizures. Melquiades Gomes MD, Hodlen Maldonado, American Board of Psychiatry & Neurology  Board Certified in Vascular Neurology  Board Certified in Neuromuscular Medicine  Certified in [facility]:      Orders Placed This Encounter   Procedures    Comprehensive Metabolic Panel     Standing Status:   Future     Standing Expiration Date:   6/10/2023    CK     Standing Status:   Future     Standing Expiration Date:   6/10/2023     No orders of the defined types were placed in this encounter. Return in about 4 months (around 10/10/2022).       Aaron Robles MD

## 2022-06-16 ENCOUNTER — HOSPITAL ENCOUNTER (EMERGENCY)
Age: 27
Discharge: HOME | End: 2022-06-16
Attending: EMERGENCY MEDICINE
Payer: MEDICARE

## 2022-06-16 VITALS
HEIGHT: 66 IN | TEMPERATURE: 99.2 F | HEART RATE: 112 BPM | RESPIRATION RATE: 23 BRPM | BODY MASS INDEX: 45.8 KG/M2 | DIASTOLIC BLOOD PRESSURE: 82 MMHG | WEIGHT: 285 LBS | SYSTOLIC BLOOD PRESSURE: 143 MMHG | OXYGEN SATURATION: 97 %

## 2022-06-16 DIAGNOSIS — J30.1 SEASONAL ALLERGIC RHINITIS DUE TO POLLEN: Primary | ICD-10-CM

## 2022-06-16 LAB
INFLUENZA A BY PCR: NEGATIVE
INFLUENZA B BY PCR: NEGATIVE
SARS-COV-2, NAAT: NOT DETECTED

## 2022-06-16 PROCEDURE — 99284 EMERGENCY DEPT VISIT MOD MDM: CPT

## 2022-06-16 PROCEDURE — 6370000000 HC RX 637 (ALT 250 FOR IP): Performed by: EMERGENCY MEDICINE

## 2022-06-16 PROCEDURE — 87635 SARS-COV-2 COVID-19 AMP PRB: CPT

## 2022-06-16 PROCEDURE — 87502 INFLUENZA DNA AMP PROBE: CPT

## 2022-06-16 PROCEDURE — 6360000002 HC RX W HCPCS: Performed by: EMERGENCY MEDICINE

## 2022-06-16 PROCEDURE — 96372 THER/PROPH/DIAG INJ SC/IM: CPT

## 2022-06-16 RX ORDER — IBUPROFEN 600 MG/1
600 TABLET ORAL EVERY 6 HOURS PRN
Qty: 25 TABLET | Refills: 0 | Status: SHIPPED | OUTPATIENT
Start: 2022-06-16 | End: 2022-10-21

## 2022-06-16 RX ORDER — TRIAMCINOLONE ACETONIDE 40 MG/ML
40 INJECTION, SUSPENSION INTRA-ARTICULAR; INTRAMUSCULAR ONCE
Status: COMPLETED | OUTPATIENT
Start: 2022-06-16 | End: 2022-06-16

## 2022-06-16 RX ORDER — VALBENAZINE 80 MG/1
1 CAPSULE ORAL DAILY
COMMUNITY
End: 2022-07-06

## 2022-06-16 RX ORDER — CETIRIZINE HYDROCHLORIDE 10 MG/1
10 TABLET ORAL DAILY
Qty: 30 TABLET | Refills: 0 | Status: SHIPPED | OUTPATIENT
Start: 2022-06-16

## 2022-06-16 RX ORDER — IBUPROFEN 800 MG/1
800 TABLET ORAL ONCE
Status: COMPLETED | OUTPATIENT
Start: 2022-06-16 | End: 2022-06-16

## 2022-06-16 RX ADMIN — IBUPROFEN 800 MG: 800 TABLET, FILM COATED ORAL at 22:46

## 2022-06-16 RX ADMIN — TRIAMCINOLONE ACETONIDE 40 MG: 40 INJECTION, SUSPENSION INTRA-ARTICULAR; INTRAMUSCULAR at 22:46

## 2022-06-16 ASSESSMENT — PAIN DESCRIPTION - ORIENTATION: ORIENTATION: ANTERIOR

## 2022-06-16 ASSESSMENT — ENCOUNTER SYMPTOMS
EYE DISCHARGE: 0
ABDOMINAL DISTENTION: 0
COUGH: 0
SHORTNESS OF BREATH: 0
WHEEZING: 0
SORE THROAT: 0
ABDOMINAL PAIN: 0
VOMITING: 0
RHINORRHEA: 1
CHEST TIGHTNESS: 0
SINUS PRESSURE: 1
PHOTOPHOBIA: 0
NAUSEA: 0

## 2022-06-16 ASSESSMENT — PAIN DESCRIPTION - ONSET: ONSET: ON-GOING

## 2022-06-16 ASSESSMENT — PAIN DESCRIPTION - LOCATION
LOCATION: HEAD
LOCATION: HEAD

## 2022-06-16 ASSESSMENT — PAIN SCALES - GENERAL
PAINLEVEL_OUTOF10: 8
PAINLEVEL_OUTOF10: 6

## 2022-06-16 ASSESSMENT — PAIN DESCRIPTION - DESCRIPTORS: DESCRIPTORS: ACHING

## 2022-06-16 ASSESSMENT — PAIN DESCRIPTION - FREQUENCY: FREQUENCY: CONTINUOUS

## 2022-06-16 ASSESSMENT — PAIN - FUNCTIONAL ASSESSMENT: PAIN_FUNCTIONAL_ASSESSMENT: 0-10

## 2022-06-17 NOTE — ED TRIAGE NOTES
The patient came to the ED for HA and shortness of breath that began yesterday. Pt states he \"feels sick\". A&Ox4.

## 2022-06-17 NOTE — ED PROVIDER NOTES
[facility] ED  eMERGENCY dEPARTMENT eNCOUnter      Pt Name: Kena Sanchez  MRN: [id number]  [place] 1995  Date of evaluation: 6/16/2022  Provider: Edith Aden MD    CHIEF COMPLAINT       Chief Complaint   Patient presents with    Headache     with SOB since yesterday         HISTORY OF PRESENT ILLNESS   (Location/Symptom, Timing/Onset,Context/Setting, Quality, Duration, Modifying Factors, Severity)  Note limiting factors. Kena Sanchez is a 32 y.o. male who presents to the emergency department with a 3-day history of allergy-like symptoms. Patient presents today with congestion, minimal cough, mild frontal headache with nasal congestion. No ear pain. No fever or chills. Mom is at bedside and states that he gets history of allergies every spring and this year he is not on medications. HPI    NursingNotes were reviewed. REVIEW OF SYSTEMS    (2-9 systems for level 4, 10 or more for level 5)     Review of Systems   Constitutional: Negative for chills and diaphoresis. HENT: Positive for congestion, rhinorrhea and sinus pressure. Negative for ear pain, mouth sores and sore throat. Eyes: Negative for photophobia and discharge. Respiratory: Negative for cough, chest tightness, shortness of breath and wheezing. Cardiovascular: Negative for chest pain and palpitations. Gastrointestinal: Negative for abdominal distention, abdominal pain, nausea and vomiting. Endocrine: Negative for cold intolerance. Genitourinary: Negative for difficulty urinating. Musculoskeletal: Negative for arthralgias, myalgias and neck pain. Skin: Negative for pallor and rash. Allergic/Immunologic: Negative for immunocompromised state. Neurological: Negative for dizziness and syncope. Hematological: Negative for adenopathy. Psychiatric/Behavioral: Negative for agitation and hallucinations. The patient is not nervous/anxious and is not hyperactive.     All other systems reviewed and are negative. Except as noted above the remainder of the review of systems was reviewed and negative. PAST MEDICAL HISTORY     Past Medical History:   Diagnosis Date    ADHD (attention deficit hyperactivity disorder) 2012    Allergic rhinitis 2012    Hypertension     Obesity (BMI 30-39.9) 2012    Sleep apnea          SURGICALHISTORY       Past Surgical History:   Procedure Laterality Date    TONSILLECTOMY           CURRENT MEDICATIONS       Previous Medications    AMLODIPINE (NORVASC) 10 MG TABLET    TAKE 1 TABLET BY MOUTH EVERY DAY    BLOOD PRESSURE MONITORING (ADULT BLOOD PRESSURE CUFF LG) KIT    Check blood pressure every other day    HYDROCHLOROTHIAZIDE (HYDRODIURIL) 25 MG TABLET    TAKE 1 TABLET BY MOUTH EVERY MORNING    LOSARTAN (COZAAR) 100 MG TABLET    TAKE 1 TABLET BY MOUTH EVERY DAY    ONDANSETRON (ZOFRAN ODT) 4 MG DISINTEGRATING TABLET    Take 1 tablet by mouth every 8 hours as needed for Nausea    OXCARBAZEPINE (TRILEPTAL) 300 MG TABLET    Take 1 tablet by mouth daily    VALBENAZINE TOSYLATE (INGREZZA) 80 MG CAPS    Take 1 tablet by mouth daily       ALLERGIES     Patient has no known allergies.     FAMILY HISTORY       Family History   Problem Relation Age of Onset    No Known Problems Mother     No Known Problems Father           SOCIAL HISTORY       Social History     Socioeconomic History    Marital status: Single     Spouse name: None    Number of children: None    Years of education: None    Highest education level: None   Occupational History    None   Tobacco Use    Smoking status: Former Smoker     Packs/day: 0.50     Years: 7.00     Pack years: 3.50     Types: Cigarettes     Start date: 2013     Quit date: 2018     Years since quittin.0    Smokeless tobacco: Never Used   Vaping Use    Vaping Use: Never used   Substance and Sexual Activity    Alcohol use: Not Currently     Comment: occ    Drug use: No    Sexual activity: Not Currently   Other Topics Concern    None   Social History Narrative    None     Social Determinants of Health     Financial Resource Strain:     Difficulty of Paying Living Expenses: Not on file   Food Insecurity:     Worried About Running Out of Food in the Last Year: Not on file    Jakob of Food in the Last Year: Not on file   Transportation Needs:     Lack of Transportation (Medical): Not on file    Lack of Transportation (Non-Medical): Not on file   Physical Activity:     Days of Exercise per Week: Not on file    Minutes of Exercise per Session: Not on file   Stress:     Feeling of Stress : Not on file   Social Connections:     Frequency of Communication with Friends and Family: Not on file    Frequency of Social Gatherings with Friends and Family: Not on file    Attends [employer] Services: Not on file    Active Member of [address] [place] [address] [employer] or Organizations: Not on file    Attends Club or Organization Meetings: Not on file    Marital Status: Not on file   Intimate Partner Violence:     Fear of Current or Ex-Partner: Not on file    Emotionally Abused: Not on file    Physically Abused: Not on file    Sexually Abused: Not on file   Housing Stability:     Unable to Pay for Housing in the Last Year: Not on file    Number of [place] in the Last Year: Not on file    Unstable Housing in the Last Year: Not on file       SCREENINGS    [place] Coma Scale  Eye Opening: Spontaneous  Best Verbal Response: Oriented  Best Motor Response: Obeys commands  [place] Coma Scale Score: 15 @FLOW([id number])@      PHYSICAL EXAM    (up to 7 for level 4, 8 or more for level 5)     ED Triage Vitals [06/16/22 2147]   BP Temp Temp Source Heart Rate Resp SpO2 Height Weight   (!) 143/82 99.2 °F (37.3 °C) Oral (!) 112 23 97 % 5' 6\" (1.676 m) 285 lb (129.3 kg)       Physical Exam  Vitals and nursing note reviewed. Constitutional:       Appearance: He is well-developed. HENT:      Head: Normocephalic.       Right Ear: Tympanic membrane normal. Left Ear: Tympanic membrane normal.      Nose: Nose normal.      Mouth/Throat:      Mouth: Mucous membranes are moist.      Pharynx: No posterior oropharyngeal erythema. Eyes:      Conjunctiva/sclera: Conjunctivae normal.      Pupils: Pupils are equal, round, and reactive to light. Cardiovascular:      Rate and Rhythm: Normal rate and regular rhythm. Heart sounds: Normal heart sounds. Pulmonary:      Effort: Pulmonary effort is normal.      Breath sounds: Normal breath sounds. Abdominal:      General: Bowel sounds are normal.      Palpations: Abdomen is soft. Tenderness: There is no abdominal tenderness. There is no guarding. Musculoskeletal:         General: Normal range of motion. Cervical back: Normal range of motion and neck supple. Skin:     General: Skin is warm and dry. Capillary Refill: Capillary refill takes less than 2 seconds. Neurological:      General: No focal deficit present. Mental Status: He is alert and oriented to person, place, and time. Psychiatric:         Mood and Affect: Mood normal.         DIAGNOSTIC RESULTS     EKG: All EKG's are interpreted by the Emergency Department Physician who either signs or Co-signsthis chart in the absence of a cardiologist.      RADIOLOGY:   Berdie Dung such as CT, Ultrasound and MRI are read by the radiologist. Plain radiographic images are visualized and preliminarily interpreted by the emergency physician with the below findings:      Interpretation per the Radiologist below, if available at the time ofthis note:    No orders to display         ED BEDSIDE ULTRASOUND:   Performed by ED Physician - none    LABS:  Labs Reviewed   COVID-19, RAPID   RAPID INFLUENZA A/B ANTIGENS       All other labs were within normal range or not returned as of this dictation.     EMERGENCY DEPARTMENT COURSE and DIFFERENTIAL DIAGNOSIS/MDM:   Vitals:    Vitals:    06/16/22 2147   BP: (!) 143/82   Pulse: (!) 112   Resp: 23   Temp:

## 2022-07-06 RX ORDER — VALBENAZINE 80 MG/1
CAPSULE ORAL
Qty: 30 CAPSULE | Refills: 3 | Status: SHIPPED | OUTPATIENT
Start: 2022-07-06 | End: 2022-07-12

## 2022-07-11 ENCOUNTER — HOSPITAL ENCOUNTER (OUTPATIENT)
Dept: NON INVASIVE DIAGNOSTICS | Age: 27
Discharge: HOME | End: 2022-07-11
Payer: MEDICARE

## 2022-07-11 LAB
EKG ATRIAL RATE: 88 BPM
EKG P AXIS: 76 DEGREES
EKG P-R INTERVAL: 158 MS
EKG Q-T INTERVAL: 364 MS
EKG QRS DURATION: 90 MS
EKG QTC CALCULATION (BAZETT): 440 MS
EKG R AXIS: 92 DEGREES
EKG T AXIS: 36 DEGREES
EKG VENTRICULAR RATE: 88 BPM

## 2022-07-11 PROCEDURE — 93005 ELECTROCARDIOGRAM TRACING: CPT | Performed by: NURSE PRACTITIONER

## 2022-07-11 PROCEDURE — 93010 ELECTROCARDIOGRAM REPORT: CPT | Performed by: INTERNAL MEDICINE

## 2022-07-12 RX ORDER — VALBENAZINE 80 MG/1
CAPSULE ORAL
Qty: 30 CAPSULE | Refills: 3 | Status: SHIPPED | OUTPATIENT
Start: 2022-07-12

## 2022-07-12 NOTE — TELEPHONE ENCOUNTER
Pharmacy is requesting medication refill.  Please approve or deny this request.    Rx requested:  Requested Prescriptions     Pending Prescriptions Disp Refills    INGREZZA 80 MG CAPS [Pharmacy Med Name: Darreld Prayer 80MG CAPSULES] 30 capsule 3     Sig: TAKE 1 CAPSULE BY MOUTH DAILY         Last Office Visit:   6/10/2022      Next Visit Date:  Future Appointments   Date Time Provider Merle Robin   10/10/2022 10:30 AM IVONNE YARBROUGH [facility] [address]   10/14/2022  9:30 AM Emilia Hanson MD [facility]

## 2022-08-30 ENCOUNTER — HOSPITAL ENCOUNTER (EMERGENCY)
Age: 27
Discharge: HOME | End: 2022-08-30
Attending: EMERGENCY MEDICINE
Payer: MEDICARE

## 2022-08-30 VITALS
BODY MASS INDEX: 45.8 KG/M2 | SYSTOLIC BLOOD PRESSURE: 151 MMHG | RESPIRATION RATE: 19 BRPM | HEIGHT: 66 IN | HEART RATE: 76 BPM | DIASTOLIC BLOOD PRESSURE: 95 MMHG | TEMPERATURE: 97.6 F | WEIGHT: 285 LBS | OXYGEN SATURATION: 98 %

## 2022-08-30 DIAGNOSIS — I10 HYPERTENSION, UNSPECIFIED TYPE: Primary | ICD-10-CM

## 2022-08-30 PROCEDURE — 99282 EMERGENCY DEPT VISIT SF MDM: CPT

## 2022-08-30 ASSESSMENT — PAIN DESCRIPTION - DESCRIPTORS: DESCRIPTORS: ACHING

## 2022-08-30 ASSESSMENT — PAIN DESCRIPTION - FREQUENCY: FREQUENCY: CONTINUOUS

## 2022-08-30 ASSESSMENT — PAIN DESCRIPTION - ONSET: ONSET: ON-GOING

## 2022-08-30 ASSESSMENT — PAIN SCALES - GENERAL: PAINLEVEL_OUTOF10: 8

## 2022-08-30 ASSESSMENT — PAIN DESCRIPTION - LOCATION: LOCATION: CHEST

## 2022-08-30 ASSESSMENT — PAIN - FUNCTIONAL ASSESSMENT: PAIN_FUNCTIONAL_ASSESSMENT: 0-10

## 2022-08-30 ASSESSMENT — PAIN DESCRIPTION - ORIENTATION: ORIENTATION: MID

## 2022-08-30 ASSESSMENT — PAIN DESCRIPTION - PAIN TYPE: TYPE: ACUTE PAIN

## 2022-08-30 NOTE — ED PROVIDER NOTES
[facility] ED  EMERGENCY DEPARTMENT ENCOUNTER      Pt Name: Velasquez Blanton  MRN: [id number]  [place] 1995  Date of evaluation: 8/30/2022  Provider: Jenna Hoyos MD    CHIEF COMPLAINT       Chief Complaint   Patient presents with    Hypertension         HISTORY OF PRESENT ILLNESS   (Location/Symptom, Timing/Onset, Context/Setting, Quality, Duration, Modifying Factors, Severity)  Note limiting factors. 49-year-old male presenting with elevated blood pressure. He notes no symptoms. States he has a history of this. Does take medication for it. Nursing Notes were reviewed. REVIEW OF SYSTEMS    (2-9 systems for level 4, 10 or more for level 5)     Review of Systems   All other systems reviewed and are negative. Except as noted above the remainder of the review of systems was reviewed and negative.        PAST MEDICAL HISTORY     Past Medical History:   Diagnosis Date    ADHD (attention deficit hyperactivity disorder) 04/04/2012    Allergic rhinitis 04/04/2012    Autism     Hypertension     Obesity (BMI 30-39.9) 04/04/2012    Sleep apnea          SURGICAL HISTORY       Past Surgical History:   Procedure Laterality Date    TONSILLECTOMY           CURRENT MEDICATIONS       Current Discharge Medication List        CONTINUE these medications which have NOT CHANGED    Details   INGREZZA 80 MG CAPS TAKE 1 CAPSULE BY MOUTH DAILY  Qty: 30 capsule, Refills: 3      ibuprofen (IBU) 600 MG tablet Take 1 tablet by mouth every 6 hours as needed for Pain  Qty: 25 tablet, Refills: 0      cetirizine (ZYRTEC) 10 MG tablet Take 1 tablet by mouth daily  Qty: 30 tablet, Refills: 0      ondansetron (ZOFRAN ODT) 4 MG disintegrating tablet Take 1 tablet by mouth every 8 hours as needed for Nausea  Qty: 10 tablet, Refills: 0      OXcarbazepine (TRILEPTAL) 300 MG tablet Take 1 tablet by mouth daily  Qty: 30 tablet, Refills: 2      amLODIPine (NORVASC) 10 MG tablet TAKE 1 TABLET BY MOUTH EVERY DAY  Qty: 90 tablet, Refills: 1    Associated Diagnoses: Obesity (BMI 30-39.9); Essential hypertension      hydroCHLOROthiazide (HYDRODIURIL) 25 MG tablet TAKE 1 TABLET BY MOUTH EVERY MORNING  Qty: 90 tablet, Refills: 1      losartan (COZAAR) 100 MG tablet TAKE 1 TABLET BY MOUTH EVERY DAY  Qty: 90 tablet, Refills: 1      Blood Pressure Monitoring (ADULT BLOOD PRESSURE CUFF LG) KIT Check blood pressure every other day  Qty: 1 kit, Refills: 0             ALLERGIES     Patient has no known allergies. FAMILY HISTORY       Family History   Problem Relation Age of Onset    No Known Problems Mother     No Known Problems Father           SOCIAL HISTORY       Social History     Socioeconomic History    Marital status: Single     Spouse name: None    Number of children: None    Years of education: None    Highest education level: None   Tobacco Use    Smoking status: Former     Packs/day: 0.50     Years: 7.00     Pack years: 3.50     Types: Cigarettes     Start date: 2013     Quit date: 2018     Years since quittin.2    Smokeless tobacco: Never   Vaping Use    Vaping Use: Never used   Substance and Sexual Activity    Alcohol use: Not Currently     Comment: occ    Drug use: No    Sexual activity: Not Currently       SCREENINGS    [place] Coma Scale  Eye Opening: Spontaneous  Best Verbal Response: Oriented  Best Motor Response: Obeys commands  [place] Coma Scale Score: 15          PHYSICAL EXAM    (up to 7 for level 4, 8 or more for level 5)     ED Triage Vitals [22 1758]   BP Temp Temp Source Heart Rate Resp SpO2 Height Weight   (!) 154/95 97.6 °F (36.4 °C) Temporal 96 22 100 % 5' 6\" (1.676 m) 285 lb (129.3 kg)       Physical Exam  Vitals and nursing note reviewed. Constitutional:       General: He is not in acute distress. Appearance: Normal appearance. He is well-developed. He is not ill-appearing. HENT:      Head: Normocephalic and atraumatic.       Mouth/Throat:      Mouth: Mucous membranes are moist. Pharynx: Oropharynx is clear. Eyes:      Extraocular Movements: Extraocular movements intact. Conjunctiva/sclera: Conjunctivae normal.   Cardiovascular:      Rate and Rhythm: Normal rate and regular rhythm. Pulmonary:      Effort: Pulmonary effort is normal.      Breath sounds: Normal breath sounds. Abdominal:      General: Bowel sounds are normal.      Palpations: Abdomen is soft. Tenderness: There is no abdominal tenderness. Musculoskeletal:         General: No deformity. Normal range of motion. Cervical back: Normal range of motion and neck supple. Skin:     General: Skin is warm and dry. Capillary Refill: Capillary refill takes less than 2 seconds. Neurological:      General: No focal deficit present. Mental Status: He is alert and oriented to person, place, and time. Mental status is at baseline. Cranial Nerves: No cranial nerve deficit. Psychiatric:         Thought Content: Thought content normal.       DIAGNOSTIC RESULTS     EKG: All EKG's are interpreted by the Emergency Department Physician who either signs or Co-signs this chart in the absence of a cardiologist.    RADIOLOGY:   Non-plain film images such as CT, Ultrasound and MRI are read by the radiologist. Plain radiographic images are visualized and preliminarily interpreted by the emergency physician with the below findings:    Interpretation per the Radiologist below, if available at the time of this note:    No orders to display       LABS:  Labs Reviewed - No data to display    All other labs were within normal range or not returned as of this dictation.     EMERGENCY DEPARTMENT COURSE and DIFFERENTIAL DIAGNOSIS/MDM:   Vitals:    Vitals:    08/30/22 1758 08/30/22 1845   BP: (!) 154/95 (!) 151/95   Pulse: 96 76   Resp: 22 19   Temp: 97.6 °F (36.4 °C)    TempSrc: Temporal    SpO2: 100% 98%   Weight: 285 lb (129.3 kg)    Height: 5' 6\" (1.676 m)        MDM      Procedures    CRITICAL CARE TIME   Total Critical Care time was 0 minutes, excluding separately reportable procedures. There was a high probability of clinically significant/life threatening deterioration in the patient's condition which required my urgent intervention. FINAL IMPRESSION      1.  Hypertension, unspecified type          DISPOSITION/PLAN   DISPOSITION Decision To Discharge 08/30/2022 07:06:46 PM      (Please note that portions of this note were completed with a voice recognition program.  Efforts were made to edit the dictations but occasionally words are mis-transcribed.)    Tyshawn Wynne MD (electronically signed)  Attending Emergency Physician       Tyshawn Wynne MD  08/30/22 6655

## 2022-08-30 NOTE — ED TRIAGE NOTES
Pt presents to ED c/o HTN, headache  Pt states he was at the [facility], while donating, became shaky  Pt states chest pain, SOB with exertion  Pt hx autism  Pt presents to triage very anxious

## 2022-08-31 ENCOUNTER — HOSPITAL ENCOUNTER (OUTPATIENT)
Dept: NON INVASIVE DIAGNOSTICS | Age: 27
Discharge: HOME | End: 2022-08-31
Payer: MEDICARE

## 2022-08-31 DIAGNOSIS — I51.9 HEART DISEASE, UNSPECIFIED: ICD-10-CM

## 2022-08-31 LAB
LV EF: 53 %
LVEF MODALITY: NORMAL

## 2022-08-31 PROCEDURE — 93306 TTE W/DOPPLER COMPLETE: CPT

## 2022-10-04 PROBLEM — R06.00 DYSPNEA, UNSPECIFIED: Status: ACTIVE | Noted: 2021-11-08

## 2022-10-04 PROBLEM — E87.6 HYPOKALEMIA: Status: ACTIVE | Noted: 2022-10-04

## 2022-10-04 PROBLEM — R09.89 OTH SYMPTOMS AND SIGNS INVOLVING THE CIRC AND RESP SYSTEMS: Status: ACTIVE | Noted: 2021-11-08

## 2022-10-04 PROBLEM — F41.0 PANIC DISORDER (EPISODIC PAROXYSMAL ANXIETY): Status: ACTIVE | Noted: 2021-11-08

## 2022-10-04 PROBLEM — F84.0 AUTISTIC DISORDER: Status: ACTIVE | Noted: 2021-11-08

## 2022-10-04 PROBLEM — M62.82 RHABDOMYOLYSIS: Status: ACTIVE | Noted: 2022-10-04
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
## 2022-10-04 PROBLEM — F31.9 BIPOLAR DISORDER (HCC): Status: ACTIVE | Noted: 2021-11-08

## 2022-10-21 ENCOUNTER — HOSPITAL ENCOUNTER (EMERGENCY)
Age: 27
Discharge: HOME | End: 2022-10-21
Attending: STUDENT IN AN ORGANIZED HEALTH CARE EDUCATION/TRAINING PROGRAM
Payer: MEDICARE

## 2022-10-21 ENCOUNTER — APPOINTMENT (OUTPATIENT)
Dept: GENERAL RADIOLOGY | Age: 27
End: 2022-10-21
Payer: MEDICARE

## 2022-10-21 VITALS
OXYGEN SATURATION: 97 % | TEMPERATURE: 99 F | BODY MASS INDEX: 45.52 KG/M2 | DIASTOLIC BLOOD PRESSURE: 82 MMHG | SYSTOLIC BLOOD PRESSURE: 150 MMHG | HEART RATE: 88 BPM | HEIGHT: 67 IN | WEIGHT: 290 LBS | RESPIRATION RATE: 20 BRPM

## 2022-10-21 DIAGNOSIS — M54.50 ACUTE LOW BACK PAIN WITHOUT SCIATICA, UNSPECIFIED BACK PAIN LATERALITY: ICD-10-CM

## 2022-10-21 DIAGNOSIS — M10.9 ACUTE GOUT OF MULTIPLE SITES, UNSPECIFIED CAUSE: Primary | ICD-10-CM

## 2022-10-21 LAB
GFR SERPL CREATININE-BSD FRML MDRD: >60 ML/MIN/{1.73_M2}
PERFORMED ON: NORMAL
POC CREATININE WHOLE BLOOD: 1
POC CREATININE: 1 MG/DL (ref 0.8–1.3)
POC SAMPLE TYPE: NORMAL
URIC ACID, SERUM: 8.4 MG/DL (ref 3.4–7)

## 2022-10-21 PROCEDURE — 6360000002 HC RX W HCPCS: Performed by: STUDENT IN AN ORGANIZED HEALTH CARE EDUCATION/TRAINING PROGRAM

## 2022-10-21 PROCEDURE — 99284 EMERGENCY DEPT VISIT MOD MDM: CPT

## 2022-10-21 PROCEDURE — 72110 X-RAY EXAM L-2 SPINE 4/>VWS: CPT

## 2022-10-21 PROCEDURE — 73610 X-RAY EXAM OF ANKLE: CPT

## 2022-10-21 PROCEDURE — 6370000000 HC RX 637 (ALT 250 FOR IP): Performed by: STUDENT IN AN ORGANIZED HEALTH CARE EDUCATION/TRAINING PROGRAM

## 2022-10-21 PROCEDURE — 72074 X-RAY EXAM THORAC SPINE4/>VW: CPT

## 2022-10-21 PROCEDURE — 96372 THER/PROPH/DIAG INJ SC/IM: CPT

## 2022-10-21 PROCEDURE — 84550 ASSAY OF BLOOD/URIC ACID: CPT

## 2022-10-21 RX ORDER — COLCHICINE 0.6 MG/1
1.2 TABLET ORAL ONCE
Status: COMPLETED | OUTPATIENT
Start: 2022-10-21 | End: 2022-10-21

## 2022-10-21 RX ORDER — NAPROXEN 500 MG/1
500 TABLET ORAL 2 TIMES DAILY WITH MEALS
Qty: 60 TABLET | Refills: 0 | Status: SHIPPED | OUTPATIENT
Start: 2022-10-21 | End: 2022-10-31

## 2022-10-21 RX ORDER — COLCHICINE 0.6 MG/1
TABLET ORAL
Qty: 60 TABLET | Refills: 0 | Status: SHIPPED | OUTPATIENT
Start: 2022-10-21

## 2022-10-21 RX ORDER — KETOROLAC TROMETHAMINE 30 MG/ML
60 INJECTION, SOLUTION INTRAMUSCULAR; INTRAVENOUS ONCE
Status: COMPLETED | OUTPATIENT
Start: 2022-10-21 | End: 2022-10-21

## 2022-10-21 RX ADMIN — COLCHICINE 1.2 MG: 0.6 TABLET, FILM COATED ORAL at 18:34

## 2022-10-21 RX ADMIN — KETOROLAC TROMETHAMINE 60 MG: 30 INJECTION, SOLUTION INTRAMUSCULAR at 17:24

## 2022-10-21 ASSESSMENT — ENCOUNTER SYMPTOMS
SINUS PRESSURE: 0
BACK PAIN: 1
TROUBLE SWALLOWING: 0
ABDOMINAL PAIN: 0
DIARRHEA: 0
SHORTNESS OF BREATH: 0
VOMITING: 0
CHEST TIGHTNESS: 0
COUGH: 0

## 2022-10-21 ASSESSMENT — PAIN DESCRIPTION - ORIENTATION
ORIENTATION_2: LEFT;LOWER
ORIENTATION: RIGHT

## 2022-10-21 ASSESSMENT — PAIN - FUNCTIONAL ASSESSMENT: PAIN_FUNCTIONAL_ASSESSMENT: 0-10

## 2022-10-21 ASSESSMENT — PAIN DESCRIPTION - LOCATION
LOCATION: ANKLE
LOCATION_2: BACK

## 2022-10-21 ASSESSMENT — PAIN DESCRIPTION - DESCRIPTORS
DESCRIPTORS_2: BURNING
DESCRIPTORS: ACHING

## 2022-10-21 ASSESSMENT — PAIN DESCRIPTION - INTENSITY: RATING_2: 9

## 2022-10-21 ASSESSMENT — PAIN DESCRIPTION - PAIN TYPE: TYPE: ACUTE PAIN

## 2022-10-21 ASSESSMENT — PAIN SCALES - GENERAL: PAINLEVEL_OUTOF10: 8

## 2022-10-21 NOTE — ED TRIAGE NOTES
A & Ox4. Skin warm and dry. States he walks a lot and now has right ankle pain and left lower back pain. Denies injury. Amb with steady gait. Denies radiation of back pain.

## 2022-10-21 NOTE — ED PROVIDER NOTES
[facility] ED  eMERGENCY dEPARTMENT eNCOUnter      Pt Name: Danny Sapp  MRN: [id number]  [place] 1995  Date of evaluation: 10/21/2022  Provider: Chucky Camargo, [address] [place] [address]       Chief Complaint   Patient presents with    Ankle Pain     Right ankle pain x 2 weeks    Back Pain     Left lower back pain x 2-3 weeks         HISTORY OF PRESENT ILLNESS   (Location/Symptom, Timing/Onset,Context/Setting, Quality, Duration, Modifying Factors, Severity)  Note limiting factors. Danny Sapp is a 32 y.o. male who presents to the emergency department right swelling x2 to 3 weeks. Pain at the lateral malleolus and anterior ankle (the patient points to these areas). Patient denies any injury, twisting or trauma. Patient also complains of left lower back pain. Worse with flexion, side bending and rotation. Patient does not recall any particular lifting or movement that had caused the pain. Slowly worsening. Patient denies any fever, chills or cough. When asked if he has gout the patient states he does not think so. Patient denies any nausea, vomiting or diarrhea. Walking, movement and position change worsens the ankle pain. Movement, and position change worsens his back pain. No reports of loss of bowel or bladder control. No reports of numbness or tingling, or loss of strength. The history is provided by the patient. NursingNotes were reviewed. REVIEW OF SYSTEMS    (2-9 systems for level 4, 10 or more for level 5)     Review of Systems   Constitutional:  Negative for activity change, appetite change, chills, fever and unexpected weight change. HENT:  Negative for drooling, ear pain, nosebleeds, sinus pressure and trouble swallowing. Respiratory:  Negative for cough, chest tightness and shortness of breath. Cardiovascular:  Negative for chest pain and leg swelling. Gastrointestinal:  Negative for abdominal pain, diarrhea and vomiting.    Endocrine: Negative for polydipsia and polyphagia. Genitourinary:  Negative for dysuria, flank pain and frequency. Musculoskeletal:  Positive for arthralgias (Ankle pain; right) and back pain. Negative for myalgias. Skin:  Negative for pallor and rash. Neurological:  Negative for syncope, weakness and headaches. Hematological:  Does not bruise/bleed easily. All other systems reviewed and are negative. Except as noted above the remainder of the review of systems was reviewed and negative. PAST MEDICAL HISTORY     Past Medical History:   Diagnosis Date    ADHD (attention deficit hyperactivity disorder) 04/04/2012    Allergic rhinitis 04/04/2012    Autism     Hypertension     Obesity (BMI 30-39.9) 04/04/2012    Sleep apnea          SURGICALHISTORY       Past Surgical History:   Procedure Laterality Date    TONSILLECTOMY           CURRENT MEDICATIONS       Previous Medications    AMLODIPINE (NORVASC) 10 MG TABLET    TAKE 1 TABLET BY MOUTH EVERY DAY    BLOOD PRESSURE MONITORING (ADULT BLOOD PRESSURE CUFF LG) KIT    Check blood pressure every other day    CETIRIZINE (ZYRTEC) 10 MG TABLET    Take 1 tablet by mouth daily    HYDROCHLOROTHIAZIDE (HYDRODIURIL) 25 MG TABLET    TAKE 1 TABLET BY MOUTH EVERY MORNING    INGREZZA 80 MG CAPS    TAKE 1 CAPSULE BY MOUTH DAILY    LOSARTAN (COZAAR) 100 MG TABLET    TAKE 1 TABLET BY MOUTH EVERY DAY    OXCARBAZEPINE (TRILEPTAL) 300 MG TABLET    Take 1 tablet by mouth daily       ALLERGIES     Patient has no known allergies.     FAMILY HISTORY       Family History   Problem Relation Age of Onset    No Known Problems Mother     No Known Problems Father           SOCIAL HISTORY       Social History     Socioeconomic History    Marital status: Single     Spouse name: None    Number of children: None    Years of education: None    Highest education level: None   Tobacco Use    Smoking status: Former     Packs/day: 0.50     Years: 7.00     Pack years: 3.50     Types: Cigarettes     Start date: 2013     Quit date: 2018     Years since quittin.3    Smokeless tobacco: Never   Vaping Use    Vaping Use: Never used   Substance and Sexual Activity    Alcohol use: Not Currently     Comment: occ    Drug use: No    Sexual activity: Not Currently       SCREENINGS    [place] Coma Scale  Eye Opening: Spontaneous  Best Verbal Response: Oriented  Best Motor Response: Obeys commands  [place] Coma Scale Score: 15 @FLOW([id number])@      PHYSICAL EXAM    (up to 7 for level 4, 8 or more for level 5)     ED Triage Vitals [10/21/22 1638]   BP Temp Temp Source Heart Rate Resp SpO2 Height Weight   (!) 150/82 99 °F (37.2 °C) Oral (!) 113 20 97 % 5' 7\" (1.702 m) 290 lb (131.5 kg)       Physical Exam  Vitals and nursing note reviewed. Constitutional:       General: He is awake. He is not in acute distress. Appearance: Normal appearance. He is well-developed. He is morbidly obese. He is not ill-appearing, toxic-appearing or diaphoretic. Comments: No photophobia. No phonophobia. HENT:      Head: Normocephalic and atraumatic. No Russell's sign. Right Ear: External ear normal.      Left Ear: External ear normal.      Nose: Nose normal. No congestion or rhinorrhea. Mouth/Throat:      Mouth: Mucous membranes are moist.      Pharynx: Oropharynx is clear. No oropharyngeal exudate or posterior oropharyngeal erythema. Eyes:      General: No scleral icterus. Right eye: No foreign body or discharge. Left eye: No discharge. Extraocular Movements: Extraocular movements intact. Conjunctiva/sclera: Conjunctivae normal.      Left eye: No exudate. Pupils: Pupils are equal, round, and reactive to light. Neck:      Vascular: No JVD. Trachea: No tracheal deviation. Comments: No meningismus. Cardiovascular:      Rate and Rhythm: Regular rhythm. Tachycardia present. Pulses: Normal pulses. Heart sounds: Normal heart sounds. Heart sounds not distant.  No murmur heard.    No friction rub. No gallop. Pulmonary:      Effort: Pulmonary effort is normal. No respiratory distress. Breath sounds: Normal breath sounds. No stridor. No wheezing, rhonchi or rales. Chest:      Chest wall: No tenderness. Abdominal:      General: Abdomen is flat. Bowel sounds are normal. There is no distension. Palpations: Abdomen is soft. There is no mass. Tenderness: There is no abdominal tenderness. There is no right CVA tenderness, left CVA tenderness, guarding or rebound. Hernia: No hernia is present. Musculoskeletal:         General: No swelling, tenderness, deformity or signs of injury. Normal range of motion. Cervical back: Normal range of motion and neck supple. No rigidity. Feet:    Lymphadenopathy:      Head:      Right side of head: No submental adenopathy. Left side of head: No submental adenopathy. Skin:     General: Skin is warm and dry. Capillary Refill: Capillary refill takes less than 2 seconds. Coloration: Skin is not jaundiced or pale. Findings: No bruising, erythema, lesion or rash. Neurological:      General: No focal deficit present. Mental Status: He is alert and oriented to person, place, and time. Mental status is at baseline. Cranial Nerves: No cranial nerve deficit. Sensory: No sensory deficit. Motor: No weakness. Coordination: Coordination normal.      Deep Tendon Reflexes: Reflexes are normal and symmetric. Psychiatric:         Mood and Affect: Mood normal.         Behavior: Behavior normal. Behavior is cooperative. Thought Content:  Thought content normal.         Judgment: Judgment normal.       DIAGNOSTIC RESULTS     EKG: All EKG's are interpreted by the Emergency Department Physician who either signs or Co-signsthis chart in the absence of a cardiologist.        RADIOLOGY:   Non-plain filmimages such as CT, Ultrasound and MRI are read by the radiologist. Plain radiographic COLCHICINE (COLCRYS) 0.6 MG TABLET    1 tab BID    NAPROXEN (EC NAPROSYN) 500 MG EC TABLET    Take 1 tablet by mouth 2 times daily (with meals) for 10 days          (Please note that portions of this note were completed with a voice recognition program.  Efforts were made to edit the dictations but occasionally words are mis-transcribed.)    Sreedhar Barker DO (electronically signed)  Attending Emergency Physician          Sreedhar Barker DO  10/21/22 2811

## 2022-10-21 NOTE — DISCHARGE INSTRUCTIONS
Indicates that you have gout. X-rays appear unremarkable. You are to take colchicine 2 times a day. Follow-up with your family doctor who can switch you to a another medicine after the gout attack is improved they can help prevent it. At this time it is too early for you to take the other medication. The other medication is called allopurinol. Alcohol, red meats especially beef and pork can worsen gout.

## 2022-11-08 RX ORDER — VALBENAZINE 80 MG/1
CAPSULE ORAL
Qty: 30 CAPSULE | Refills: 3 | Status: SHIPPED | OUTPATIENT
Start: 2022-11-08

## 2022-11-08 NOTE — TELEPHONE ENCOUNTER
Pharmacy is requesting medication refill.  Please approve or deny this request.    Rx requested:  Requested Prescriptions     Pending Prescriptions Disp Refills    INGREZZA 80 MG CAPS [Pharmacy Med Name: Laz Brian 80MG CAPSULES] 30 capsule 3     Sig: TAKE 1 CAPSULE BY MOUTH DAILY         Last Office Visit:   6/10/2022      Next Visit Date:  Future Appointments   Date Time Provider Merle Robin   12/30/2022 10:00 AM Melquiades Fish  [facility] -

## 2022-12-19 NOTE — PROGRESS NOTES
Patient states blood pressure is very high and needs advised. Per patient BP is 179/108. Please call ASAP. Subjective:     Elizabeth Wells is a 22 y.o. male who complains today of:     Chief Complaint   Patient presents with    Follow-up     f/u for Sleep Study results. HPI  Patient has PSG done on  10/16/20  an shows CHRISTOPHER. AHI  79.7 RDI 79.7 and O2 desaturation. He had CPAP study done on 20  and recommend BIPAP 14/10 cm . He slept better with BIPAP  he is complaining of snoring and daytime sleepiness and tiredness. C/o witness apnea. he does not wakes up with gasping for air. C/o wakes up frequently during sleep . No complaint of morning headache.  he  does  have restful sleep.  he does  fall asleep while watching TV as per mother. he  does not drive. he does not have difficulty falling sleep, but  staying asleep.     He has Bipolar disorder on abilify and depakot    Allergies:  Patient has no known allergies.   Past Medical History:   Diagnosis Date    ADHD (attention deficit hyperactivity disorder) 2012    Allergic rhinitis 2012    Hypertension     Obesity (BMI 30-39.9) 2012    Sleep apnea      Past Surgical History:   Procedure Laterality Date    TONSILLECTOMY       Family History   Problem Relation Age of Onset    No Known Problems Mother     No Known Problems Father      Social History     Socioeconomic History    Marital status: Single     Spouse name: Not on file    Number of children: Not on file    Years of education: Not on file    Highest education level: Not on file   Occupational History    Not on file   Social Needs    Financial resource strain: Not on file    Food insecurity     Worry: Not on file     Inability: Not on file    Transportation needs     Medical: Not on file     Non-medical: Not on file   Tobacco Use    Smoking status: Former Smoker     Packs/day: 0.50     Years: 7.00     Pack years: 3.50     Types: Cigarettes     Start date: 2013     Quit date: 2018     Years since quittin.6    Smokeless tobacco: Never Used Substance and Sexual Activity    Alcohol use: Yes     Comment: occ    Drug use: No    Sexual activity: Not on file   Lifestyle    Physical activity     Days per week: Not on file     Minutes per session: Not on file    Stress: Not on file   Relationships    Social connections     Talks on phone: Not on file     Gets together: Not on file     Attends Orthodoxy service: Not on file     Active member of club or organization: Not on file     Attends meetings of clubs or organizations: Not on file     Relationship status: Not on file    Intimate partner violence     Fear of current or ex partner: Not on file     Emotionally abused: Not on file     Physically abused: Not on file     Forced sexual activity: Not on file   Other Topics Concern    Not on file   Social History Narrative    Not on file         Review of Systems   Constitutional: Negative for chills, diaphoresis, fatigue and fever. HENT: Negative for congestion, mouth sores, nosebleeds, postnasal drip, rhinorrhea, sneezing, sore throat and voice change. Snoring   Eyes: Negative for itching and visual disturbance. Respiratory: Negative for cough, chest tightness, shortness of breath and wheezing. Cardiovascular: Negative. Negative for chest pain, palpitations and leg swelling. Gastrointestinal: Negative for abdominal pain, diarrhea, nausea and vomiting. Genitourinary: Negative for difficulty urinating and hematuria. Musculoskeletal: Negative for arthralgias, joint swelling and myalgias. Skin: Negative for rash. Allergic/Immunologic: Negative for environmental allergies. Neurological: Negative for dizziness, tremors, weakness and headaches. Psychiatric/Behavioral: Positive for sleep disturbance. Negative for behavioral problems.          :     Vitals:    01/05/21 1531   BP: 124/62   Pulse: 113   Resp: 16   Temp: 97.3 °F (36.3 °C)   TempSrc: Temporal   SpO2: 98%   Weight: (!) 326 lb (147.9 kg)   Height: 5' 5\" (1.651 m) Wt Readings from Last 3 Encounters:   01/05/21 (!) 326 lb (147.9 kg)   11/25/20 (!) 318 lb (144.2 kg)   09/02/20 (!) 319 lb 9.6 oz (145 kg)         Physical Exam  Constitutional:       Appearance: He is well-developed. He is obese. HENT:      Head: Normocephalic and atraumatic. Nose: Nose normal.   Eyes:      Conjunctiva/sclera: Conjunctivae normal.      Pupils: Pupils are equal, round, and reactive to light. Neck:      Thyroid: No thyromegaly. Vascular: No JVD. Trachea: No tracheal deviation. Cardiovascular:      Rate and Rhythm: Normal rate and regular rhythm. Heart sounds: No murmur. No friction rub. No gallop. Pulmonary:      Effort: Pulmonary effort is normal. No respiratory distress. Breath sounds: Normal breath sounds. No wheezing or rales. Chest:      Chest wall: No tenderness. Abdominal:      General: There is no distension. Musculoskeletal: Normal range of motion. Lymphadenopathy:      Cervical: No cervical adenopathy. Skin:     General: Skin is warm and dry. Findings: No rash. Neurological:      Mental Status: He is alert and oriented to person, place, and time. Cranial Nerves: No cranial nerve deficit.    Psychiatric:         Behavior: Behavior normal.         Current Outpatient Medications   Medication Sig Dispense Refill    amLODIPine (NORVASC) 10 MG tablet Take 1 tablet by mouth daily 90 tablet 1    hydroCHLOROthiazide (HYDRODIURIL) 25 MG tablet Take 1 tablet by mouth every morning 90 tablet 1    losartan (COZAAR) 100 MG tablet Take 1 tablet by mouth daily 90 tablet 1    ARIPiprazole (ABILIFY) 10 MG tablet TAKE 1 TABLET BY MOUTH EVERY DAY  2    benztropine (COGENTIN) 1 MG tablet Take 1 mg by mouth 2 times daily  0    divalproex (DEPAKOTE ER) 500 MG extended release tablet Take 1 tablet by mouth nightly 30 tablet 3    Blood Pressure Monitoring (ADULT BLOOD PRESSURE CUFF LG) KIT Check blood pressure every other day 1 kit 0

## 2023-01-16 ENCOUNTER — TELEPHONE (OUTPATIENT)
Dept: NEUROLOGY | Age: 28
End: 2023-01-16

## 2023-02-12 ENCOUNTER — APPOINTMENT (OUTPATIENT)
Dept: CT IMAGING | Age: 28
End: 2023-02-12
Payer: MEDICARE

## 2023-02-12 ENCOUNTER — HOSPITAL ENCOUNTER (EMERGENCY)
Age: 28
Discharge: HOME | End: 2023-02-12
Attending: STUDENT IN AN ORGANIZED HEALTH CARE EDUCATION/TRAINING PROGRAM
Payer: MEDICARE

## 2023-02-12 VITALS
BODY MASS INDEX: 49.44 KG/M2 | DIASTOLIC BLOOD PRESSURE: 66 MMHG | HEIGHT: 67 IN | RESPIRATION RATE: 20 BRPM | TEMPERATURE: 98.7 F | OXYGEN SATURATION: 99 % | SYSTOLIC BLOOD PRESSURE: 159 MMHG | WEIGHT: 315 LBS | HEART RATE: 93 BPM

## 2023-02-12 DIAGNOSIS — R51.9 ACUTE NONINTRACTABLE HEADACHE, UNSPECIFIED HEADACHE TYPE: Primary | ICD-10-CM

## 2023-02-12 LAB
ALBUMIN SERPL-MCNC: 4.5 G/DL (ref 3.5–4.6)
ALP BLD-CCNC: 75 U/L (ref 35–104)
ALT SERPL-CCNC: 37 U/L (ref 0–41)
ANION GAP SERPL CALCULATED.3IONS-SCNC: 14 MEQ/L (ref 9–15)
APTT: 29.5 SEC (ref 24.4–36.8)
AST SERPL-CCNC: 29 U/L (ref 0–40)
BASOPHILS ABSOLUTE: 0.1 K/UL (ref 0–0.2)
BASOPHILS RELATIVE PERCENT: 1.2 %
BILIRUB SERPL-MCNC: 0.6 MG/DL (ref 0.2–0.7)
BILIRUBIN URINE: NEGATIVE
BLOOD, URINE: NEGATIVE
BUN BLDV-MCNC: 15 MG/DL (ref 6–20)
CALCIUM SERPL-MCNC: 9.4 MG/DL (ref 8.5–9.9)
CHLORIDE BLD-SCNC: 95 MEQ/L (ref 95–107)
CLARITY: CLEAR
CO2: 28 MEQ/L (ref 20–31)
COLOR: YELLOW
CREAT SERPL-MCNC: 1.02 MG/DL (ref 0.7–1.2)
EOSINOPHILS ABSOLUTE: 0.2 K/UL (ref 0–0.7)
EOSINOPHILS RELATIVE PERCENT: 3.4 %
GFR SERPL CREATININE-BSD FRML MDRD: >60 ML/MIN/{1.73_M2}
GFR SERPL CREATININE-BSD FRML MDRD: >60 ML/MIN/{1.73_M2}
GLOBULIN: 3.4 G/DL (ref 2.3–3.5)
GLUCOSE BLD-MCNC: 156 MG/DL (ref 70–99)
GLUCOSE BLD-MCNC: 209 MG/DL (ref 70–99)
GLUCOSE URINE: NEGATIVE MG/DL
HCT VFR BLD CALC: 42.7 % (ref 42–52)
HEMOGLOBIN: 14.2 G/DL (ref 14–18)
INFLUENZA A BY PCR: NEGATIVE
INFLUENZA B BY PCR: NEGATIVE
INR BLD: 1
KETONES, URINE: NEGATIVE MG/DL
LEUKOCYTE ESTERASE, URINE: NEGATIVE
LYMPHOCYTES ABSOLUTE: 1.6 K/UL (ref 1–4.8)
LYMPHOCYTES RELATIVE PERCENT: 33.1 %
MAGNESIUM: 2 MG/DL (ref 1.7–2.4)
MCH RBC QN AUTO: 28.5 PG (ref 27–31.3)
MCHC RBC AUTO-ENTMCNC: 33.2 % (ref 33–37)
MCV RBC AUTO: 85.8 FL (ref 79–92.2)
MONOCYTES ABSOLUTE: 0.5 K/UL (ref 0.2–0.8)
MONOCYTES RELATIVE PERCENT: 10.8 %
NEUTROPHILS ABSOLUTE: 2.5 K/UL (ref 1.4–6.5)
NEUTROPHILS RELATIVE PERCENT: 51.5 %
NITRITE, URINE: NEGATIVE
PDW BLD-RTO: 12.9 % (ref 11.5–14.5)
PERFORMED ON: ABNORMAL
PERFORMED ON: NORMAL
PH UA: 6 (ref 5–9)
PLATELET # BLD: 251 K/UL (ref 130–400)
POC CREATININE: 1.1 MG/DL (ref 0.8–1.3)
POC SAMPLE TYPE: NORMAL
POTASSIUM SERPL-SCNC: 4 MEQ/L (ref 3.4–4.9)
PROTEIN UA: NEGATIVE MG/DL
PROTHROMBIN TIME: 13.4 SEC (ref 12.3–14.9)
RBC # BLD: 4.98 M/UL (ref 4.7–6.1)
SARS-COV-2, NAAT: NOT DETECTED
SEDIMENTATION RATE, ERYTHROCYTE: 7 MM (ref 0–10)
SODIUM BLD-SCNC: 137 MEQ/L (ref 135–144)
SPECIFIC GRAVITY UA: 1.01 (ref 1–1.03)
TOTAL PROTEIN: 7.9 G/DL (ref 6.3–8)
URINE REFLEX TO CULTURE: NORMAL
UROBILINOGEN, URINE: 0.2 E.U./DL
WBC # BLD: 4.8 K/UL (ref 4.8–10.8)

## 2023-02-12 PROCEDURE — 70498 CT ANGIOGRAPHY NECK: CPT

## 2023-02-12 PROCEDURE — 85025 COMPLETE CBC W/AUTO DIFF WBC: CPT

## 2023-02-12 PROCEDURE — 80053 COMPREHEN METABOLIC PANEL: CPT

## 2023-02-12 PROCEDURE — 87502 INFLUENZA DNA AMP PROBE: CPT

## 2023-02-12 PROCEDURE — 85610 PROTHROMBIN TIME: CPT

## 2023-02-12 PROCEDURE — 96374 THER/PROPH/DIAG INJ IV PUSH: CPT

## 2023-02-12 PROCEDURE — 6360000002 HC RX W HCPCS: Performed by: STUDENT IN AN ORGANIZED HEALTH CARE EDUCATION/TRAINING PROGRAM

## 2023-02-12 PROCEDURE — 99285 EMERGENCY DEPT VISIT HI MDM: CPT

## 2023-02-12 PROCEDURE — 83735 ASSAY OF MAGNESIUM: CPT

## 2023-02-12 PROCEDURE — 85730 THROMBOPLASTIN TIME PARTIAL: CPT

## 2023-02-12 PROCEDURE — 85652 RBC SED RATE AUTOMATED: CPT

## 2023-02-12 PROCEDURE — 81003 URINALYSIS AUTO W/O SCOPE: CPT

## 2023-02-12 PROCEDURE — 96375 TX/PRO/DX INJ NEW DRUG ADDON: CPT

## 2023-02-12 PROCEDURE — 6360000004 HC RX CONTRAST MEDICATION: Performed by: STUDENT IN AN ORGANIZED HEALTH CARE EDUCATION/TRAINING PROGRAM

## 2023-02-12 PROCEDURE — 36415 COLL VENOUS BLD VENIPUNCTURE: CPT

## 2023-02-12 PROCEDURE — 87635 SARS-COV-2 COVID-19 AMP PRB: CPT

## 2023-02-12 PROCEDURE — 70496 CT ANGIOGRAPHY HEAD: CPT

## 2023-02-12 RX ORDER — ACETAMINOPHEN 500 MG
1000 TABLET ORAL EVERY 6 HOURS PRN
Qty: 40 TABLET | Refills: 0 | Status: SHIPPED | OUTPATIENT
Start: 2023-02-12 | End: 2023-02-17

## 2023-02-12 RX ORDER — KETOROLAC TROMETHAMINE 30 MG/ML
30 INJECTION, SOLUTION INTRAMUSCULAR; INTRAVENOUS ONCE
Status: COMPLETED | OUTPATIENT
Start: 2023-02-12 | End: 2023-02-12

## 2023-02-12 RX ORDER — METOCLOPRAMIDE HYDROCHLORIDE 5 MG/ML
10 INJECTION INTRAMUSCULAR; INTRAVENOUS ONCE
Status: COMPLETED | OUTPATIENT
Start: 2023-02-12 | End: 2023-02-12

## 2023-02-12 RX ORDER — DEXAMETHASONE SODIUM PHOSPHATE 4 MG/ML
4 INJECTION, SOLUTION INTRA-ARTICULAR; INTRALESIONAL; INTRAMUSCULAR; INTRAVENOUS; SOFT TISSUE ONCE
Status: COMPLETED | OUTPATIENT
Start: 2023-02-12 | End: 2023-02-12

## 2023-02-12 RX ORDER — PROMETHAZINE HYDROCHLORIDE 25 MG/1
25 TABLET ORAL EVERY 8 HOURS PRN
Qty: 20 TABLET | Refills: 0 | Status: SHIPPED | OUTPATIENT
Start: 2023-02-12

## 2023-02-12 RX ORDER — METOCLOPRAMIDE HYDROCHLORIDE 5 MG/ML
10 INJECTION INTRAMUSCULAR; INTRAVENOUS ONCE
Status: DISCONTINUED | OUTPATIENT
Start: 2023-02-12 | End: 2023-02-12

## 2023-02-12 RX ADMIN — IOPAMIDOL 75 ML: 612 INJECTION, SOLUTION INTRAVENOUS at 15:39

## 2023-02-12 RX ADMIN — METOCLOPRAMIDE 10 MG: 5 INJECTION, SOLUTION INTRAMUSCULAR; INTRAVENOUS at 15:16

## 2023-02-12 RX ADMIN — KETOROLAC TROMETHAMINE 30 MG: 30 INJECTION, SOLUTION INTRAMUSCULAR; INTRAVENOUS at 15:15

## 2023-02-12 RX ADMIN — DEXAMETHASONE SODIUM PHOSPHATE 4 MG: 4 INJECTION, SOLUTION INTRAMUSCULAR; INTRAVENOUS at 15:16

## 2023-02-12 ASSESSMENT — PAIN SCALES - GENERAL
PAINLEVEL_OUTOF10: 8
PAINLEVEL_OUTOF10: 9
PAINLEVEL_OUTOF10: 0

## 2023-02-12 ASSESSMENT — PAIN DESCRIPTION - LOCATION
LOCATION_2: ABDOMEN
LOCATION: HEAD

## 2023-02-12 ASSESSMENT — ENCOUNTER SYMPTOMS
DIARRHEA: 0
TROUBLE SWALLOWING: 0
SHORTNESS OF BREATH: 0
ABDOMINAL PAIN: 0
BACK PAIN: 0
SINUS PRESSURE: 0
VOMITING: 0
COUGH: 0
CHEST TIGHTNESS: 0

## 2023-02-12 ASSESSMENT — LIFESTYLE VARIABLES
HOW MANY STANDARD DRINKS CONTAINING ALCOHOL DO YOU HAVE ON A TYPICAL DAY: PATIENT DOES NOT DRINK
HOW OFTEN DO YOU HAVE A DRINK CONTAINING ALCOHOL: NEVER

## 2023-02-12 ASSESSMENT — PAIN - FUNCTIONAL ASSESSMENT: PAIN_FUNCTIONAL_ASSESSMENT: 0-10

## 2023-02-12 ASSESSMENT — PAIN DESCRIPTION - DESCRIPTORS
DESCRIPTORS_2: ACHING
DESCRIPTORS: THROBBING

## 2023-02-12 ASSESSMENT — PAIN DESCRIPTION - INTENSITY: RATING_2: 9

## 2023-02-12 NOTE — DISCHARGE INSTRUCTIONS
CAT scan of the head and neck with dye shows no brain aneurysm, no brain bleed. If you develop a fever or stiff neck, vomiting unable to drink liquids call 911 or go to nearest emergency room to have repeat evaluation. Call Dr. Therese Russell office tomorrow to schedule follow-up appointment. If headache returns the ER physician wants you to take a tablet of Phenergan that has been prescribed for you. Phenergan has a unique property where it can prevent headaches from coming back for more than 24 hours and may completely get rid of the headache.

## 2023-02-12 NOTE — ED PROVIDER NOTES
[facility] [facility] ED  eMERGENCY dEPARTMENT eNCOUnter      Pt Name: Julianna Cordova  MRN: [id number]  [place] 1995  Date of evaluation: 2/12/2023  Provider: Kim Dunaway, [address] [place] [address]       Chief Complaint   Patient presents with    Headache     Nauseated, abdominal pain x 4-5 days. Came in by ambulance         HISTORY OF PRESENT ILLNESS   (Location/Symptom, Timing/Onset,Context/Setting, Quality, Duration, Modifying Factors, Severity)  Note limiting factors. Julianna Cordova is a 32 y.o. male who presents to the emergency department with c/o headache x4 to 5 days. Stabbing in quality. Patient felt nauseated with it. Patient states this was a little different than his other headaches and it lasted longer. The patient denies any neck stiffness and is able to demonstrate moving his neck. There is no nuchal rigidity on exam.  Patient denies any visual changes. On exam palpation of the right temporal region elicits pain. Patient denies any fever, chills, cough, vomiting or diarrhea. Patient denies any nausea. Patient denies any eye pain. Movement of the eyes does not cause any pain whatsoever. Patient has not taken any medications and reports no alleviating or worsening factors other than touching the side of the temple region pain worse. HPI    NursingNotes were reviewed. REVIEW OF SYSTEMS    (2-9 systems for level 4, 10 or more for level 5)     Review of Systems   Constitutional:  Negative for activity change, appetite change, chills, fever and unexpected weight change. HENT:  Negative for drooling, ear pain, nosebleeds, sinus pressure and trouble swallowing. Respiratory:  Negative for cough, chest tightness and shortness of breath. Cardiovascular:  Negative for chest pain and leg swelling. Gastrointestinal:  Negative for abdominal pain, diarrhea and vomiting. Endocrine: Negative for polydipsia and polyphagia.    Genitourinary:  Negative for dysuria, flank pain and frequency. Musculoskeletal:  Negative for back pain and myalgias. Skin:  Negative for pallor and rash. Neurological:  Positive for headaches. Negative for syncope and weakness. Hematological:  Does not bruise/bleed easily. All other systems reviewed and are negative. Except as noted above the remainder of the review of systems was reviewed and negative. PAST MEDICAL HISTORY     Past Medical History:   Diagnosis Date    ADHD (attention deficit hyperactivity disorder) 2012    Allergic rhinitis 2012    Autism     Hypertension     Obesity (BMI 30-39.9) 2012    Sleep apnea          SURGICALHISTORY       Past Surgical History:   Procedure Laterality Date    TONSILLECTOMY           CURRENT MEDICATIONS       Previous Medications    AMLODIPINE (NORVASC) 10 MG TABLET    TAKE 1 TABLET BY MOUTH EVERY DAY    BLOOD PRESSURE MONITORING (ADULT BLOOD PRESSURE CUFF LG) KIT    Check blood pressure every other day    CETIRIZINE (ZYRTEC) 10 MG TABLET    Take 1 tablet by mouth daily    COLCHICINE (COLCRYS) 0.6 MG TABLET    1 tab BID    HYDROCHLOROTHIAZIDE (HYDRODIURIL) 25 MG TABLET    TAKE 1 TABLET BY MOUTH EVERY MORNING    INGREZZA 80 MG CAPS    TAKE 1 CAPSULE BY MOUTH DAILY    LOSARTAN (COZAAR) 100 MG TABLET    TAKE 1 TABLET BY MOUTH EVERY DAY    OXCARBAZEPINE (TRILEPTAL) 300 MG TABLET    Take 1 tablet by mouth daily       ALLERGIES     Patient has no known allergies.     FAMILY HISTORY       Family History   Problem Relation Age of Onset    No Known Problems Mother     No Known Problems Father           SOCIAL HISTORY       Social History     Socioeconomic History    Marital status: Single     Spouse name: None    Number of children: None    Years of education: None    Highest education level: None   Tobacco Use    Smoking status: Former     Packs/day: 0.50     Years: 7.00     Pack years: 3.50     Types: Cigarettes     Start date: 2013     Quit date: 2018     Years since quittin.7 Smokeless tobacco: Never   Vaping Use    Vaping Use: Never used   Substance and Sexual Activity    Alcohol use: Not Currently     Comment: occ    Drug use: No    Sexual activity: Not Currently       SCREENINGS    [place] Coma Scale  Eye Opening: Spontaneous  Best Verbal Response: Oriented  Best Motor Response: Obeys commands  Codie Coma Scale Score: 15 @FLOW([id number])@      PHYSICAL EXAM    (up to 7 for level 4, 8 or more for level 5)     ED Triage Vitals [02/12/23 0935]   BP Temp Temp Source Heart Rate Resp SpO2 Height Weight   (!) 160/101 98.7 °F (37.1 °C) Oral (!) 102 22 96 % 5' 7\" (1.702 m) (!) 368 lb (166.9 kg)       Physical Exam  Vitals and nursing note reviewed. Constitutional:       General: He is awake. He is in acute distress. Appearance: Normal appearance. He is well-developed and normal weight. He is not ill-appearing, toxic-appearing or diaphoretic. Comments: No photophobia. No phonophobia. HENT:      Head: Normocephalic and atraumatic. No Russell's sign. Right Ear: External ear normal.      Left Ear: External ear normal.      Ears:      Comments: Right temporal tenderness     Nose: Nose normal. No congestion or rhinorrhea. Mouth/Throat:      Mouth: Mucous membranes are moist.      Pharynx: Oropharynx is clear. No oropharyngeal exudate or posterior oropharyngeal erythema. Eyes:      General: No scleral icterus. Right eye: No foreign body or discharge. Left eye: No discharge. Extraocular Movements: Extraocular movements intact. Conjunctiva/sclera: Conjunctivae normal.      Left eye: No exudate. Pupils: Pupils are equal, round, and reactive to light. Neck:      Vascular: No JVD. Trachea: No tracheal deviation. Comments: No meningismus. Cardiovascular:      Rate and Rhythm: Normal rate and regular rhythm. Pulses: Normal pulses. Heart sounds: Normal heart sounds. Heart sounds not distant. No murmur heard. No friction rub.  No gallop. Pulmonary:      Effort: Pulmonary effort is normal. No respiratory distress. Breath sounds: Normal breath sounds. No stridor. No wheezing, rhonchi or rales. Chest:      Chest wall: No tenderness. Abdominal:      General: Abdomen is flat. Bowel sounds are normal. There is no distension. Palpations: Abdomen is soft. There is no mass. Tenderness: There is no abdominal tenderness. There is no right CVA tenderness, left CVA tenderness, guarding or rebound. Hernia: No hernia is present. Musculoskeletal:         General: No swelling, tenderness, deformity or signs of injury. Normal range of motion. Cervical back: Normal range of motion and neck supple. No rigidity. Lymphadenopathy:      Head:      Right side of head: No submental adenopathy. Left side of head: No submental adenopathy. Skin:     General: Skin is warm and dry. Capillary Refill: Capillary refill takes less than 2 seconds. Coloration: Skin is not jaundiced or pale. Findings: No bruising, erythema, lesion or rash. Neurological:      General: No focal deficit present. Mental Status: He is alert and oriented to person, place, and time. Mental status is at baseline. Cranial Nerves: No cranial nerve deficit. Sensory: No sensory deficit. Motor: No weakness. Coordination: Coordination normal.      Deep Tendon Reflexes: Reflexes are normal and symmetric. Psychiatric:         Mood and Affect: Mood normal.         Behavior: Behavior normal. Behavior is cooperative. Thought Content:  Thought content normal.         Judgment: Judgment normal.       DIAGNOSTIC RESULTS     EKG: All EKG's are interpreted by the Emergency Department Physician who either signs or Co-signsthis chart in the absence of a cardiologist.        RADIOLOGY:   Non-plain filmimages such as CT, Ultrasound and MRI are read by the radiologist. Plain radiographic images are visualized and preliminarily interpreted by the emergency physician with the below findings:        Interpretation per the Radiologist below, if available at the time ofthis note:     [address]   Final Result   1. No acute intracranial abnormality. 2. Unremarkable CTA of the head and neck. CTA NECK W WO CONTRAST   Final Result   1. No acute intracranial abnormality. 2. Unremarkable CTA of the head and neck. ED BEDSIDE ULTRASOUND:   Performed by ED Physician - none    LABS:  Labs Reviewed   COMPREHENSIVE METABOLIC PANEL - Abnormal; Notable for the following components:       Result Value    Glucose 209 (*)     All other components within normal limits   POCT GLUCOSE - Abnormal; Notable for the following components:    POC Glucose 156 (*)     All other components within normal limits   RAPID INFLUENZA A/B ANTIGENS   COVID-19, RAPID   URINALYSIS WITH REFLEX TO CULTURE   SEDIMENTATION RATE   CBC WITH AUTO DIFFERENTIAL   MAGNESIUM   PROTIME-INR   APTT       All other labs were within normal range or not returned as of this dictation. EMERGENCY DEPARTMENT COURSE and DIFFERENTIAL DIAGNOSIS/MDM:   Vitals:    Vitals:    02/12/23 0935 02/12/23 1400 02/12/23 1500 02/12/23 1626   BP: (!) 160/101 (!) 178/85 (!) 147/68 (!) 159/66   Pulse: (!) 102 98 95 93   Resp: 22 22 20 20   Temp: 98.7 °F (37.1 °C)      TempSrc: Oral      SpO2: 96% 99%  99%   Weight: (!) 368 lb (166.9 kg)      Height: 5' 7\" (1.702 m)              MDM  CT angio of the head is unremarkable. Patient was given some IV medications for headache. The ER physician spoke with the patient's mother and explained that we would do a CT angio as well as a CT so that we can make sure there is no leaking aneurysm. The patient states on reexam his headache is completely gone. No narcotics were given. This is likely some type of atypical migrainous headache. Patient was excluded by the angio study. No bleed.   Patient has no meningismal signs just a meningitis. Patient is to follow-up with his neurologist in outpatient basis if worsening symptoms return to the nearest emergency room. CONSULTS:  None    PROCEDURES:  Unless otherwise noted below, none     Procedures    FINAL IMPRESSION      1. Acute nonintractable headache, unspecified headache type          DISPOSITION/PLAN   DISPOSITION Decision To Discharge 02/12/2023 05:06:04 PM      PATIENT REFERRED TO:  Ashley Quinn  [address]  [id number]  [place] [address]  [phone]    In 1 day      Tracy Smith MD  [address] [place] [address]  [address]  [address] [facility]  [phone]    Call in 1 day      DISCHARGE MEDICATIONS:  New Prescriptions    ACETAMINOPHEN (TYLENOL) 500 MG TABLET    Take 2 tablets by mouth every 6 hours as needed for Pain    PROMETHAZINE (PHENERGAN) 25 MG TABLET    Take 1 tablet by mouth every 8 hours as needed for Nausea (Headache) WARNING:  May cause drowsiness. May impair ability to operate vehicles or machinery. Do not use in combination with alcohol.           (Please note that portions of this note were completed with a voice recognition program.  Efforts were made to edit the dictations but occasionally words are mis-transcribed.)    Walter Garcia DO (electronically signed)  Attending Emergency Physician         Walter Garcia DO  02/12/23 1029

## 2023-02-12 NOTE — ED NOTES
Very dilute, barely colored, if any, clear urine obtained and sent at this time     Penny Morgan RN  02/12/23 5782

## 2023-02-12 NOTE — ED TRIAGE NOTES
A & Ox4. Skin warm and dry. Pt arrived via [facility] to waiting room. States he has had a headache for 4-5 days all over his head. States is nauseated but no vomiting or diarrhea. Complains of entire abdomen hurting.

## 2023-02-17 ENCOUNTER — OFFICE VISIT (OUTPATIENT)
Dept: NEUROLOGY | Age: 28
End: 2023-02-17
Payer: MEDICARE

## 2023-02-17 VITALS
HEART RATE: 94 BPM | DIASTOLIC BLOOD PRESSURE: 86 MMHG | WEIGHT: 315 LBS | SYSTOLIC BLOOD PRESSURE: 145 MMHG | BODY MASS INDEX: 61.24 KG/M2

## 2023-02-17 DIAGNOSIS — M62.82 NON-TRAUMATIC RHABDOMYOLYSIS: ICD-10-CM

## 2023-02-17 DIAGNOSIS — R74.8 ABNORMAL CPK: ICD-10-CM

## 2023-02-17 DIAGNOSIS — G47.33 OBSTRUCTIVE SLEEP APNEA SYNDROME: ICD-10-CM

## 2023-02-17 DIAGNOSIS — F84.0 AUTISTIC DISORDER: ICD-10-CM

## 2023-02-17 DIAGNOSIS — G24.01 DRUG-INDUCED SUBACUTE DYSKINESIA: Primary | ICD-10-CM

## 2023-02-17 PROCEDURE — G8484 FLU IMMUNIZE NO ADMIN: HCPCS | Performed by: PSYCHIATRY & NEUROLOGY

## 2023-02-17 PROCEDURE — 1036F TOBACCO NON-USER: CPT | Performed by: PSYCHIATRY & NEUROLOGY

## 2023-02-17 PROCEDURE — G8417 CALC BMI ABV UP PARAM F/U: HCPCS | Performed by: PSYCHIATRY & NEUROLOGY

## 2023-02-17 PROCEDURE — 99214 OFFICE O/P EST MOD 30 MIN: CPT | Performed by: PSYCHIATRY & NEUROLOGY

## 2023-02-17 PROCEDURE — G8427 DOCREV CUR MEDS BY ELIG CLIN: HCPCS | Performed by: PSYCHIATRY & NEUROLOGY

## 2023-02-17 RX ORDER — INDOMETHACIN 50 MG/1
50 CAPSULE ORAL 2 TIMES DAILY WITH MEALS
COMMUNITY

## 2023-02-17 RX ORDER — ALLOPURINOL 100 MG/1
100 TABLET ORAL DAILY
COMMUNITY

## 2023-02-17 RX ORDER — METFORMIN HYDROCHLORIDE 750 MG/1
750 TABLET, EXTENDED RELEASE ORAL
COMMUNITY

## 2023-02-17 ASSESSMENT — ENCOUNTER SYMPTOMS
VOMITING: 0
NAUSEA: 0
CHOKING: 0
BACK PAIN: 0
SHORTNESS OF BREATH: 0
PHOTOPHOBIA: 0
TROUBLE SWALLOWING: 0
COLOR CHANGE: 0

## 2023-02-17 NOTE — PROGRESS NOTES
Subjective:      Patient ID: Michelle Wagner is a 32 y.o. male who presents today for:  Chief Complaint   Patient presents with    Follow-up     Pt states right now his diet is his only concern. Pt feels pretty good and doesn't have any concerns at this time. HPI 32 right-handed gentleman now with a history of kidney disease. Patient was previously on Abilify and Merrily Hammers which was discontinued. Patient started on Cogentin and Artane and we then introduced Ingrezza. Had Austedo as well. .  Eventually been back onto [place] and this is helping. Past she has elevated CPK levels and we have repeated this. Referred him to Dr. Giovanny Victor in this case he did not need to see him given he is improving. Patient is now on Trileptal he has no concerns except for the diet had patient's only issues obesity his weight is now on metformin.   He has history of snoring but no sleep apnea as described  Past Medical History:   Diagnosis Date    ADHD (attention deficit hyperactivity disorder) 2012    Allergic rhinitis 2012    Autism     Hypertension     Obesity (BMI 30-39.9) 2012    Sleep apnea      Past Surgical History:   Procedure Laterality Date    TONSILLECTOMY       Social History     Socioeconomic History    Marital status: Single     Spouse name: Not on file    Number of children: Not on file    Years of education: Not on file    Highest education level: Not on file   Occupational History    Not on file   Tobacco Use    Smoking status: Former     Packs/day: 0.50     Years: 7.00     Pack years: 3.50     Types: Cigarettes     Start date: 2013     Quit date: 2018     Years since quittin.7    Smokeless tobacco: Never   Vaping Use    Vaping Use: Never used   Substance and Sexual Activity    Alcohol use: Not Currently     Comment: occ    Drug use: No    Sexual activity: Not Currently   Other Topics Concern    Not on file   Social History Narrative    Not on file     Social Determinants of Health     Financial Resource Strain: Not on file   Food Insecurity: Not on file   Transportation Needs: Not on file   Physical Activity: Not on file   Stress: Not on file   Social Connections: Not on file   Intimate Partner Violence: Not on file   Housing Stability: Not on file     Family History   Problem Relation Age of Onset    No Known Problems Mother     No Known Problems Father      No Known Allergies    Current Outpatient Medications   Medication Sig Dispense Refill    allopurinol (ZYLOPRIM) 100 MG tablet Take 100 mg by mouth daily      indomethacin (INDOCIN) 50 MG capsule Take 50 mg by mouth 2 times daily (with meals)      metFORMIN (GLUCOPHAGE-XR) 750 MG extended release tablet Take 750 mg by mouth daily (with breakfast)      acetaminophen (TYLENOL) 500 MG tablet Take 2 tablets by mouth every 6 hours as needed for Pain 40 tablet 0    INGREZZA 80 MG CAPS TAKE 1 CAPSULE BY MOUTH DAILY 30 capsule 3    colchicine (COLCRYS) 0.6 MG tablet 1 tab BID 60 tablet 0    cetirizine (ZYRTEC) 10 MG tablet Take 1 tablet by mouth daily 30 tablet 0    OXcarbazepine (TRILEPTAL) 300 MG tablet Take 1 tablet by mouth daily (Patient taking differently: Take 150 mg by mouth 2 times daily) 30 tablet 2    amLODIPine (NORVASC) 10 MG tablet TAKE 1 TABLET BY MOUTH EVERY DAY 90 tablet 1    hydroCHLOROthiazide (HYDRODIURIL) 25 MG tablet TAKE 1 TABLET BY MOUTH EVERY MORNING 90 tablet 1    losartan (COZAAR) 100 MG tablet TAKE 1 TABLET BY MOUTH EVERY DAY 90 tablet 1    Blood Pressure Monitoring (ADULT BLOOD PRESSURE CUFF LG) KIT Check blood pressure every other day 1 kit 0    promethazine (PHENERGAN) 25 MG tablet Take 1 tablet by mouth every 8 hours as needed for Nausea (Headache) WARNING:  May cause drowsiness. May impair ability to operate vehicles or machinery. Do not use in combination with alcohol. (Patient not taking: Reported on 2/17/2023) 20 tablet 0     No current facility-administered medications for this visit.          Review of Systems   Constitutional:  Negative for fever. HENT:  Negative for ear pain, tinnitus and trouble swallowing. Eyes:  Negative for photophobia and visual disturbance. Respiratory:  Negative for choking and shortness of breath. Cardiovascular:  Negative for chest pain and palpitations. Gastrointestinal:  Negative for nausea and vomiting. Musculoskeletal:  Negative for back pain, gait problem, joint swelling, myalgias, neck pain and neck stiffness. Skin:  Negative for color change. Allergic/Immunologic: Negative for food allergies. Neurological:  Negative for dizziness, tremors, seizures, syncope, facial asymmetry, speech difficulty, weakness, light-headedness, numbness and headaches. Psychiatric/Behavioral:  Negative for behavioral problems, confusion, hallucinations and sleep disturbance. Objective:   BP (!) 145/86 (Site: Right Upper Arm)   Pulse 94   Wt (!) 391 lb (177.4 kg)   BMI 61.24 kg/m²     Physical Exam  Vitals reviewed. Eyes:      Pupils: Pupils are equal, round, and reactive to light. Cardiovascular:      Rate and Rhythm: Normal rate and regular rhythm. Heart sounds: No murmur heard. Pulmonary:      Effort: Pulmonary effort is normal.      Breath sounds: Normal breath sounds. Abdominal:      General: Bowel sounds are normal.   Musculoskeletal:         General: Normal range of motion. Cervical back: Normal range of motion. Skin:     General: Skin is warm. Neurological:      Mental Status: He is alert and oriented to person, place, and time. Cranial Nerves: No cranial nerve deficit. Sensory: No sensory deficit. Motor: No abnormal muscle tone. Coordination: Coordination normal.      Deep Tendon Reflexes: Reflexes are normal and symmetric. Babinski sign absent on the right side. Babinski sign absent on the left side.    Psychiatric:         Mood and Affect: Mood normal.     Patient has 3+ pedal edema and he is areflexic in the lower extremity with 1+ reflex in the upper extremity and absolutely no dyskinesias are noted today  CTA HEAD W WO CONTRAST    Result Date: 2/12/2023  EXAMINATION: CTA OF THE NECK; CTA OF THE HEAD WITHOUT AND WITH CONTRAST 2/12/2023 3:29 pm: TECHNIQUE: CTA of the neck was performed with the administration of intravenous contrast. Multiplanar reformatted images are provided for review. MIP images are provided for review. Stenosis of the internal carotid arteries measured using NASCET criteria. Automated exposure control, iterative reconstruction, and/or weight based adjustment of the mA/kV was utilized to reduce the radiation dose to as low as reasonably achievable.; CTA of the head/brain was performed without and with the administration of intravenous contrast. Multiplanar reformatted images are provided for review. MIP images are provided for review. Automated exposure control, iterative reconstruction, and/or weight based adjustment of the mA/kV was utilized to reduce the radiation dose to as low as reasonably achievable. Noncontrast CT of the head with reconstructed 2-D images are also provided for review. COMPARISON: None. HISTORY: ORDERING SYSTEM PROVIDED HISTORY: severe right sided temporal headache/ aneurysm? carotid dissection? TECHNOLOGIST PROVIDED HISTORY: Reason for exam:->severe right sided temporal headache/ aneurysm? carotid dissection? Decision Support Exception - unselect if not a suspected or confirmed emergency medical condition->Emergency Medical Condition (MA) What reading provider will be dictating this exam?->CRC FINDINGS: The exam is suboptimal due to patient body habitus. CT HEAD: BRAIN/VENTRICLES:  No acute intracranial hemorrhage or extraaxial fluid collection. Grey-white differentiation is maintained. No evidence of mass, mass effect or midline shift. No evidence of hydrocephalus. ORBITS: The visualized portion of the orbits demonstrate no acute abnormality.  SINUSES:  The visualized paranasal sinuses and mastoid air cells demonstrate no acute abnormality. SOFT TISSUES/SKULL: No acute abnormality of the visualized skull or soft tissues. CTA NECK: AORTIC ARCH/ARCH VESSELS: No dissection or arterial injury. No significant stenosis of the brachiocephalic or subclavian arteries. CAROTID ARTERIES: No dissection, arterial injury, or hemodynamically significant stenosis by NASCET criteria. VERTEBRAL ARTERIES: No dissection, arterial injury, or significant stenosis. SOFT TISSUES: The lung apices are clear. No cervical or superior mediastinal lymphadenopathy. The larynx and pharynx are unremarkable. No acute abnormality of the salivary and thyroid glands. BONES: No acute osseous abnormality. CTA HEAD: ANTERIOR CIRCULATION: No significant stenosis of the intracranial internal carotid, anterior cerebral, or middle cerebral arteries. No aneurysm. POSTERIOR CIRCULATION: No significant stenosis of the vertebral, basilar, or posterior cerebral arteries. No aneurysm. OTHER: No dural venous sinus thrombosis on this non-dedicated study. 1. No acute intracranial abnormality. 2. Unremarkable CTA of the head and neck. CTA NECK W WO CONTRAST    Result Date: 2/12/2023  EXAMINATION: CTA OF THE NECK; CTA OF THE HEAD WITHOUT AND WITH CONTRAST 2/12/2023 3:29 pm: TECHNIQUE: CTA of the neck was performed with the administration of intravenous contrast. Multiplanar reformatted images are provided for review. MIP images are provided for review. Stenosis of the internal carotid arteries measured using NASCET criteria. Automated exposure control, iterative reconstruction, and/or weight based adjustment of the mA/kV was utilized to reduce the radiation dose to as low as reasonably achievable.; CTA of the head/brain was performed without and with the administration of intravenous contrast. Multiplanar reformatted images are provided for review. MIP images are provided for review.  Automated exposure control, iterative reconstruction, and/or weight based adjustment of the mA/kV was utilized to reduce the radiation dose to as low as reasonably achievable. Noncontrast CT of the head with reconstructed 2-D images are also provided for review. COMPARISON: None. HISTORY: ORDERING SYSTEM PROVIDED HISTORY: severe right sided temporal headache/ aneurysm? carotid dissection? TECHNOLOGIST PROVIDED HISTORY: Reason for exam:->severe right sided temporal headache/ aneurysm? carotid dissection? Decision Support Exception - unselect if not a suspected or confirmed emergency medical condition->Emergency Medical Condition (MA) What reading provider will be dictating this exam?->CRC FINDINGS: The exam is suboptimal due to patient body habitus. CT HEAD: BRAIN/VENTRICLES:  No acute intracranial hemorrhage or extraaxial fluid collection. Grey-white differentiation is maintained. No evidence of mass, mass effect or midline shift. No evidence of hydrocephalus. ORBITS: The visualized portion of the orbits demonstrate no acute abnormality. SINUSES:  The visualized paranasal sinuses and mastoid air cells demonstrate no acute abnormality. SOFT TISSUES/SKULL: No acute abnormality of the visualized skull or soft tissues. CTA NECK: AORTIC ARCH/ARCH VESSELS: No dissection or arterial injury. No significant stenosis of the brachiocephalic or subclavian arteries. CAROTID ARTERIES: No dissection, arterial injury, or hemodynamically significant stenosis by NASCET criteria. VERTEBRAL ARTERIES: No dissection, arterial injury, or significant stenosis. SOFT TISSUES: The lung apices are clear. No cervical or superior mediastinal lymphadenopathy. The larynx and pharynx are unremarkable. No acute abnormality of the salivary and thyroid glands. BONES: No acute osseous abnormality. CTA HEAD: ANTERIOR CIRCULATION: No significant stenosis of the intracranial internal carotid, anterior cerebral, or middle cerebral arteries. No aneurysm.  POSTERIOR CIRCULATION: No significant stenosis of the vertebral, basilar, or posterior cerebral arteries. No aneurysm. OTHER: No dural venous sinus thrombosis on this non-dedicated study.     1. No acute intracranial abnormality. 2. Unremarkable CTA of the head and neck.       Lab Results   Component Value Date/Time    WBC 4.8 02/12/2023 02:15 PM    RBC 4.98 02/12/2023 02:15 PM    HGB 14.2 02/12/2023 02:15 PM    HCT 42.7 02/12/2023 02:15 PM    MCV 85.8 02/12/2023 02:15 PM    MCH 28.5 02/12/2023 02:15 PM    MCHC 33.2 02/12/2023 02:15 PM    RDW 12.9 02/12/2023 02:15 PM     02/12/2023 02:15 PM     Lab Results   Component Value Date/Time     02/12/2023 02:15 PM    K 4.0 02/12/2023 02:15 PM    K 3.8 02/21/2022 06:23 AM    CL 95 02/12/2023 02:15 PM    CO2 28 02/12/2023 02:15 PM    BUN 15 02/12/2023 02:15 PM    CREATININE 1.1 02/12/2023 03:07 PM    CREATININE 1.02 02/12/2023 02:15 PM    GFRAA >60.0 06/10/2022 10:29 AM    LABGLOM >60 02/12/2023 03:07 PM    GLUCOSE 209 02/12/2023 02:15 PM    PROT 7.9 02/12/2023 02:15 PM    LABALBU 4.5 02/12/2023 02:15 PM    CALCIUM 9.4 02/12/2023 02:15 PM    BILITOT 0.6 02/12/2023 02:15 PM    ALKPHOS 75 02/12/2023 02:15 PM    AST 29 02/12/2023 02:15 PM    ALT 37 02/12/2023 02:15 PM     Lab Results   Component Value Date/Time    PROTIME 13.4 02/12/2023 03:27 PM    INR 1.0 02/12/2023 03:27 PM     Lab Results   Component Value Date/Time    TSH 1.120 01/25/2022 06:30 PM    VYAKEVDD13 684 10/14/2021 03:14 PM    FOLATE 18.0 10/14/2021 03:14 PM     Lab Results   Component Value Date/Time    TRIG 109 08/03/2020 02:59 PM    HDL 58 10/14/2021 03:14 PM    LDLCALC 90 10/14/2021 03:14 PM     Lab Results   Component Value Date/Time    LABAMPH Neg 02/15/2022 10:30 PM    BARBSCNU Neg 02/15/2022 10:30 PM    LABBENZ POSITIVE 02/15/2022 10:30 PM    LABMETH Neg 02/15/2022 10:30 PM    OPIATESCREENURINE Neg 02/15/2022 10:30 PM    PHENCYCLIDINESCREENURINE Neg 02/15/2022 10:30 PM    ETOH <10 02/15/2022 10:30 PM     Lab  Results   Component Value Date/Time    VALPROATE 3.9 01/25/2022 06:30 PM       Assessment:       Diagnosis Orders   1. Drug-induced subacute dyskinesia        2. Non-traumatic rhabdomyolysis        3. Autistic disorder        4. Abnormal CPK        5. Obstructive sleep apnea syndrome        Drug induced dyskinesias related to previous use of Abilify and Latuda. Patient was discontinued the medication on Trileptal and doing quite well. We had seen him for dyskinesias and tried him initially on Ingrezza. He did not respond to start up. He thought he was not responding to [place] of [place] although eventually he has responded and he is doing very well on the medication. There is no side effects. Patient actually is not any dyskinesias since we started the medication. He has some minor orofacial dyskinesias which we did not see today. Patient does have mildly elevated CPK levels which may be high CPK level seen in -American males. This is in the range of less than 400. This does not require intervention some of this can be from agitation as well. He is not any bipolar traits or issues and is supposed to be on Trileptal although this was not quite clear. We will review him in 6 months and earlier if there are any concerns. Monitoring his Ingrezza with the pre-CERT required from his insurance companies    Melquiades Ruano MD, Luis Eduardo Rodriguez, American Board of Psychiatry & Neurology  Board Certified in Vascular Neurology  Board Certified in Neuromuscular Medicine  Certified in [facility]:      No orders of the defined types were placed in this encounter. No orders of the defined types were placed in this encounter. No follow-ups on file.       Valente Quintero MD

## 2023-02-28 RX ORDER — VALBENAZINE 80 MG/1
CAPSULE ORAL
Qty: 30 CAPSULE | Refills: 3 | Status: ACTIVE | OUTPATIENT
Start: 2023-02-28

## 2023-02-28 NOTE — TELEPHONE ENCOUNTER
Pharmacy is requesting medication refill.  Please approve or deny this request.    Rx requested:  Requested Prescriptions     Pending Prescriptions Disp Refills    Valbenazine Tosylate (INGREZZA) 80 MG CAPS 30 capsule 3     Sig: TAKE 1 CAPSULE BY MOUTH DAILY         Last Office Visit:   2/17/2023      Next Visit Date:  Future Appointments   Date Time Provider Merle Robin   8/18/2023 11:00 AM Melquiades Woodall  [facility] -

## 2023-03-29 ENCOUNTER — APPOINTMENT (OUTPATIENT)
Dept: GENERAL RADIOLOGY | Age: 28
End: 2023-03-29
Payer: MEDICARE

## 2023-03-29 ENCOUNTER — HOSPITAL ENCOUNTER (EMERGENCY)
Age: 28
Discharge: HOME | End: 2023-03-29
Attending: STUDENT IN AN ORGANIZED HEALTH CARE EDUCATION/TRAINING PROGRAM
Payer: MEDICARE

## 2023-03-29 VITALS
HEART RATE: 104 BPM | TEMPERATURE: 98.3 F | HEIGHT: 67 IN | DIASTOLIC BLOOD PRESSURE: 86 MMHG | WEIGHT: 315 LBS | SYSTOLIC BLOOD PRESSURE: 174 MMHG | RESPIRATION RATE: 28 BRPM | BODY MASS INDEX: 49.44 KG/M2 | OXYGEN SATURATION: 100 %

## 2023-03-29 DIAGNOSIS — J45.901 EXACERBATION OF ASTHMA, UNSPECIFIED ASTHMA SEVERITY, UNSPECIFIED WHETHER PERSISTENT: ICD-10-CM

## 2023-03-29 DIAGNOSIS — R07.81 PLEURODYNIA: Primary | ICD-10-CM

## 2023-03-29 DIAGNOSIS — J06.9 VIRAL URI: ICD-10-CM

## 2023-03-29 DIAGNOSIS — R06.02 SHORTNESS OF BREATH: ICD-10-CM

## 2023-03-29 LAB
ALBUMIN SERPL-MCNC: 4.8 G/DL (ref 3.5–4.6)
ALP SERPL-CCNC: 77 U/L (ref 35–104)
ALT SERPL-CCNC: 29 U/L (ref 0–41)
ANION GAP SERPL CALCULATED.3IONS-SCNC: 11 MEQ/L (ref 9–15)
AST SERPL-CCNC: 20 U/L (ref 0–40)
BASOPHILS # BLD: 0 K/UL (ref 0–0.2)
BASOPHILS NFR BLD: 0.4 %
BILIRUB SERPL-MCNC: 0.9 MG/DL (ref 0.2–0.7)
BNP BLD-MCNC: <5 PG/ML
BUN SERPL-MCNC: 15 MG/DL (ref 6–20)
CALCIUM SERPL-MCNC: 9.7 MG/DL (ref 8.5–9.9)
CHLORIDE SERPL-SCNC: 100 MEQ/L (ref 95–107)
CO2 SERPL-SCNC: 30 MEQ/L (ref 20–31)
CREAT SERPL-MCNC: 0.97 MG/DL (ref 0.7–1.2)
EOSINOPHIL # BLD: 0.1 K/UL (ref 0–0.7)
EOSINOPHIL NFR BLD: 1 %
ERYTHROCYTE [DISTWIDTH] IN BLOOD BY AUTOMATED COUNT: 13.2 % (ref 11.5–14.5)
GLOBULIN SER CALC-MCNC: 3.2 G/DL (ref 2.3–3.5)
GLUCOSE SERPL-MCNC: 144 MG/DL (ref 70–99)
HCT VFR BLD AUTO: 44.2 % (ref 42–52)
HGB BLD-MCNC: 14.6 G/DL (ref 14–18)
LACTATE BLDV-SCNC: 1.6 MMOL/L (ref 0.5–2.2)
LIPASE SERPL-CCNC: 20 U/L (ref 12–95)
LYMPHOCYTES # BLD: 1.1 K/UL (ref 1–4.8)
LYMPHOCYTES NFR BLD: 10.9 %
MAGNESIUM SERPL-MCNC: 1.8 MG/DL (ref 1.7–2.4)
MCH RBC QN AUTO: 28.1 PG (ref 27–31.3)
MCHC RBC AUTO-ENTMCNC: 33 % (ref 33–37)
MCV RBC AUTO: 85.2 FL (ref 79–92.2)
MONOCYTES # BLD: 1 K/UL (ref 0.2–0.8)
MONOCYTES NFR BLD: 9.7 %
NEUTROPHILS # BLD: 8.1 K/UL (ref 1.4–6.5)
NEUTS SEG NFR BLD: 78 %
PLATELET # BLD AUTO: 257 K/UL (ref 130–400)
POTASSIUM SERPL-SCNC: 4 MEQ/L (ref 3.4–4.9)
PROT SERPL-MCNC: 8 G/DL (ref 6.3–8)
RBC # BLD AUTO: 5.19 M/UL (ref 4.7–6.1)
SARS-COV-2 RDRP RESP QL NAA+PROBE: NOT DETECTED
SODIUM SERPL-SCNC: 141 MEQ/L (ref 135–144)
TROPONIN T SERPL-MCNC: <0.01 NG/ML (ref 0–0.01)
WBC # BLD AUTO: 10.4 K/UL (ref 4.8–10.8)

## 2023-03-29 PROCEDURE — 84484 ASSAY OF TROPONIN QUANT: CPT

## 2023-03-29 PROCEDURE — 93005 ELECTROCARDIOGRAM TRACING: CPT

## 2023-03-29 PROCEDURE — 71045 X-RAY EXAM CHEST 1 VIEW: CPT

## 2023-03-29 PROCEDURE — 6370000000 HC RX 637 (ALT 250 FOR IP): Performed by: STUDENT IN AN ORGANIZED HEALTH CARE EDUCATION/TRAINING PROGRAM

## 2023-03-29 PROCEDURE — 87635 SARS-COV-2 COVID-19 AMP PRB: CPT

## 2023-03-29 PROCEDURE — 99285 EMERGENCY DEPT VISIT HI MDM: CPT

## 2023-03-29 PROCEDURE — 94640 AIRWAY INHALATION TREATMENT: CPT

## 2023-03-29 PROCEDURE — 83605 ASSAY OF LACTIC ACID: CPT

## 2023-03-29 PROCEDURE — 80053 COMPREHEN METABOLIC PANEL: CPT

## 2023-03-29 PROCEDURE — 83690 ASSAY OF LIPASE: CPT

## 2023-03-29 PROCEDURE — 36415 COLL VENOUS BLD VENIPUNCTURE: CPT

## 2023-03-29 PROCEDURE — 83880 ASSAY OF NATRIURETIC PEPTIDE: CPT

## 2023-03-29 PROCEDURE — 83735 ASSAY OF MAGNESIUM: CPT

## 2023-03-29 PROCEDURE — 85025 COMPLETE CBC W/AUTO DIFF WBC: CPT

## 2023-03-29 RX ORDER — ONDANSETRON 2 MG/ML
4 INJECTION INTRAMUSCULAR; INTRAVENOUS ONCE
Status: DISCONTINUED | OUTPATIENT
Start: 2023-03-29 | End: 2023-03-29

## 2023-03-29 RX ORDER — GUAIFENESIN/DEXTROMETHORPHAN 100-10MG/5
5 SYRUP ORAL ONCE
Status: COMPLETED | OUTPATIENT
Start: 2023-03-29 | End: 2023-03-29

## 2023-03-29 RX ORDER — GUAIFENESIN/DEXTROMETHORPHAN 100-10MG/5
5 SYRUP ORAL 3 TIMES DAILY PRN
Qty: 120 ML | Refills: 0 | Status: SHIPPED | OUTPATIENT
Start: 2023-03-29 | End: 2023-04-08

## 2023-03-29 RX ORDER — PREDNISONE 20 MG/1
60 TABLET ORAL ONCE
Status: COMPLETED | OUTPATIENT
Start: 2023-03-29 | End: 2023-03-29

## 2023-03-29 RX ORDER — 0.9 % SODIUM CHLORIDE 0.9 %
1000 INTRAVENOUS SOLUTION INTRAVENOUS ONCE
Status: DISCONTINUED | OUTPATIENT
Start: 2023-03-29 | End: 2023-03-29

## 2023-03-29 RX ORDER — PREDNISONE 20 MG/1
60 TABLET ORAL DAILY
Qty: 15 TABLET | Refills: 0 | Status: SHIPPED | OUTPATIENT
Start: 2023-03-29 | End: 2023-04-03

## 2023-03-29 RX ORDER — METHYLPREDNISOLONE SODIUM SUCCINATE 125 MG/2ML
125 INJECTION, POWDER, LYOPHILIZED, FOR SOLUTION INTRAMUSCULAR; INTRAVENOUS ONCE
Status: DISCONTINUED | OUTPATIENT
Start: 2023-03-29 | End: 2023-03-29

## 2023-03-29 RX ORDER — ACETAMINOPHEN 500 MG
1000 TABLET ORAL ONCE
Status: COMPLETED | OUTPATIENT
Start: 2023-03-29 | End: 2023-03-29

## 2023-03-29 RX ORDER — ONDANSETRON 4 MG/1
4 TABLET, ORALLY DISINTEGRATING ORAL ONCE
Status: COMPLETED | OUTPATIENT
Start: 2023-03-29 | End: 2023-03-29

## 2023-03-29 RX ORDER — DOXYCYCLINE HYCLATE 100 MG
100 TABLET ORAL 2 TIMES DAILY
Qty: 10 TABLET | Refills: 0 | Status: SHIPPED | OUTPATIENT
Start: 2023-03-29 | End: 2023-04-03

## 2023-03-29 RX ORDER — ACETAMINOPHEN 500 MG
1000 TABLET ORAL EVERY 6 HOURS PRN
Qty: 60 TABLET | Refills: 0 | Status: SHIPPED | OUTPATIENT
Start: 2023-03-29

## 2023-03-29 RX ORDER — FAMOTIDINE 20 MG/1
20 TABLET, FILM COATED ORAL ONCE
Status: COMPLETED | OUTPATIENT
Start: 2023-03-29 | End: 2023-03-29

## 2023-03-29 RX ORDER — IPRATROPIUM BROMIDE AND ALBUTEROL SULFATE 2.5; .5 MG/3ML; MG/3ML
2 SOLUTION RESPIRATORY (INHALATION) ONCE
Status: COMPLETED | OUTPATIENT
Start: 2023-03-29 | End: 2023-03-29

## 2023-03-29 RX ADMIN — ONDANSETRON 4 MG: 4 TABLET, ORALLY DISINTEGRATING ORAL at 19:41

## 2023-03-29 RX ADMIN — FAMOTIDINE 20 MG: 20 TABLET, FILM COATED ORAL at 19:41

## 2023-03-29 RX ADMIN — ACETAMINOPHEN 1000 MG: 500 TABLET ORAL at 16:33

## 2023-03-29 RX ADMIN — GUAIFENESIN SYRUP AND DEXTROMETHORPHAN 5 ML: 100; 10 SYRUP ORAL at 16:33

## 2023-03-29 RX ADMIN — IPRATROPIUM BROMIDE AND ALBUTEROL SULFATE 2 AMPULE: 2.5; .5 SOLUTION RESPIRATORY (INHALATION) at 19:32

## 2023-03-29 RX ADMIN — Medication: at 16:33

## 2023-03-29 RX ADMIN — PREDNISONE 60 MG: 20 TABLET ORAL at 19:41

## 2023-03-29 ASSESSMENT — PAIN DESCRIPTION - FREQUENCY: FREQUENCY: CONTINUOUS

## 2023-03-29 ASSESSMENT — PAIN DESCRIPTION - ONSET: ONSET: ON-GOING

## 2023-03-29 ASSESSMENT — PAIN DESCRIPTION - LOCATION: LOCATION: THROAT;RIB CAGE

## 2023-03-29 ASSESSMENT — PAIN - FUNCTIONAL ASSESSMENT: PAIN_FUNCTIONAL_ASSESSMENT: 0-10

## 2023-03-29 ASSESSMENT — PAIN SCALES - GENERAL: PAINLEVEL_OUTOF10: 8

## 2023-03-29 ASSESSMENT — PAIN DESCRIPTION - DESCRIPTORS: DESCRIPTORS: ACHING;SHARP

## 2023-03-29 NOTE — ED PROVIDER NOTES
TempSrc: Temporal   SpO2: 95%   Weight: (!) 386 lb (175.1 kg)   Height: 5' 7\" (1.702 m)       PROCEDURES:  Unless otherwise noted below, none  Procedures    LABS:  Labs Reviewed - No data to display    No orders to display            32 y.o. male ***  Upon initial evaluation, Pt {.nalmdm1:26124}, but otherwise*** {.NALMDM2:56222::\"Afebrile, HDS and in NAD\"}. PE as noted above. {NALMDM2:29982::\"Labs, EKG and Imaging\"} visualized and interpreted by myself and as noted above. Given findings, clinical presentation most likely consistent w/ ***. Pt was administered Medications - No data to display    Plan: {NALMDM3:53361::\"Patient understanding and amenable to the POC. \"}    CRITICAL CARE TIME   Total CriticalCare time was *** minutes, excluding separately reportable procedures. There was a high probability of clinically significant/life threatening deterioration in the patient's condition which required my urgent intervention. FINAL IMPRESSION    No diagnosis found.       DISPOSITION/PLAN   DISPOSITION        Current Discharge Medication List           Fanny Hansen MD

## 2023-03-29 NOTE — ED NOTES
After 3 US attempts, unable to get IV on patient. Lab at bedside to collect blood work. Dr. Ledbetter [facility] aware.       Prisca Chavez, MAC  03/29/23 9500

## 2023-03-29 NOTE — ED NOTES
Pt states cough and rib pain since last night. Pt states very painful cough.      Jenni Mcfarlane  03/29/23 3236

## 2023-03-30 NOTE — ED NOTES
Patient resting in bed with call light in reach. Breathes are even and unlabored. Skin is warm and dry. Vital signs are stable. Patient remains on bedside monitor. Patient denies any needs at this time.       Prisca Chavez RN  03/29/23 2021

## 2023-03-30 NOTE — ED NOTES
Patient given discharge instructions and prescriptions and verbalized understanding. Vital signs stable. Resp even and unlabored. Skin warm, dry and intact. Patient is alert and oriented. IV removed. Patient doesn't have any questions at this time.       Karen Mendosa RN  03/29/23 2021

## 2023-03-30 NOTE — ED NOTES
Patient resting in bed, A & O x4, skin warm, dry and pink, 0 distress, resp even and unlabored. Call light in reach. Vital signs stable. Patient remains on bedside monitor. Patient denies any needs at this time.       Clara Donald RN  03/29/23 2021

## 2023-04-02 LAB
EKG ATRIAL RATE: 95 BPM
EKG P AXIS: 22 DEGREES
EKG P-R INTERVAL: 158 MS
EKG Q-T INTERVAL: 362 MS
EKG QRS DURATION: 84 MS
EKG QTC CALCULATION (BAZETT): 454 MS
EKG R AXIS: 68 DEGREES
EKG T AXIS: 24 DEGREES
EKG VENTRICULAR RATE: 95 BPM

## 2023-04-21 ASSESSMENT — ENCOUNTER SYMPTOMS
EYE PAIN: 0
SHORTNESS OF BREATH: 1
SORE THROAT: 1
VOMITING: 0
RHINORRHEA: 1
ABDOMINAL PAIN: 0
DIARRHEA: 0
COUGH: 1
BACK PAIN: 0
NAUSEA: 0
CHEST TIGHTNESS: 1

## 2023-06-01 ENCOUNTER — OFFICE VISIT (OUTPATIENT)
Dept: BARIATRICS/WEIGHT MGMT | Age: 28
End: 2023-06-01
Payer: MEDICARE

## 2023-06-01 VITALS
WEIGHT: 315 LBS | HEIGHT: 65 IN | BODY MASS INDEX: 52.48 KG/M2 | SYSTOLIC BLOOD PRESSURE: 120 MMHG | OXYGEN SATURATION: 99 % | DIASTOLIC BLOOD PRESSURE: 76 MMHG | HEART RATE: 93 BPM

## 2023-06-01 DIAGNOSIS — F31.4 BIPOLAR 1 DISORDER, DEPRESSED, SEVERE (HCC): ICD-10-CM

## 2023-06-01 DIAGNOSIS — F79 INTELLECTUAL DISABILITY: ICD-10-CM

## 2023-06-01 DIAGNOSIS — F90.1 ATTENTION DEFICIT HYPERACTIVITY DISORDER (ADHD), PREDOMINANTLY HYPERACTIVE TYPE: ICD-10-CM

## 2023-06-01 DIAGNOSIS — E66.9 DIABETES MELLITUS TYPE 2 IN OBESE (HCC): ICD-10-CM

## 2023-06-01 DIAGNOSIS — E11.69 DIABETES MELLITUS TYPE 2 IN OBESE (HCC): ICD-10-CM

## 2023-06-01 DIAGNOSIS — F84.0 AUTISTIC DISORDER: ICD-10-CM

## 2023-06-01 DIAGNOSIS — F41.0 PANIC DISORDER (EPISODIC PAROXYSMAL ANXIETY): ICD-10-CM

## 2023-06-01 DIAGNOSIS — E66.01 MORBID OBESITY DUE TO EXCESS CALORIES (HCC): Primary | ICD-10-CM

## 2023-06-01 LAB — HBA1C MFR BLD: 8.7 % (ref 4.8–5.9)

## 2023-06-01 PROCEDURE — G8427 DOCREV CUR MEDS BY ELIG CLIN: HCPCS | Performed by: SURGERY

## 2023-06-01 PROCEDURE — 2022F DILAT RTA XM EVC RTNOPTHY: CPT | Performed by: SURGERY

## 2023-06-01 PROCEDURE — G8417 CALC BMI ABV UP PARAM F/U: HCPCS | Performed by: SURGERY

## 2023-06-01 PROCEDURE — 1036F TOBACCO NON-USER: CPT | Performed by: SURGERY

## 2023-06-01 PROCEDURE — 99205 OFFICE O/P NEW HI 60 MIN: CPT | Performed by: SURGERY

## 2023-06-01 PROCEDURE — 3052F HG A1C>EQUAL 8.0%<EQUAL 9.0%: CPT | Performed by: SURGERY

## 2023-06-01 RX ORDER — LORAZEPAM 0.5 MG/1
0.5 TABLET ORAL DAILY PRN
COMMUNITY
Start: 2023-05-01

## 2023-06-01 RX ORDER — OXCARBAZEPINE 150 MG/1
150 TABLET, FILM COATED ORAL 2 TIMES DAILY
COMMUNITY
Start: 2023-03-26

## 2023-06-01 NOTE — PROGRESS NOTES
Surgery Consultation    Patient Name:  :  Hospital Day:   Artie Eller 1995 [unfilled]     Date of Service: 2023    Subjective:      History of Present Illness:    Artie Eller  is a 32 y.o. male referred by Dr. Joel Lenz for morbid obesity. Artie Eller has the additional diagnoses of mental retardation, ADHD, autistic disorder, and bipolar disorder. He has not attempted weight loss prior to last month. Gurpreet Oneill is accompanied by his mother who is concerned that Gurpreet Oneill might binge eat post operatively and injure himself. Per his mother, Gurpreet Oneill binge eats every night and does not comply with her demands to stop. Past Medical History:   Diagnosis Date    ADHD (attention deficit hyperactivity disorder) 2012    Allergic rhinitis 2012    Autism     Hypertension     Obesity (BMI 30-39.9) 2012    Sleep apnea     Past Surgical History:   Procedure Laterality Date    TONSILLECTOMY          Family History   Problem Relation Age of Onset    No Known Problems Mother     No Known Problems Father     Social History     Tobacco Use    Smoking status: Former     Packs/day: 0.50     Years: 7.00     Pack years: 3.50     Types: Cigarettes     Start date: 2013     Quit date: 2018     Years since quittin.0    Smokeless tobacco: Never   Vaping Use    Vaping Use: Never used   Substance Use Topics    Alcohol use: Not Currently     Comment: occ    Drug use: No        Allergies: Patient has no known allergies. Review of Systems  Review of Systems - History obtained from the patient  General ROS: negative for - fever, malaise, or weight loss  ENT ROS: negative for - headaches, nasal congestion, or sore throat  Hematological and Lymphatic ROS: No bruising or easy bleeding.   Respiratory ROS: no cough, shortness of breath, or wheezing  Cardiovascular ROS: no chest pain or dyspnea on exertion  Gastrointestinal ROS: Negative for abdominal pain, distention, hematochezia, melena, nausea,

## 2023-06-01 NOTE — PATIENT INSTRUCTIONS
Follow up with Yadira Santana CNP and Truong Gibson MD for medical weight loss management. Follow up with Dr. Jean Diaz for psychological evaluation.

## 2023-07-05 ENCOUNTER — HOSPITAL ENCOUNTER (EMERGENCY)
Age: 28
Discharge: HOME | End: 2023-07-05
Payer: MEDICARE

## 2023-07-05 VITALS
RESPIRATION RATE: 24 BRPM | HEART RATE: 98 BPM | HEIGHT: 65 IN | WEIGHT: 310 LBS | TEMPERATURE: 98.9 F | BODY MASS INDEX: 51.65 KG/M2 | OXYGEN SATURATION: 97 % | SYSTOLIC BLOOD PRESSURE: 175 MMHG | DIASTOLIC BLOOD PRESSURE: 85 MMHG

## 2023-07-05 DIAGNOSIS — H61.23 BILATERAL IMPACTED CERUMEN: Primary | ICD-10-CM

## 2023-07-05 PROCEDURE — 99283 EMERGENCY DEPT VISIT LOW MDM: CPT

## 2023-07-05 ASSESSMENT — ENCOUNTER SYMPTOMS
BACK PAIN: 0
ABDOMINAL PAIN: 0
SHORTNESS OF BREATH: 0
COUGH: 0

## 2023-07-05 ASSESSMENT — PAIN - FUNCTIONAL ASSESSMENT: PAIN_FUNCTIONAL_ASSESSMENT: NONE - DENIES PAIN

## 2023-07-05 NOTE — ED PROVIDER NOTES
[facility] ED  eMERGENCY dEPARTMENT eNCOUnter      Pt Name: Cris Langston  MRN: [id number]  [address] [facility] [place] 1995  Date of evaluation: 7/5/2023  Provider: COLT Hoover CNP      HISTORY OF PRESENT ILLNESS    Cris Langston is a 32 y.o. male who presents to the Emergency Department with bilateral ear fullness for a few days. Patient states he has cerumen impaction bilateral.  Denies fever, chills, SOB. REVIEW OF SYSTEMS       Review of Systems   Constitutional:  Negative for fever. HENT:  Negative for congestion. Ears feeling full. Respiratory:  Negative for cough and shortness of breath. Cardiovascular:  Negative for chest pain. Gastrointestinal:  Negative for abdominal pain. Genitourinary:  Negative for dysuria. Musculoskeletal:  Negative for arthralgias and back pain. Skin:  Negative for rash. All other systems reviewed and are negative. PAST MEDICAL HISTORY     Past Medical History:   Diagnosis Date    ADHD (attention deficit hyperactivity disorder) 04/04/2012    Allergic rhinitis 04/04/2012    Autism     Hypertension     Obesity (BMI 30-39.9) 04/04/2012    Sleep apnea          SURGICAL HISTORY       Past Surgical History:   Procedure Laterality Date    TONSILLECTOMY           CURRENT MEDICATIONS       Previous Medications    ACETAMINOPHEN (TYLENOL) 500 MG TABLET    Take 2 tablets by mouth every 6 hours as needed for Pain or Fever    ALBUTEROL-IPRATROPIUM (COMBIVENT RESPIMAT)  MCG/ACT AERS INHALER    Inhale 1 puff into the lungs in the morning and 1 puff at noon and 1 puff in the evening and 1 puff before bedtime.     ALLOPURINOL (ZYLOPRIM) 100 MG TABLET    Take 1 tablet by mouth daily    AMLODIPINE (NORVASC) 10 MG TABLET    TAKE 1 TABLET BY MOUTH EVERY DAY    BLOOD PRESSURE MONITORING (ADULT BLOOD PRESSURE CUFF LG) KIT    Check blood pressure every other day    CETIRIZINE (ZYRTEC) 10 MG TABLET    Take 1 tablet by mouth daily    COLCHICINE (COLCRYS) 0.6

## 2023-08-01 ENCOUNTER — APPOINTMENT (OUTPATIENT)
Dept: GENERAL RADIOLOGY | Age: 28
End: 2023-08-01
Payer: MEDICARE

## 2023-08-01 ENCOUNTER — HOSPITAL ENCOUNTER (EMERGENCY)
Age: 28
Discharge: HOME | End: 2023-08-01
Attending: EMERGENCY MEDICINE
Payer: MEDICARE

## 2023-08-01 VITALS
HEIGHT: 67 IN | DIASTOLIC BLOOD PRESSURE: 92 MMHG | BODY MASS INDEX: 49.44 KG/M2 | SYSTOLIC BLOOD PRESSURE: 144 MMHG | WEIGHT: 315 LBS | HEART RATE: 81 BPM | OXYGEN SATURATION: 96 % | TEMPERATURE: 98.8 F | RESPIRATION RATE: 18 BRPM

## 2023-08-01 DIAGNOSIS — J06.9 VIRAL URI: Primary | ICD-10-CM

## 2023-08-01 LAB
B PARAP IS1001 DNA NPH QL NAA+NON-PROBE: NOT DETECTED
B PERT.PT PRMT NPH QL NAA+NON-PROBE: NOT DETECTED
C PNEUM DNA NPH QL NAA+NON-PROBE: NOT DETECTED
FLUAV RNA NPH QL NAA+NON-PROBE: NOT DETECTED
FLUBV RNA NPH QL NAA+NON-PROBE: NOT DETECTED
HADV DNA NPH QL NAA+NON-PROBE: NOT DETECTED
HCOV 229E RNA NPH QL NAA+NON-PROBE: NOT DETECTED
HCOV HKU1 RNA NPH QL NAA+NON-PROBE: NOT DETECTED
HCOV NL63 RNA NPH QL NAA+NON-PROBE: NOT DETECTED
HCOV OC43 RNA NPH QL NAA+NON-PROBE: NOT DETECTED
HMPV RNA NPH QL NAA+NON-PROBE: NOT DETECTED
HPIV1 RNA NPH QL NAA+NON-PROBE: NOT DETECTED
HPIV2 RNA NPH QL NAA+NON-PROBE: NOT DETECTED
HPIV3 RNA NPH QL NAA+NON-PROBE: NOT DETECTED
HPIV4 RNA NPH QL NAA+NON-PROBE: NOT DETECTED
M PNEUMO DNA NPH QL NAA+NON-PROBE: NOT DETECTED
RSV RNA NPH QL NAA+NON-PROBE: NOT DETECTED
RV+EV RNA NPH QL NAA+NON-PROBE: DETECTED
SARS-COV-2 RNA NPH QL NAA+NON-PROBE: NOT DETECTED

## 2023-08-01 PROCEDURE — 6360000002 HC RX W HCPCS: Performed by: EMERGENCY MEDICINE

## 2023-08-01 PROCEDURE — 71046 X-RAY EXAM CHEST 2 VIEWS: CPT

## 2023-08-01 PROCEDURE — 6370000000 HC RX 637 (ALT 250 FOR IP): Performed by: EMERGENCY MEDICINE

## 2023-08-01 PROCEDURE — 96372 THER/PROPH/DIAG INJ SC/IM: CPT

## 2023-08-01 PROCEDURE — 99284 EMERGENCY DEPT VISIT MOD MDM: CPT

## 2023-08-01 PROCEDURE — 0202U NFCT DS 22 TRGT SARS-COV-2: CPT

## 2023-08-01 RX ORDER — METOCLOPRAMIDE HYDROCHLORIDE 5 MG/ML
10 INJECTION INTRAMUSCULAR; INTRAVENOUS ONCE
Status: COMPLETED | OUTPATIENT
Start: 2023-08-01 | End: 2023-08-01

## 2023-08-01 RX ORDER — BENZONATATE 200 MG/1
200 CAPSULE ORAL 3 TIMES DAILY PRN
Qty: 30 CAPSULE | Refills: 0 | Status: SHIPPED | OUTPATIENT
Start: 2023-08-01 | End: 2023-08-08

## 2023-08-01 RX ORDER — KETOROLAC TROMETHAMINE 30 MG/ML
30 INJECTION, SOLUTION INTRAMUSCULAR; INTRAVENOUS ONCE
Status: COMPLETED | OUTPATIENT
Start: 2023-08-01 | End: 2023-08-01

## 2023-08-01 RX ORDER — ALBUTEROL SULFATE 90 UG/1
4 AEROSOL, METERED RESPIRATORY (INHALATION) ONCE
Status: DISCONTINUED | OUTPATIENT
Start: 2023-08-01 | End: 2023-08-01 | Stop reason: HOSPADM

## 2023-08-01 RX ORDER — BENZONATATE 100 MG/1
200 CAPSULE ORAL ONCE
Status: COMPLETED | OUTPATIENT
Start: 2023-08-01 | End: 2023-08-01

## 2023-08-01 RX ORDER — ONDANSETRON 4 MG/1
4 TABLET, ORALLY DISINTEGRATING ORAL 3 TIMES DAILY PRN
Qty: 21 TABLET | Refills: 0 | Status: SHIPPED | OUTPATIENT
Start: 2023-08-01

## 2023-08-01 RX ORDER — PREDNISONE 50 MG/1
50 TABLET ORAL DAILY
Qty: 5 TABLET | Refills: 0 | Status: SHIPPED | OUTPATIENT
Start: 2023-08-01 | End: 2023-08-06

## 2023-08-01 RX ORDER — DICYCLOMINE HYDROCHLORIDE 10 MG/ML
20 INJECTION INTRAMUSCULAR ONCE
Status: COMPLETED | OUTPATIENT
Start: 2023-08-01 | End: 2023-08-01

## 2023-08-01 RX ADMIN — KETOROLAC TROMETHAMINE 30 MG: 30 INJECTION, SOLUTION INTRAMUSCULAR; INTRAVENOUS at 01:54

## 2023-08-01 RX ADMIN — DICYCLOMINE HYDROCHLORIDE 20 MG: 10 INJECTION, SOLUTION INTRAMUSCULAR at 01:53

## 2023-08-01 RX ADMIN — BENZONATATE 200 MG: 100 CAPSULE ORAL at 01:54

## 2023-08-01 RX ADMIN — PREDNISONE 50 MG: 20 TABLET ORAL at 01:54

## 2023-08-01 RX ADMIN — METOCLOPRAMIDE HYDROCHLORIDE 10 MG: 5 INJECTION INTRAMUSCULAR; INTRAVENOUS at 01:53

## 2023-08-01 ASSESSMENT — ENCOUNTER SYMPTOMS
SHORTNESS OF BREATH: 0
DIARRHEA: 1
RHINORRHEA: 1
NAUSEA: 1
ABDOMINAL PAIN: 0
SORE THROAT: 1
COUGH: 1

## 2023-08-01 ASSESSMENT — PAIN SCALES - GENERAL: PAINLEVEL_OUTOF10: 8

## 2023-08-01 ASSESSMENT — PAIN - FUNCTIONAL ASSESSMENT: PAIN_FUNCTIONAL_ASSESSMENT: 0-10

## 2023-08-01 ASSESSMENT — PAIN DESCRIPTION - LOCATION: LOCATION: THROAT

## 2023-08-01 NOTE — ED NOTES
Patient here via EMS for symptoms of general illness beginning on Sunday. Patient reports congestion, cough with yellow mucus, diarrhea, nausea, and vomiting. Patient afebrile. VSS.  Electronically signed by Queenie Levine RN on 8/1/2023 at 1:20 AM       Queenie Levine RN  08/01/23 0121

## 2023-08-01 NOTE — ED PROVIDER NOTES
[facility] ED  EMERGENCY DEPARTMENT ENCOUNTER      Pt Name: Drew Gamboa  MRN: [id number]  [address] [facility] 1995  Date of evaluation: 8/1/2023  Provider: Jessica Thompson MD    CHIEF COMPLAINT     No chief complaint on file. HISTORY OF PRESENT ILLNESS   (Location/Symptom, Timing/Onset, Context/Setting, Quality, Duration, Modifying Factors, Severity)  Note limiting factors. Drew Gamboa is a 32 y.o. male who presents to the emergency department via EMS for evaluation of flu like symptoms. He states for 3 days he has had congestion, rhinorrhea, productive cough, nausea, diarrhea, body aches. No severe headache or vision change, neck or jaw pain, chest pain or current shortness of breath, hematemesis, numbness or focal weakness. He took Robitussin today without significant relief. HPI  Chart review shows history of autism, ADHD, hypertension, obesity, obstructive sleep apnea  Nursing Notes were reviewed. REVIEW OF SYSTEMS    (2-9 systems for level 4, 10 or more for level 5)     Review of Systems   Constitutional:  Positive for fatigue. HENT:  Positive for congestion, rhinorrhea and sore throat (burning). Respiratory:  Positive for cough. Negative for shortness of breath. Cardiovascular:  Negative for chest pain. Gastrointestinal:  Positive for diarrhea and nausea. Negative for abdominal pain. Genitourinary:  Negative for flank pain. Musculoskeletal:  Negative for neck stiffness. Skin:  Negative for rash. Neurological:  Negative for syncope and headaches. Psychiatric/Behavioral:  Negative for confusion. All other systems reviewed and are negative. Except as noted above the remainder of the review of systems was reviewed and negative.        PAST MEDICAL HISTORY     Past Medical History:   Diagnosis Date    ADHD (attention deficit hyperactivity disorder) 04/04/2012    Allergic rhinitis 04/04/2012    Autism     Hypertension     Obesity (BMI 30-39.9) 04/04/2012    Sleep

## 2023-08-03 RX ORDER — VALBENAZINE 80 MG/1
CAPSULE ORAL
Qty: 30 CAPSULE | Refills: 5 | Status: ACTIVE | OUTPATIENT
Start: 2023-08-03

## 2023-08-03 NOTE — TELEPHONE ENCOUNTER
requesting medication refill.  Please approve or deny this request.    Rx requested:  Requested Prescriptions     Pending Prescriptions Disp Refills    Valbenazine Tosylate (INGREZZA) 80 MG CAPS 30 capsule 5     Sig: TAKE 1 CAPSULE BY MOUTH DAILY         Last Office Visit:   2/17/2023      Next Visit Date:  Future Appointments   Date Time Provider [address]   8/7/2023  4:15 PM [facility]   11/3/2023  9:45 AM Melquiades Lee MD [facility] Neurology -

## 2023-08-07 ENCOUNTER — OFFICE VISIT (OUTPATIENT)
Dept: FAMILY MEDICINE CLINIC | Age: 28
End: 2023-08-07
Payer: MEDICARE

## 2023-08-07 VITALS
DIASTOLIC BLOOD PRESSURE: 72 MMHG | SYSTOLIC BLOOD PRESSURE: 142 MMHG | BODY MASS INDEX: 59.74 KG/M2 | HEART RATE: 93 BPM | WEIGHT: 315 LBS | TEMPERATURE: 98.3 F | OXYGEN SATURATION: 97 %

## 2023-08-07 DIAGNOSIS — F79 INTELLECTUAL DISABILITY: ICD-10-CM

## 2023-08-07 DIAGNOSIS — F31.4 BIPOLAR 1 DISORDER, DEPRESSED, SEVERE (HCC): ICD-10-CM

## 2023-08-07 DIAGNOSIS — F84.0 AUTISTIC DISORDER: ICD-10-CM

## 2023-08-07 DIAGNOSIS — I51.9 HEART DISEASE: ICD-10-CM

## 2023-08-07 DIAGNOSIS — F90.1 ATTENTION DEFICIT HYPERACTIVITY DISORDER (ADHD), PREDOMINANTLY HYPERACTIVE TYPE: ICD-10-CM

## 2023-08-07 DIAGNOSIS — G47.33 OBSTRUCTIVE SLEEP APNEA SYNDROME: ICD-10-CM

## 2023-08-07 DIAGNOSIS — E11.9 TYPE 2 DIABETES MELLITUS WITHOUT COMPLICATION, WITHOUT LONG-TERM CURRENT USE OF INSULIN (HCC): Primary | ICD-10-CM

## 2023-08-07 PROCEDURE — 1036F TOBACCO NON-USER: CPT | Performed by: STUDENT IN AN ORGANIZED HEALTH CARE EDUCATION/TRAINING PROGRAM

## 2023-08-07 PROCEDURE — 3052F HG A1C>EQUAL 8.0%<EQUAL 9.0%: CPT | Performed by: STUDENT IN AN ORGANIZED HEALTH CARE EDUCATION/TRAINING PROGRAM

## 2023-08-07 PROCEDURE — G8417 CALC BMI ABV UP PARAM F/U: HCPCS | Performed by: STUDENT IN AN ORGANIZED HEALTH CARE EDUCATION/TRAINING PROGRAM

## 2023-08-07 PROCEDURE — G8427 DOCREV CUR MEDS BY ELIG CLIN: HCPCS | Performed by: STUDENT IN AN ORGANIZED HEALTH CARE EDUCATION/TRAINING PROGRAM

## 2023-08-07 PROCEDURE — 2022F DILAT RTA XM EVC RTNOPTHY: CPT | Performed by: STUDENT IN AN ORGANIZED HEALTH CARE EDUCATION/TRAINING PROGRAM

## 2023-08-07 PROCEDURE — 99215 OFFICE O/P EST HI 40 MIN: CPT | Performed by: STUDENT IN AN ORGANIZED HEALTH CARE EDUCATION/TRAINING PROGRAM

## 2023-08-07 RX ORDER — DULAGLUTIDE 0.75 MG/.5ML
0.75 INJECTION, SOLUTION SUBCUTANEOUS WEEKLY
Qty: 4 ADJUSTABLE DOSE PRE-FILLED PEN SYRINGE | Refills: 0 | Status: SHIPPED | OUTPATIENT
Start: 2023-08-07 | End: 2023-09-04

## 2023-08-07 SDOH — ECONOMIC STABILITY: FOOD INSECURITY: WITHIN THE PAST 12 MONTHS, YOU WORRIED THAT YOUR FOOD WOULD RUN OUT BEFORE YOU GOT MONEY TO BUY MORE.: NEVER TRUE

## 2023-08-07 SDOH — ECONOMIC STABILITY: FOOD INSECURITY: WITHIN THE PAST 12 MONTHS, THE FOOD YOU BOUGHT JUST DIDN'T LAST AND YOU DIDN'T HAVE MONEY TO GET MORE.: NEVER TRUE

## 2023-08-07 SDOH — ECONOMIC STABILITY: HOUSING INSECURITY
IN THE LAST 12 MONTHS, WAS THERE A TIME WHEN YOU DID NOT HAVE A STEADY PLACE TO SLEEP OR SLEPT IN A SHELTER (INCLUDING NOW)?: NO

## 2023-08-07 SDOH — ECONOMIC STABILITY: INCOME INSECURITY: HOW HARD IS IT FOR YOU TO PAY FOR THE VERY BASICS LIKE FOOD, HOUSING, MEDICAL CARE, AND HEATING?: NOT HARD AT ALL

## 2023-08-07 ASSESSMENT — ENCOUNTER SYMPTOMS
VOMITING: 0
COUGH: 0
SHORTNESS OF BREATH: 0
SORE THROAT: 0
ABDOMINAL PAIN: 0
SINUS PRESSURE: 0

## 2023-08-07 ASSESSMENT — PATIENT HEALTH QUESTIONNAIRE - PHQ9
6. FEELING BAD ABOUT YOURSELF - OR THAT YOU ARE A FAILURE OR HAVE LET YOURSELF OR YOUR FAMILY DOWN: 0
3. TROUBLE FALLING OR STAYING ASLEEP: 0
4. FEELING TIRED OR HAVING LITTLE ENERGY: 0
5. POOR APPETITE OR OVEREATING: 0
7. TROUBLE CONCENTRATING ON THINGS, SUCH AS READING THE NEWSPAPER OR WATCHING TELEVISION: 0
SUM OF ALL RESPONSES TO PHQ QUESTIONS 1-9: 0
SUM OF ALL RESPONSES TO PHQ QUESTIONS 1-9: 0
10. IF YOU CHECKED OFF ANY PROBLEMS, HOW DIFFICULT HAVE THESE PROBLEMS MADE IT FOR YOU TO DO YOUR WORK, TAKE CARE OF THINGS AT HOME, OR GET ALONG WITH OTHER PEOPLE: 0
1. LITTLE INTEREST OR PLEASURE IN DOING THINGS: 0
9. THOUGHTS THAT YOU WOULD BE BETTER OFF DEAD, OR OF HURTING YOURSELF: 0
SUM OF ALL RESPONSES TO PHQ QUESTIONS 1-9: 0
2. FEELING DOWN, DEPRESSED OR HOPELESS: 0
SUM OF ALL RESPONSES TO PHQ QUESTIONS 1-9: 0
SUM OF ALL RESPONSES TO PHQ9 QUESTIONS 1 & 2: 0
8. MOVING OR SPEAKING SO SLOWLY THAT OTHER PEOPLE COULD HAVE NOTICED. OR THE OPPOSITE, BEING SO FIGETY OR RESTLESS THAT YOU HAVE BEEN MOVING AROUND A LOT MORE THAN USUAL: 0

## 2023-08-07 NOTE — PROGRESS NOTES
strategies to overcome habits/challenges for focus    Medications:    Orders Placed This Encounter   Medications    Dulaglutide (TRULICITY) 2.13 QO/0.3OL SOPN     Sig: Inject 0.75 mg into the skin once a week for 28 days     Dispense:  4 Adjustable Dose Pre-filled Pen Syringe     Refill:  0       Reviewed:  [x]  Patient meets criteria of BMI >30 or > 27 with obesity related co-morbidities  [x]  24-hour food log x 3 (2 weekday logs and 1 weekend log)  []  Labs ordered:       []  A1C     []  FBS     []  Lipids     []  TSH/TFT     []  CMP     []  LFT  []  Hidden CHO/carbohydrate sources  []  Alcohol as possible source of hidden/amnesia calories and its effects  [x]  Importance of physical activity and reducing sedentary time  [x]  Treatment plan   [x]  Side effects, adverse effects of the medication prescribed today  [x]  Handouts reviewed and given to patient:   [x] Plan for success  [x] Healthy Habits     [x] Food logging  [x] Medication options  [] Healthy eating plan [] Mindful eating  [] Starting to exercise [] Weight maintenance     Comments:   Nutrition  - Discussed pathophysiology of weight loss/gain, hormone regulation involving ghrelin and leptin, set point theory  - Discussed that from a medical standpoint, 5 to 10% weight loss has the most impact on obesity related conditions including improvement in blood pressure, cholesterol, blood sugars, etc.  - Recommended daily intake: 60-80 grams of protein spread throughout the day, 30-35 grams of fiber, <30 grams of added sugar, 64oz water, elimination of sugar sweetened beverages, limiting alcohol intake and eating out  - Briefly reviewed how to read a nutrition label focusing on serving size, calories, fiber, protein and added sugars  - Recommended food logging to learn about food and for accountability  - Discussed controlling the environment - not having \"junk food\" like cookies, chips, ice cream, etc. in the pantry/house  - Discussed myplate, increasing

## 2023-08-21 ENCOUNTER — OFFICE VISIT (OUTPATIENT)
Dept: FAMILY MEDICINE CLINIC | Age: 28
End: 2023-08-21
Payer: MEDICARE

## 2023-08-21 VITALS
WEIGHT: 315 LBS | HEART RATE: 94 BPM | BODY MASS INDEX: 52.48 KG/M2 | OXYGEN SATURATION: 97 % | SYSTOLIC BLOOD PRESSURE: 140 MMHG | HEIGHT: 65 IN | DIASTOLIC BLOOD PRESSURE: 80 MMHG

## 2023-08-21 DIAGNOSIS — F90.1 ATTENTION DEFICIT HYPERACTIVITY DISORDER (ADHD), PREDOMINANTLY HYPERACTIVE TYPE: ICD-10-CM

## 2023-08-21 DIAGNOSIS — F31.4 BIPOLAR 1 DISORDER, DEPRESSED, SEVERE (HCC): ICD-10-CM

## 2023-08-21 DIAGNOSIS — I51.9 HEART DISEASE: ICD-10-CM

## 2023-08-21 DIAGNOSIS — G47.33 OBSTRUCTIVE SLEEP APNEA SYNDROME: ICD-10-CM

## 2023-08-21 DIAGNOSIS — E11.9 TYPE 2 DIABETES MELLITUS WITHOUT COMPLICATION, WITHOUT LONG-TERM CURRENT USE OF INSULIN (HCC): Primary | ICD-10-CM

## 2023-08-21 PROCEDURE — G8427 DOCREV CUR MEDS BY ELIG CLIN: HCPCS | Performed by: STUDENT IN AN ORGANIZED HEALTH CARE EDUCATION/TRAINING PROGRAM

## 2023-08-21 PROCEDURE — G8417 CALC BMI ABV UP PARAM F/U: HCPCS | Performed by: STUDENT IN AN ORGANIZED HEALTH CARE EDUCATION/TRAINING PROGRAM

## 2023-08-21 PROCEDURE — 1036F TOBACCO NON-USER: CPT | Performed by: STUDENT IN AN ORGANIZED HEALTH CARE EDUCATION/TRAINING PROGRAM

## 2023-08-21 PROCEDURE — 3052F HG A1C>EQUAL 8.0%<EQUAL 9.0%: CPT | Performed by: STUDENT IN AN ORGANIZED HEALTH CARE EDUCATION/TRAINING PROGRAM

## 2023-08-21 PROCEDURE — 99214 OFFICE O/P EST MOD 30 MIN: CPT | Performed by: STUDENT IN AN ORGANIZED HEALTH CARE EDUCATION/TRAINING PROGRAM

## 2023-08-21 PROCEDURE — 2022F DILAT RTA XM EVC RTNOPTHY: CPT | Performed by: STUDENT IN AN ORGANIZED HEALTH CARE EDUCATION/TRAINING PROGRAM

## 2023-08-21 RX ORDER — UBIQUINOL 100 MG
CAPSULE ORAL
COMMUNITY
Start: 2023-08-01

## 2023-08-21 RX ORDER — LANCETS 28 GAUGE
EACH MISCELLANEOUS
COMMUNITY
Start: 2023-07-13

## 2023-08-21 RX ORDER — DULAGLUTIDE 1.5 MG/.5ML
1.5 INJECTION, SOLUTION SUBCUTANEOUS WEEKLY
Qty: 2 ML | Refills: 0 | Status: SHIPPED | OUTPATIENT
Start: 2023-08-21 | End: 2023-09-18

## 2023-08-21 RX ORDER — ONDANSETRON 4 MG/1
TABLET, FILM COATED ORAL
COMMUNITY
Start: 2023-08-18

## 2023-08-21 ASSESSMENT — ENCOUNTER SYMPTOMS
SHORTNESS OF BREATH: 0
ABDOMINAL PAIN: 0
SINUS PRESSURE: 0
COUGH: 0
VOMITING: 0
SORE THROAT: 0

## 2023-08-21 NOTE — PROGRESS NOTES
Recommended food logging to learn about food and for accountability  - Discussed controlling the environment - not having \"junk food\" like cookies, chips, ice cream, etc. in the pantry/house  - Discussed myplate, increasing vegetables/fruits, alternative healthy snacking options  - Discussed importance of sleep and stress management for weight loss/maintenance  - Reviewed setting SMART goals  - Discussed mindfulness while eating/snacking, hunger/fullness scale, food as fuel, \"finishing your plate\"    Physical Activity  - Discussed NEAT  - Recommended 150 min per week of moderate-intensity physical activity for weight loss and 300 minutes per week of moderate-intensity PA for weight maintenance  - Discussed slowly increasing physical activity as tolerated with increases in frequency, duration and intensity  - Reviewed importance of 2-3 days per week of weight training for muscle maintenance with weight loss    Weight loss medications  - Discussed options including , , , Plenity, GLP-1s, metformin, orlistat, ,  and - Contraindications: Adipex/Qsymia (hx CAD), Topamax/Contrave/Wellbutrin (already on mood meds, hx bipolar)     8/7  -Start GLP-1, no personal history of pancreatitis and no family history of thyroid cancer, Trulicity sent to pt pharmacy    8/21  - Tolerating Trulicity well, mild nausea, did have two defective pens, last dose 8/17  - Will increase to trulicity 4.0WU weekly, pt's mom will call [employer] and ask for replacement pens      Type 2 diabetes   A1c 8.7% 6/1    RTO: Return in about 4 weeks (around 9/18/2023) for Weight management 15 min. Reviewed with the patient: current clinical status, medications, activities and diet. Close follow up to evaluate treatment results and for coordination of care. Time was given for questions. All questions were answered to the patients satisfaction.    7 minutes spent on reviewing previous notes, records and labs; 15 minutes spent on physical exam, obesity,

## 2023-09-18 ENCOUNTER — OFFICE VISIT (OUTPATIENT)
Dept: FAMILY MEDICINE CLINIC | Age: 28
End: 2023-09-18
Payer: MEDICARE

## 2023-09-18 VITALS
WEIGHT: 315 LBS | OXYGEN SATURATION: 97 % | HEART RATE: 77 BPM | HEIGHT: 65 IN | BODY MASS INDEX: 52.48 KG/M2 | SYSTOLIC BLOOD PRESSURE: 142 MMHG | DIASTOLIC BLOOD PRESSURE: 86 MMHG

## 2023-09-18 DIAGNOSIS — F84.0 AUTISTIC DISORDER: ICD-10-CM

## 2023-09-18 DIAGNOSIS — I51.9 HEART DISEASE: ICD-10-CM

## 2023-09-18 DIAGNOSIS — F31.4 BIPOLAR 1 DISORDER, DEPRESSED, SEVERE (HCC): ICD-10-CM

## 2023-09-18 DIAGNOSIS — F90.1 ATTENTION DEFICIT HYPERACTIVITY DISORDER (ADHD), PREDOMINANTLY HYPERACTIVE TYPE: ICD-10-CM

## 2023-09-18 DIAGNOSIS — G47.33 OBSTRUCTIVE SLEEP APNEA SYNDROME: ICD-10-CM

## 2023-09-18 DIAGNOSIS — E11.9 TYPE 2 DIABETES MELLITUS WITHOUT COMPLICATION, WITHOUT LONG-TERM CURRENT USE OF INSULIN (HCC): Primary | ICD-10-CM

## 2023-09-18 PROCEDURE — G8417 CALC BMI ABV UP PARAM F/U: HCPCS | Performed by: STUDENT IN AN ORGANIZED HEALTH CARE EDUCATION/TRAINING PROGRAM

## 2023-09-18 PROCEDURE — G8427 DOCREV CUR MEDS BY ELIG CLIN: HCPCS | Performed by: STUDENT IN AN ORGANIZED HEALTH CARE EDUCATION/TRAINING PROGRAM

## 2023-09-18 PROCEDURE — 1036F TOBACCO NON-USER: CPT | Performed by: STUDENT IN AN ORGANIZED HEALTH CARE EDUCATION/TRAINING PROGRAM

## 2023-09-18 PROCEDURE — 3052F HG A1C>EQUAL 8.0%<EQUAL 9.0%: CPT | Performed by: STUDENT IN AN ORGANIZED HEALTH CARE EDUCATION/TRAINING PROGRAM

## 2023-09-18 PROCEDURE — 99213 OFFICE O/P EST LOW 20 MIN: CPT | Performed by: STUDENT IN AN ORGANIZED HEALTH CARE EDUCATION/TRAINING PROGRAM

## 2023-09-18 PROCEDURE — 2022F DILAT RTA XM EVC RTNOPTHY: CPT | Performed by: STUDENT IN AN ORGANIZED HEALTH CARE EDUCATION/TRAINING PROGRAM

## 2023-09-18 RX ORDER — DULAGLUTIDE 1.5 MG/.5ML
1.5 INJECTION, SOLUTION SUBCUTANEOUS WEEKLY
Qty: 2 ML | Refills: 0 | Status: CANCELLED | OUTPATIENT
Start: 2023-09-18 | End: 2023-10-16

## 2023-09-18 RX ORDER — DULAGLUTIDE 3 MG/.5ML
3 INJECTION, SOLUTION SUBCUTANEOUS WEEKLY
Qty: 4 ADJUSTABLE DOSE PRE-FILLED PEN SYRINGE | Refills: 0 | Status: SHIPPED | OUTPATIENT
Start: 2023-09-18 | End: 2023-10-16

## 2023-09-18 ASSESSMENT — ENCOUNTER SYMPTOMS
VOMITING: 0
COUGH: 0
SORE THROAT: 0
ABDOMINAL PAIN: 0
SINUS PRESSURE: 0
SHORTNESS OF BREATH: 0

## 2023-09-18 NOTE — PROGRESS NOTES
trulicity. Reviewed with the patient: current clinical status, medications, activities and diet. Close follow up to evaluate treatment results and for coordination of care. Time was given for questions. All questions were answered to the patients satisfaction.    5 minutes spent on reviewing previous notes, records and labs; 8 minutes spent on physical exam, obesity, medication and diet education; 6 minutes spent on finalizing chart note

## 2023-10-16 ENCOUNTER — OFFICE VISIT (OUTPATIENT)
Dept: FAMILY MEDICINE CLINIC | Age: 28
End: 2023-10-16
Payer: MEDICARE

## 2023-10-16 VITALS
SYSTOLIC BLOOD PRESSURE: 126 MMHG | BODY MASS INDEX: 52.48 KG/M2 | OXYGEN SATURATION: 97 % | WEIGHT: 315 LBS | TEMPERATURE: 98.2 F | HEART RATE: 94 BPM | HEIGHT: 65 IN | DIASTOLIC BLOOD PRESSURE: 74 MMHG

## 2023-10-16 DIAGNOSIS — E11.9 TYPE 2 DIABETES MELLITUS WITHOUT COMPLICATION, WITHOUT LONG-TERM CURRENT USE OF INSULIN (HCC): Primary | ICD-10-CM

## 2023-10-16 DIAGNOSIS — F90.1 ATTENTION DEFICIT HYPERACTIVITY DISORDER (ADHD), PREDOMINANTLY HYPERACTIVE TYPE: ICD-10-CM

## 2023-10-16 DIAGNOSIS — I51.9 HEART DISEASE: ICD-10-CM

## 2023-10-16 DIAGNOSIS — F84.0 AUTISTIC DISORDER: ICD-10-CM

## 2023-10-16 DIAGNOSIS — R63.5 ABNORMAL WEIGHT GAIN: ICD-10-CM

## 2023-10-16 DIAGNOSIS — G47.33 OBSTRUCTIVE SLEEP APNEA SYNDROME: ICD-10-CM

## 2023-10-16 DIAGNOSIS — F31.4 BIPOLAR 1 DISORDER, DEPRESSED, SEVERE (HCC): ICD-10-CM

## 2023-10-16 PROCEDURE — 1036F TOBACCO NON-USER: CPT | Performed by: STUDENT IN AN ORGANIZED HEALTH CARE EDUCATION/TRAINING PROGRAM

## 2023-10-16 PROCEDURE — G8417 CALC BMI ABV UP PARAM F/U: HCPCS | Performed by: STUDENT IN AN ORGANIZED HEALTH CARE EDUCATION/TRAINING PROGRAM

## 2023-10-16 PROCEDURE — 3052F HG A1C>EQUAL 8.0%<EQUAL 9.0%: CPT | Performed by: STUDENT IN AN ORGANIZED HEALTH CARE EDUCATION/TRAINING PROGRAM

## 2023-10-16 PROCEDURE — 2022F DILAT RTA XM EVC RTNOPTHY: CPT | Performed by: STUDENT IN AN ORGANIZED HEALTH CARE EDUCATION/TRAINING PROGRAM

## 2023-10-16 PROCEDURE — 83036 HEMOGLOBIN GLYCOSYLATED A1C: CPT | Performed by: STUDENT IN AN ORGANIZED HEALTH CARE EDUCATION/TRAINING PROGRAM

## 2023-10-16 PROCEDURE — 99214 OFFICE O/P EST MOD 30 MIN: CPT | Performed by: STUDENT IN AN ORGANIZED HEALTH CARE EDUCATION/TRAINING PROGRAM

## 2023-10-16 PROCEDURE — G8427 DOCREV CUR MEDS BY ELIG CLIN: HCPCS | Performed by: STUDENT IN AN ORGANIZED HEALTH CARE EDUCATION/TRAINING PROGRAM

## 2023-10-16 PROCEDURE — G8484 FLU IMMUNIZE NO ADMIN: HCPCS | Performed by: STUDENT IN AN ORGANIZED HEALTH CARE EDUCATION/TRAINING PROGRAM

## 2023-10-16 RX ORDER — OXCARBAZEPINE 300 MG/1
TABLET, FILM COATED ORAL
COMMUNITY
Start: 2023-10-05

## 2023-10-16 RX ORDER — DULAGLUTIDE 3 MG/.5ML
3 INJECTION, SOLUTION SUBCUTANEOUS WEEKLY
Qty: 4 ADJUSTABLE DOSE PRE-FILLED PEN SYRINGE | Refills: 0 | Status: CANCELLED | OUTPATIENT
Start: 2023-10-16 | End: 2023-11-13

## 2023-10-16 RX ORDER — METFORMIN HYDROCHLORIDE 750 MG/1
1500 TABLET, EXTENDED RELEASE ORAL
Qty: 60 TABLET | Refills: 0 | Status: SHIPPED | OUTPATIENT
Start: 2023-10-16 | End: 2023-11-15

## 2023-10-16 RX ORDER — DULAGLUTIDE 4.5 MG/.5ML
4.5 INJECTION, SOLUTION SUBCUTANEOUS WEEKLY
Qty: 4 ADJUSTABLE DOSE PRE-FILLED PEN SYRINGE | Refills: 0 | Status: SHIPPED | OUTPATIENT
Start: 2023-10-16

## 2023-10-16 ASSESSMENT — ENCOUNTER SYMPTOMS
SINUS PRESSURE: 0
VOMITING: 0
SORE THROAT: 0
SHORTNESS OF BREATH: 0
COUGH: 0
ABDOMINAL PAIN: 0

## 2023-10-16 NOTE — PROGRESS NOTES
blood pressure, cholesterol, blood sugars, etc.  - Recommended daily intake: 60-80 grams of protein spread throughout the day, 30-35 grams of fiber, <30 grams of added sugar, 64oz water, elimination of sugar sweetened beverages, limiting alcohol intake and eating out  - Briefly reviewed how to read a nutrition label focusing on serving size, calories, fiber, protein and added sugars  - Recommended food logging to learn about food and for accountability  - Discussed controlling the environment - not having \"junk food\" like cookies, chips, ice cream, etc. in the pantry/house  - Discussed myplate, increasing vegetables/fruits, alternative healthy snacking options  - Discussed importance of sleep and stress management for weight loss/maintenance  - Reviewed setting SMART goals  - Discussed mindfulness while eating/snacking, hunger/fullness scale, food as fuel, \"finishing your plate\"    Physical Activity  - Discussed NEAT  - Recommended 150 min per week of moderate-intensity physical activity for weight loss and 300 minutes per week of moderate-intensity PA for weight maintenance  - Discussed slowly increasing physical activity as tolerated with increases in frequency, duration and intensity  - Reviewed importance of 2-3 days per week of weight training for muscle maintenance with weight loss    Weight loss medications  - Discussed options including , , , Plenity, GLP-1s, metformin, orlistat, ,  and - Contraindications:  Adipex/Qsymia (hx CAD), Topamax/Contrave/Wellbutrin (already on mood meds, hx bipolar)     8/7  -Start GLP-1, no personal history of pancreatitis and no family history of thyroid cancer, Trulicity sent to pt pharmacy    8/21  - Tolerating Trulicity well, mild nausea, did have two defective pens, last dose 8/17  - Will increase to trulicity 7.6TL weekly, pt's mom will call [employer] and ask for replacement pens    9/18  -Tolerating well, did have an episode of nausea today however likely overate at lunch,

## 2023-10-23 PROBLEM — I10 PRIMARY HYPERTENSION: Status: ACTIVE | Noted: 2023-01-07

## 2023-10-23 PROBLEM — R45.4 OUTBURSTS OF ANGER: Status: ACTIVE | Noted: 2023-10-23

## 2023-10-23 PROBLEM — Z20.822 CONTACT WITH AND (SUSPECTED) EXPOSURE TO COVID-19: Status: ACTIVE | Noted: 2023-01-07

## 2023-11-03 ENCOUNTER — OFFICE VISIT (OUTPATIENT)
Dept: NEUROLOGY | Age: 28
End: 2023-11-03
Payer: MEDICARE

## 2023-11-03 VITALS
SYSTOLIC BLOOD PRESSURE: 128 MMHG | DIASTOLIC BLOOD PRESSURE: 62 MMHG | HEART RATE: 102 BPM | BODY MASS INDEX: 62.94 KG/M2 | WEIGHT: 315 LBS

## 2023-11-03 DIAGNOSIS — F90.1 ATTENTION DEFICIT HYPERACTIVITY DISORDER (ADHD), PREDOMINANTLY HYPERACTIVE TYPE: ICD-10-CM

## 2023-11-03 DIAGNOSIS — M62.9 DISORDER OF MUSCLE, UNSPECIFIED: ICD-10-CM

## 2023-11-03 DIAGNOSIS — G47.33 OBSTRUCTIVE SLEEP APNEA SYNDROME: ICD-10-CM

## 2023-11-03 DIAGNOSIS — R74.8 ABNORMAL CPK: ICD-10-CM

## 2023-11-03 DIAGNOSIS — G24.9 DYSKINESIA: Primary | ICD-10-CM

## 2023-11-03 DIAGNOSIS — G24.01 DRUG-INDUCED SUBACUTE DYSKINESIA: ICD-10-CM

## 2023-11-03 DIAGNOSIS — G24.9 DYSKINESIA: ICD-10-CM

## 2023-11-03 LAB
ALBUMIN SERPL-MCNC: 4.8 G/DL (ref 3.5–4.6)
ALP SERPL-CCNC: 72 U/L (ref 35–104)
ALT SERPL-CCNC: 26 U/L (ref 0–41)
AST SERPL-CCNC: 28 U/L (ref 0–40)
BILIRUB DIRECT SERPL-MCNC: <0.2 MG/DL (ref 0–0.4)
BILIRUB INDIRECT SERPL-MCNC: ABNORMAL MG/DL (ref 0–0.6)
BILIRUB SERPL-MCNC: 0.6 MG/DL (ref 0.2–0.7)
CK SERPL-CCNC: 574 U/L (ref 0–190)
PROT SERPL-MCNC: 8.2 G/DL (ref 6.3–8)
TSH REFLEX: 2.03 UIU/ML (ref 0.44–3.86)

## 2023-11-03 PROCEDURE — G8417 CALC BMI ABV UP PARAM F/U: HCPCS | Performed by: PSYCHIATRY & NEUROLOGY

## 2023-11-03 PROCEDURE — G8427 DOCREV CUR MEDS BY ELIG CLIN: HCPCS | Performed by: PSYCHIATRY & NEUROLOGY

## 2023-11-03 PROCEDURE — 1036F TOBACCO NON-USER: CPT | Performed by: PSYCHIATRY & NEUROLOGY

## 2023-11-03 PROCEDURE — 3074F SYST BP LT 130 MM HG: CPT | Performed by: PSYCHIATRY & NEUROLOGY

## 2023-11-03 PROCEDURE — 99214 OFFICE O/P EST MOD 30 MIN: CPT | Performed by: PSYCHIATRY & NEUROLOGY

## 2023-11-03 PROCEDURE — G8484 FLU IMMUNIZE NO ADMIN: HCPCS | Performed by: PSYCHIATRY & NEUROLOGY

## 2023-11-03 PROCEDURE — 3078F DIAST BP <80 MM HG: CPT | Performed by: PSYCHIATRY & NEUROLOGY

## 2023-11-03 RX ORDER — METOPROLOL SUCCINATE 50 MG/1
50 TABLET, EXTENDED RELEASE ORAL DAILY
COMMUNITY
Start: 2023-10-25

## 2023-11-03 ASSESSMENT — ENCOUNTER SYMPTOMS
NAUSEA: 0
VOMITING: 0
COLOR CHANGE: 0
TROUBLE SWALLOWING: 0
PHOTOPHOBIA: 0
CHOKING: 0
SHORTNESS OF BREATH: 0
BACK PAIN: 0

## 2023-11-03 NOTE — PROGRESS NOTES
Subjective:      Patient ID: Missy El is a 29 y.o. male who presents today for:  Chief Complaint   Patient presents with    6 Month Follow-Up     Pt states that he is doing okay. Pt states that he is trying to lose weight. Pt states no concerns at this time. HPI 28-year right-handed gentleman with a history of drug induced dyskinesias. Patient was started on Ingrezza and actually done very well. Patient has no side effects and is able to function very well. Patient has some suggestion of increased peak levels are always been high. Patient does not report of any muscle myalgic pain and has not any generalized weakness. He does have sleep apnea but does not use a CPAP machine. Patient has not any side effects of medication.     Past Medical History:   Diagnosis Date    ADHD (attention deficit hyperactivity disorder) 2012    Allergic rhinitis 2012    Autism     Hypertension     Obesity (BMI 30-39.9) 2012    Sleep apnea      Past Surgical History:   Procedure Laterality Date    TONSILLECTOMY       Social History     Socioeconomic History    Marital status: Single     Spouse name: Not on file    Number of children: Not on file    Years of education: Not on file    Highest education level: Not on file   Occupational History    Not on file   Tobacco Use    Smoking status: Former     Packs/day: 0.50     Years: 7.00     Additional pack years: 0.00     Total pack years: 3.50     Types: Cigarettes     Start date: 2013     Quit date: 2018     Years since quittin.4    Smokeless tobacco: Never   Vaping Use    Vaping Use: Never used   Substance and Sexual Activity    Alcohol use: Not Currently     Comment: occ    Drug use: No    Sexual activity: Not Currently   Other Topics Concern    Not on file   Social History Narrative    Not on file     Social Determinants of Health     Financial Resource Strain: Low Risk  (2023)    Overall Financial Resource Strain ([facility])

## 2023-11-09 DIAGNOSIS — E11.9 TYPE 2 DIABETES MELLITUS WITHOUT COMPLICATION, WITHOUT LONG-TERM CURRENT USE OF INSULIN (HCC): ICD-10-CM

## 2023-11-09 RX ORDER — METFORMIN HYDROCHLORIDE 750 MG/1
1500 TABLET, EXTENDED RELEASE ORAL
Qty: 60 TABLET | Refills: 5 | Status: SHIPPED | OUTPATIENT
Start: 2023-11-09 | End: 2024-01-02

## 2023-11-09 NOTE — TELEPHONE ENCOUNTER
Comments:     Last Office Visit (last PCP visit):   10/16/2023    Next Visit Date:  Future Appointments   Date Time Provider Department Center   11/13/2023  3:30 PM Addis Braun DO [facility]   5/7/2024  3:00 PM Meqluiades Lofton MD [facility]  NEUR Neurology -       **If hasn't been seen in over a year OR hasn't followed up according to last diabetes/ADHD visit, make appointment for patient before sending refill to provider.    Rx requested:  Requested Prescriptions     Pending Prescriptions Disp Refills    metFORMIN (GLUCOPHAGE-XR) 750 MG extended release tablet [Pharmacy Med Name: METFORMIN HCL  MG TABLET] 60 tablet 0     Sig: TAKE 2 TABLETS BY MOUTH DAILY (WITH BREAKFAST).

## 2023-11-13 ENCOUNTER — OFFICE VISIT (OUTPATIENT)
Dept: FAMILY MEDICINE CLINIC | Age: 28
End: 2023-11-13
Payer: MEDICARE

## 2023-11-13 ENCOUNTER — TELEPHONE (OUTPATIENT)
Dept: NEUROLOGY | Age: 28
End: 2023-11-13

## 2023-11-13 VITALS
HEIGHT: 65 IN | BODY MASS INDEX: 52.48 KG/M2 | HEART RATE: 100 BPM | DIASTOLIC BLOOD PRESSURE: 80 MMHG | OXYGEN SATURATION: 97 % | WEIGHT: 315 LBS | SYSTOLIC BLOOD PRESSURE: 138 MMHG

## 2023-11-13 DIAGNOSIS — F84.0 AUTISTIC DISORDER: ICD-10-CM

## 2023-11-13 DIAGNOSIS — I51.9 HEART DISEASE: ICD-10-CM

## 2023-11-13 DIAGNOSIS — G47.33 OBSTRUCTIVE SLEEP APNEA SYNDROME: ICD-10-CM

## 2023-11-13 DIAGNOSIS — R63.5 ABNORMAL WEIGHT GAIN: ICD-10-CM

## 2023-11-13 DIAGNOSIS — F90.1 ATTENTION DEFICIT HYPERACTIVITY DISORDER (ADHD), PREDOMINANTLY HYPERACTIVE TYPE: ICD-10-CM

## 2023-11-13 DIAGNOSIS — F31.4 BIPOLAR 1 DISORDER, DEPRESSED, SEVERE (HCC): ICD-10-CM

## 2023-11-13 DIAGNOSIS — E11.9 TYPE 2 DIABETES MELLITUS WITHOUT COMPLICATION, WITHOUT LONG-TERM CURRENT USE OF INSULIN (HCC): ICD-10-CM

## 2023-11-13 PROCEDURE — 2022F DILAT RTA XM EVC RTNOPTHY: CPT | Performed by: STUDENT IN AN ORGANIZED HEALTH CARE EDUCATION/TRAINING PROGRAM

## 2023-11-13 PROCEDURE — 3052F HG A1C>EQUAL 8.0%<EQUAL 9.0%: CPT | Performed by: STUDENT IN AN ORGANIZED HEALTH CARE EDUCATION/TRAINING PROGRAM

## 2023-11-13 PROCEDURE — 3075F SYST BP GE 130 - 139MM HG: CPT | Performed by: STUDENT IN AN ORGANIZED HEALTH CARE EDUCATION/TRAINING PROGRAM

## 2023-11-13 PROCEDURE — 4004F PT TOBACCO SCREEN RCVD TLK: CPT | Performed by: STUDENT IN AN ORGANIZED HEALTH CARE EDUCATION/TRAINING PROGRAM

## 2023-11-13 PROCEDURE — G8484 FLU IMMUNIZE NO ADMIN: HCPCS | Performed by: STUDENT IN AN ORGANIZED HEALTH CARE EDUCATION/TRAINING PROGRAM

## 2023-11-13 PROCEDURE — G8427 DOCREV CUR MEDS BY ELIG CLIN: HCPCS | Performed by: STUDENT IN AN ORGANIZED HEALTH CARE EDUCATION/TRAINING PROGRAM

## 2023-11-13 PROCEDURE — 3079F DIAST BP 80-89 MM HG: CPT | Performed by: STUDENT IN AN ORGANIZED HEALTH CARE EDUCATION/TRAINING PROGRAM

## 2023-11-13 PROCEDURE — G8417 CALC BMI ABV UP PARAM F/U: HCPCS | Performed by: STUDENT IN AN ORGANIZED HEALTH CARE EDUCATION/TRAINING PROGRAM

## 2023-11-13 PROCEDURE — 99214 OFFICE O/P EST MOD 30 MIN: CPT | Performed by: STUDENT IN AN ORGANIZED HEALTH CARE EDUCATION/TRAINING PROGRAM

## 2023-11-13 RX ORDER — DULAGLUTIDE 4.5 MG/.5ML
4.5 INJECTION, SOLUTION SUBCUTANEOUS WEEKLY
Qty: 4 ADJUSTABLE DOSE PRE-FILLED PEN SYRINGE | Refills: 5 | Status: SHIPPED | OUTPATIENT
Start: 2023-11-13 | End: 2023-11-16 | Stop reason: SINTOL

## 2023-11-13 ASSESSMENT — ENCOUNTER SYMPTOMS
SORE THROAT: 0
SINUS PRESSURE: 0
SHORTNESS OF BREATH: 0
VOMITING: 0
COUGH: 0
ABDOMINAL PAIN: 0

## 2023-11-13 NOTE — TELEPHONE ENCOUNTER
Spoke with mother. She will call [facility] in [place] to get Tomer's refill for amiza.  I had received a fax last week stating that they could not reach him.    Kimberlyn aMy

## 2023-11-13 NOTE — PROGRESS NOTES
Discussed myplate, increasing vegetables/fruits, alternative healthy snacking options  - Discussed importance of sleep and stress management for weight loss/maintenance  - Reviewed setting SMART goals  - Discussed mindfulness while eating/snacking, hunger/fullness scale, food as fuel, \"finishing your plate\"    Physical Activity  - Discussed NEAT  - Recommended 150 min per week of moderate-intensity physical activity for weight loss and 300 minutes per week of moderate-intensity PA for weight maintenance  - Discussed slowly increasing physical activity as tolerated with increases in frequency, duration and intensity  - Reviewed importance of 2-3 days per week of weight training for muscle maintenance with weight loss    Weight loss medications  - Discussed options including , , , Plenity, GLP-1s, metformin, orlistat, ,  and - Contraindications:  Adipex/Qsymia (hx CAD), Topamax/Contrave/Wellbutrin (already on mood meds, hx bipolar)     8/7  -Start GLP-1, no personal history of pancreatitis and no family history of thyroid cancer, Trulicity sent to pt pharmacy    8/21  - Tolerating Trulicity well, mild nausea, did have two defective pens, last dose 8/17  - Will increase to trulicity 5.9GO weekly, pt's mom will call [employer] and ask for replacement pens    9/18  -Tolerating well, did have an episode of nausea today however likely overate at lunch, increased to Trulicity 3 mg weekly  -Discussed importance of paying close attention to portion sizes with GLP-1's to reduce side effects    10/16  - Doing well on Trulicity 3 mg weekly, has been having some constipation, will increase to Trulicity 4.5 mg weekly  - We will also increase metformin from 750 mg ER to Metformin 1500 mg ER daily to help with diabetes, weight loss and hopefully improve constipation  - Could consider adding on Abran for constipation and weight loss  - Consider referral for nutritionist at follow-up appointment if weight remains plateaued    38/71  - Doing

## 2023-11-15 ENCOUNTER — HOSPITAL ENCOUNTER (EMERGENCY)
Age: 28
Discharge: HOME | End: 2023-11-15
Attending: EMERGENCY MEDICINE
Payer: MEDICARE

## 2023-11-15 VITALS
OXYGEN SATURATION: 96 % | TEMPERATURE: 97.6 F | HEART RATE: 88 BPM | DIASTOLIC BLOOD PRESSURE: 83 MMHG | RESPIRATION RATE: 20 BRPM | SYSTOLIC BLOOD PRESSURE: 135 MMHG

## 2023-11-15 DIAGNOSIS — H61.23 BILATERAL IMPACTED CERUMEN: ICD-10-CM

## 2023-11-15 DIAGNOSIS — H92.02 OTALGIA OF LEFT EAR: ICD-10-CM

## 2023-11-15 DIAGNOSIS — H65.03 BILATERAL ACUTE SEROUS OTITIS MEDIA, RECURRENCE NOT SPECIFIED: Primary | ICD-10-CM

## 2023-11-15 DIAGNOSIS — H92.01 OTALGIA OF RIGHT EAR: ICD-10-CM

## 2023-11-15 PROCEDURE — 99283 EMERGENCY DEPT VISIT LOW MDM: CPT

## 2023-11-15 RX ORDER — AMOXICILLIN 875 MG/1
875 TABLET, COATED ORAL 2 TIMES DAILY
Qty: 20 TABLET | Refills: 0 | Status: SHIPPED | OUTPATIENT
Start: 2023-11-15 | End: 2023-11-25

## 2023-11-15 ASSESSMENT — PAIN DESCRIPTION - ORIENTATION: ORIENTATION: LEFT;RIGHT

## 2023-11-15 ASSESSMENT — PAIN DESCRIPTION - LOCATION: LOCATION: EAR

## 2023-11-15 ASSESSMENT — PAIN - FUNCTIONAL ASSESSMENT: PAIN_FUNCTIONAL_ASSESSMENT: 0-10

## 2023-11-15 ASSESSMENT — PAIN SCALES - GENERAL: PAINLEVEL_OUTOF10: 5

## 2023-11-15 NOTE — ED NOTES
Discharge  instructions given and reviewed. Patient verbalized understanding. Patient ambulated out of ED with a steady gait to POV.       Sarah Martines RN  11/15/23 4730

## 2023-11-15 NOTE — ED PROVIDER NOTES
[facility] ED  eMERGENCY dEPARTMENT eNCOUnter      Pt Name: Masha Reyes  MRN: [id number]  [address] [facility] 1995  Date of evaluation: 11/15/2023  Provider: Jasmeet Higginbotham MD  Patient seen at 1800 hrs. on 11/15/2023. CHIEF COMPLAINT       Chief Complaint   Patient presents with    Otalgia     Both ears 5/10 (want them to be checked out (started yesterday)         HISTORY OF PRESENT ILLNESS   (Location/Symptom, Timing/Onset,Context/Setting, Quality, Duration, Modifying Factors, Severity)  Note limiting factors. Masha Reyes is a 29 y.o. male who presents to the emergency department complaining of bilateral ear pain for the last 3 to 4 days. Worse with palpation of the ear area better with rest.  Patient has no loss of hearing. Patient denies any headache blurry double vision neck pain back pain nausea vomiting diarrhea diaphoresis heat or cold intolerance rash. Pain is mild. Pressure-like in nature. HPI    NursingNotes were reviewed. REVIEW OF SYSTEMS    (2-9 systems for level 4, 10 or more for level 5)     Review of Systems   All other systems reviewed and are negative. Except as noted above the remainder of the review of systems was reviewed and negative. PAST MEDICAL HISTORY     Past Medical History:   Diagnosis Date    ADHD (attention deficit hyperactivity disorder) 04/04/2012    Allergic rhinitis 04/04/2012    Autism     Hypertension     Obesity (BMI 30-39.9) 04/04/2012    Sleep apnea          SURGICALHISTORY       Past Surgical History:   Procedure Laterality Date    TONSILLECTOMY           CURRENT MEDICATIONS       Discharge Medication List as of 11/15/2023  6:36 PM        CONTINUE these medications which have NOT CHANGED    Details   Dulaglutide (TRULICITY) 4.5 OO/9.4RR SOPN Inject 4.5 mg into the skin once a week, Disp-4 Adjustable Dose Pre-filled Pen Syringe, R-5Normal      metFORMIN (GLUCOPHAGE-XR) 750 MG extended release tablet TAKE 2 TABLETS BY MOUTH DAILY (WITH BREAKFAST). ,

## 2023-11-16 ENCOUNTER — TELEPHONE (OUTPATIENT)
Dept: FAMILY MEDICINE CLINIC | Age: 28
End: 2023-11-16

## 2023-11-16 DIAGNOSIS — E11.9 TYPE 2 DIABETES MELLITUS WITHOUT COMPLICATION, WITHOUT LONG-TERM CURRENT USE OF INSULIN (HCC): Primary | ICD-10-CM

## 2023-11-16 RX ORDER — DULAGLUTIDE 3 MG/.5ML
3 INJECTION, SOLUTION SUBCUTANEOUS WEEKLY
Qty: 4 ADJUSTABLE DOSE PRE-FILLED PEN SYRINGE | Refills: 0 | Status: SHIPPED | OUTPATIENT
Start: 2023-11-16

## 2023-11-16 NOTE — TELEPHONE ENCOUNTER
----- Message from Huseyin Higgins sent at 11/16/2023  9:44 AM EST -----  Subject: Medication Problem    Medication: Dulaglutide (TRULICITY) 4.5 SM/6.7ZI SOPN  Dosage: one shot once a week  Ordering Provider: demetrice    Question/Problem: Pt seen in the ER yesterday 11/15/2023 and ER Dr advised   to have Dr Yousif Barclay lower the dosage of the Trulicity as it is causing   issues with Pt's blood pressure.        Pharmacy: [facility] [address] [place], [place] [phone] Ashlyn Guzman [phone]    ---------------------------------------------------------------------------  --------------  Lisa Rodrigez INFO  [phone]; OK to leave message on voicemail  ---------------------------------------------------------------------------  --------------    SCRIPT ANSWERS  Relationship to Patient: Parent  Representative Name: Vivek Novoa  Patient is under 25 and the Parent has custody: No  Is the representative on the Communication Release of Information (ZAK)   form in Epic: Yes none

## 2023-11-24 PROBLEM — G47.30 SLEEP APNEA TREATED WITH NOCTURNAL BIPAP: Status: ACTIVE | Noted: 2023-11-24

## 2023-11-24 PROBLEM — I10 PRIMARY HYPERTENSION: Status: ACTIVE | Noted: 2023-11-24

## 2023-11-24 PROBLEM — I51.9 ILL-DEFINED HEART DISEASE: Status: ACTIVE | Noted: 2023-11-24

## 2023-11-24 PROBLEM — M62.82 RHABDOMYOLYSIS: Status: ACTIVE | Noted: 2023-11-24

## 2023-11-24 PROBLEM — F84.0 AUTISM (HHS-HCC): Status: ACTIVE | Noted: 2023-11-24

## 2023-11-24 PROBLEM — R21 RASH AND OTHER NONSPECIFIC SKIN ERUPTION: Status: ACTIVE | Noted: 2021-04-28

## 2023-11-24 PROBLEM — E87.6 HYPOKALEMIA: Status: ACTIVE | Noted: 2023-11-24

## 2023-11-24 RX ORDER — ALLOPURINOL 100 MG/1
100 TABLET ORAL DAILY
COMMUNITY

## 2023-11-24 RX ORDER — LOSARTAN POTASSIUM 100 MG/1
1 TABLET ORAL DAILY
COMMUNITY
Start: 2020-02-06 | End: 2023-12-07 | Stop reason: SDUPTHER

## 2023-11-24 RX ORDER — HYDROCHLOROTHIAZIDE 25 MG/1
1 TABLET ORAL DAILY
COMMUNITY
Start: 2020-09-03 | End: 2023-12-07 | Stop reason: SDUPTHER

## 2023-11-24 RX ORDER — LORAZEPAM 0.5 MG/1
0.5 TABLET ORAL EVERY 6 HOURS PRN
COMMUNITY

## 2023-11-24 RX ORDER — ACETAMINOPHEN 500 MG
1 TABLET ORAL DAILY
COMMUNITY

## 2023-11-24 RX ORDER — DULAGLUTIDE 3 MG/.5ML
INJECTION, SOLUTION SUBCUTANEOUS
COMMUNITY

## 2023-11-24 RX ORDER — AMLODIPINE BESYLATE 10 MG/1
1 TABLET ORAL DAILY
COMMUNITY
Start: 2021-03-03 | End: 2023-12-07 | Stop reason: SDUPTHER

## 2023-11-24 RX ORDER — OXCARBAZEPINE 150 MG/1
150 TABLET, FILM COATED ORAL 2 TIMES DAILY
COMMUNITY

## 2023-11-26 ENCOUNTER — APPOINTMENT (OUTPATIENT)
Dept: GENERAL RADIOLOGY | Age: 28
End: 2023-11-26
Payer: MEDICARE

## 2023-11-26 ENCOUNTER — HOSPITAL ENCOUNTER (EMERGENCY)
Age: 28
Discharge: HOME | End: 2023-11-26
Payer: MEDICARE

## 2023-11-26 VITALS
RESPIRATION RATE: 20 BRPM | SYSTOLIC BLOOD PRESSURE: 148 MMHG | OXYGEN SATURATION: 98 % | TEMPERATURE: 98.7 F | DIASTOLIC BLOOD PRESSURE: 79 MMHG | HEART RATE: 97 BPM

## 2023-11-26 DIAGNOSIS — U07.1 COVID-19: Primary | ICD-10-CM

## 2023-11-26 LAB
INFLUENZA A BY PCR: NEGATIVE
INFLUENZA B BY PCR: NEGATIVE
SARS-COV-2 RDRP RESP QL NAA+PROBE: DETECTED

## 2023-11-26 PROCEDURE — 87635 SARS-COV-2 COVID-19 AMP PRB: CPT

## 2023-11-26 PROCEDURE — 87502 INFLUENZA DNA AMP PROBE: CPT

## 2023-11-26 PROCEDURE — 99284 EMERGENCY DEPT VISIT MOD MDM: CPT

## 2023-11-26 PROCEDURE — 71045 X-RAY EXAM CHEST 1 VIEW: CPT

## 2023-11-26 RX ORDER — BENZONATATE 100 MG/1
100 CAPSULE ORAL 3 TIMES DAILY PRN
Qty: 20 CAPSULE | Refills: 0 | Status: SHIPPED | OUTPATIENT
Start: 2023-11-26

## 2023-11-26 RX ORDER — ONDANSETRON 4 MG/1
4 TABLET, ORALLY DISINTEGRATING ORAL 3 TIMES DAILY PRN
Qty: 12 TABLET | Refills: 0 | Status: SHIPPED | OUTPATIENT
Start: 2023-11-26 | End: 2023-12-08

## 2023-11-26 ASSESSMENT — ENCOUNTER SYMPTOMS
SORE THROAT: 0
COUGH: 1
EYE DISCHARGE: 0
CONSTIPATION: 0
SHORTNESS OF BREATH: 0
ABDOMINAL DISTENTION: 0
ABDOMINAL PAIN: 0
COLOR CHANGE: 0
RHINORRHEA: 0

## 2023-11-26 NOTE — ED PROVIDER NOTES
[facility] ED  EMERGENCY DEPARTMENT ENCOUNTER      Pt Name: Jonatan Hicks  MRN: [id number]  [address] [facility] 1995  Date of evaluation: 11/26/2023  Provider: Sb Bridges PA-C  6:24 AM EST    CHIEF COMPLAINT       Chief Complaint   Patient presents with    Cough     Head and chest congestion         HISTORY OF PRESENT ILLNESS   (Location/Symptom, Timing/Onset, Context/Setting, Quality, Duration, Modifying Factors, Severity)  Note limiting factors. Jonatan Hicks is a 29 y.o. male who presents to the emergency department with complaint of cough, congestion, which patient states been ongoing for last 2 days. ,  Patient states he is currently on amoxicillin for otitis media, he has been out for approximately last 5 days, he denies any fever, cough is dry nonproductive, he denies any shortness of breath. He states he has other family members who are ill as well. Patient denies any pain, 0-10. Past medical history significant for sleep apnea, hypertension, ADHD, autism, diabetes, bipolar disorder. HPI    Nursing Notes were reviewed. REVIEW OF SYSTEMS    (2-9 systems for level 4, 10 or more for level 5)     Review of Systems   Constitutional:  Negative for activity change and appetite change. HENT:  Positive for congestion. Negative for ear discharge, ear pain, nosebleeds, rhinorrhea and sore throat. Eyes:  Negative for discharge. Respiratory:  Positive for cough. Negative for shortness of breath. Cardiovascular:  Negative for chest pain, palpitations and leg swelling. Gastrointestinal:  Negative for abdominal distention, abdominal pain and constipation. Genitourinary:  Negative for difficulty urinating and dysuria. Musculoskeletal:  Negative for arthralgias. Skin:  Negative for color change. Neurological:  Negative for dizziness, syncope, numbness and headaches. Psychiatric/Behavioral:  Negative for agitation and confusion.         Except as noted above the remainder of the

## 2023-11-26 NOTE — ED TRIAGE NOTES
Pt arrived to triage via EMS. Pt c/o cough and congestion for the past week. Pt states he has a productive cough. Pt also c/o head and chest congestion.

## 2023-11-28 ENCOUNTER — OFFICE VISIT (OUTPATIENT)
Dept: CARDIOLOGY | Facility: CLINIC | Age: 28
End: 2023-11-28
Payer: MEDICARE

## 2023-11-28 NOTE — PROGRESS NOTES
Patient was last seen by me in September 2022.  Subjective :           History so Far :  Patient with bipolar disorder, hypertension, morbid obesity, hypokalemia, presents for follow-up after echocardiogram, accompanied by mother to the office.     Reports feeling well overall, unable to tolerate CPAP, compliant with medications, does not report any cardiac symptoms.  Laboratory data from March 2022 reviewed. Hemoglobin 14.2 hematocrit 41.2 in June 2022 sodium 139 potassium 3.9 glucose 66 GFR greater than 60 liver enzymes were normal  Echo 8/31/2022-LVEF 50 to 55% normal diastolic filling pattern normal RV size and function grossly normal valves no pericardial effusion LV end-systolic dimension 4 cm LV posterior wall 1.2 cm septum 1 cm left atrium 4 cm left atrial volume index 46.2 mL/mÂ²     Assessment and Plan :  1. As far as hypertension goes, continue current regimen, work on weight loss and decreased consumption of salty food.  2. Obstructive sleep apnea-will be beneficial if he could tolerate CPAP, but he says he is unable to, he gets claustrophobia with CPAP therapy. We will told him to use it while he is awake for short periods of time, such as when he is sitting down watching television etc., and gradually increase the time that he uses the CPAP and then try it for at least a few hours at night. Patient's mother was also with him during this visit and expressed understanding.  3. Weight loss is very important, and this was stressed     Patient can follow-up with me in 1 year, basic metabolic profile prior to next visit.     Objective      Wt Readings from Last 3 Encounters:   09/23/22 (!) 154 kg (340 lb)   12/23/21 (!) 140 kg (309 lb)            Physical Exam:    GENERAL APPEARANCE: in no acute distress.  CHEST: Symmetric and non-tender.  INTEGUMENT: Skin warm and dry  HEENT: No gross abnormalities identified.No pallor or scleral icterus.  NECK: Supple, no JVD, no bruit.   NEURO/PSHCY: Alert and oriented  x3; appropriate behavior and responses and responses  LUNGS: Clear to auscultation bilaterally; normal respiratory effort.  HEART: Rate and rhythm regular with no evident murmur; no gallop appreciated.   ABDOMEN: Soft, non tender.  MUSCULOSKELETAL: No gross deformities.  EXTREMITIES: Warm  There is no edema noted.    Meds:  Current Outpatient Medications   Medication Instructions    allopurinol (ZYLOPRIM) 100 mg, oral, Daily    amLODIPine (Norvasc) 10 mg tablet 1 tablet, oral, Daily    cholecalciferol (Vitamin D-3) 50 mcg (2,000 unit) capsule 1 capsule, oral, Daily    dulaglutide (Trulicity) 3 mg/0.5 mL pen injector subcutaneous (via wearable injector)    hydroCHLOROthiazide (HYDRODiuril) 25 mg tablet 1 tablet, oral, Daily    LORazepam (ATIVAN) 0.5 mg, oral, Every 6 hours PRN    losartan (Cozaar) 100 mg tablet 1 tablet, oral, Daily    OXcarbazepine (TRILEPTAL) 150 mg, oral, 2 times daily    valbenazine tosylate (Ingrezza) 80 mg capsule 1 capsule, oral, Daily          No Known Allergies          LABS:    Lab Results   Component Value Date    WBC 5.7 01/07/2023    HGB 15.0 01/07/2023    HCT 43.9 01/07/2023     01/07/2023    ALT 30 01/07/2023    AST 26 01/07/2023     01/07/2023    K 3.9 01/07/2023     01/07/2023    CREATININE 1.08 01/07/2023    BUN 16 01/07/2023    CO2 27 01/07/2023    TSH 1.20 01/07/2023                   Problem List:    Patient Active Problem List    Diagnosis Date Noted    Autism 11/24/2023    Hypokalemia 11/24/2023    Ill-defined heart disease 11/24/2023    Primary hypertension 11/24/2023    Rhabdomyolysis 11/24/2023    Sleep apnea treated with nocturnal BiPAP 11/24/2023    Rash and other nonspecific skin eruption 04/28/2021                 Assessment:  No problem-specific Assessment & Plan notes found for this encounter.     Assessment and Plan :  1. As far as hypertension goes, continue current regimen, work on weight loss and decreased consumption of salty food.  2.  Obstructive sleep apnea-will be beneficial if he could tolerate CPAP, but he says he is unable to, he gets claustrophobia with CPAP therapy. We will told him to use it while he is awake for short periods of time, such as when he is sitting down watching television etc., and gradually increase the time that he uses the CPAP and then try it for at least a few hours at night. Patient's mother was also with him during this visit and expressed understanding.  3. Weight loss is very important, and this was stressed     Patient can follow-up with me in 1 year, basic metabolic profile prior to next visit.         Follow up : {follow up:46979}      Tonie Spence MD

## 2023-12-06 ENCOUNTER — APPOINTMENT (OUTPATIENT)
Dept: CARDIOLOGY | Facility: CLINIC | Age: 28
End: 2023-12-06
Payer: MEDICARE

## 2023-12-07 ENCOUNTER — OFFICE VISIT (OUTPATIENT)
Dept: CARDIOLOGY | Facility: CLINIC | Age: 28
End: 2023-12-07
Payer: MEDICARE

## 2023-12-07 VITALS
HEIGHT: 66 IN | WEIGHT: 315 LBS | DIASTOLIC BLOOD PRESSURE: 83 MMHG | BODY MASS INDEX: 50.62 KG/M2 | HEART RATE: 90 BPM | SYSTOLIC BLOOD PRESSURE: 134 MMHG

## 2023-12-07 DIAGNOSIS — Z86.16 PERSONAL HISTORY OF COVID-19: ICD-10-CM

## 2023-12-07 DIAGNOSIS — E78.2 MIXED HYPERLIPIDEMIA: ICD-10-CM

## 2023-12-07 DIAGNOSIS — I10 PRIMARY HYPERTENSION: Primary | ICD-10-CM

## 2023-12-07 DIAGNOSIS — E11.9 DIABETES MELLITUS TYPE II, NON INSULIN DEPENDENT (MULTI): ICD-10-CM

## 2023-12-07 DIAGNOSIS — G47.30 SLEEP APNEA TREATED WITH NOCTURNAL BIPAP: ICD-10-CM

## 2023-12-07 PROBLEM — Z79.899 MEDICATION COURSE CHANGED: Status: ACTIVE | Noted: 2023-12-07

## 2023-12-07 PROCEDURE — 4010F ACE/ARB THERAPY RXD/TAKEN: CPT | Performed by: INTERNAL MEDICINE

## 2023-12-07 PROCEDURE — 99213 OFFICE O/P EST LOW 20 MIN: CPT | Performed by: INTERNAL MEDICINE

## 2023-12-07 PROCEDURE — 3075F SYST BP GE 130 - 139MM HG: CPT | Performed by: INTERNAL MEDICINE

## 2023-12-07 PROCEDURE — 4004F PT TOBACCO SCREEN RCVD TLK: CPT | Performed by: INTERNAL MEDICINE

## 2023-12-07 PROCEDURE — 3079F DIAST BP 80-89 MM HG: CPT | Performed by: INTERNAL MEDICINE

## 2023-12-07 RX ORDER — ATORVASTATIN CALCIUM 20 MG/1
20 TABLET, FILM COATED ORAL DAILY
Qty: 30 TABLET | Refills: 11 | Status: SHIPPED | OUTPATIENT
Start: 2023-12-07 | End: 2024-12-06

## 2023-12-07 RX ORDER — AMLODIPINE BESYLATE 10 MG/1
10 TABLET ORAL DAILY
Qty: 90 TABLET | Refills: 3 | Status: SHIPPED | OUTPATIENT
Start: 2023-12-07 | End: 2024-12-06

## 2023-12-07 RX ORDER — METFORMIN HYDROCHLORIDE 500 MG/1
500 TABLET ORAL
COMMUNITY

## 2023-12-07 RX ORDER — LOSARTAN POTASSIUM 100 MG/1
100 TABLET ORAL DAILY
Qty: 90 TABLET | Refills: 3 | Status: SHIPPED | OUTPATIENT
Start: 2023-12-07 | End: 2024-12-06

## 2023-12-07 RX ORDER — HYDROCHLOROTHIAZIDE 25 MG/1
25 TABLET ORAL DAILY
Qty: 90 TABLET | Refills: 3 | Status: SHIPPED | OUTPATIENT
Start: 2023-12-07 | End: 2024-12-06

## 2023-12-07 NOTE — PROGRESS NOTES
Patient is accompanied by mother.  Interval history is notable for the fact that he came down with COVID, characterized by bronchitis.  She reports feeling overall good.  He holds a job.  She is working with a physician on some weight loss strategies.  He may be a candidate for Wegovy or Ozempic.  Blood pressure has improved considerably.  To target today, tolerating current medical regimen.    No recent lipid profile, previous 1 from months ago, showed LDL in the 90s.    History so Far :  Patient with bipolar disorder, hypertension, morbid obesity, hypokalemia, presents for follow-up after echocardiogram, accompanied by mother to the office.     Reports feeling well overall, unable to tolerate CPAP, compliant with medications, does not report any cardiac symptoms.  Laboratory data from March 2022 reviewed. Hemoglobin 14.2 hematocrit 41.2 in June 2022 sodium 139 potassium 3.9 glucose 66 GFR greater than 60 liver enzymes were normal  Echo 8/31/2022-LVEF 50 to 55% normal diastolic filling pattern normal RV size and function grossly normal valves no pericardial effusion LV end-systolic dimension 4 cm LV posterior wall 1.2 cm septum 1 cm left atrium 4 cm left atrial volume index 46.2 mL/mÂ²    Objective      Wt Readings from Last 3 Encounters:   12/07/23 (!) 169 kg (372 lb)   09/23/22 (!) 154 kg (340 lb)   12/23/21 (!) 140 kg (309 lb)            Physical Exam:      Patient is awake alert oriented x3, no acute distress  Lungs are clear breath sounds are distant due to body habitus  Heart sounds are regular without murmur rub or gallop  Extremities show no edema  Ambulates without assistance.  Meds:  Current Outpatient Medications   Medication Instructions    allopurinol (ZYLOPRIM) 100 mg, oral, Daily    amLODIPine (Norvasc) 10 mg tablet 1 tablet, oral, Daily    cholecalciferol (Vitamin D-3) 50 mcg (2,000 unit) capsule 1 capsule, oral, Daily    dulaglutide (Trulicity) 3 mg/0.5 mL pen injector subcutaneous (via wearable  injector)    hydroCHLOROthiazide (HYDRODiuril) 25 mg tablet 1 tablet, oral, Daily    LORazepam (ATIVAN) 0.5 mg, oral, Every 6 hours PRN    losartan (Cozaar) 100 mg tablet 1 tablet, oral, Daily    OXcarbazepine (TRILEPTAL) 150 mg, oral, 2 times daily    valbenazine tosylate (Ingrezza) 80 mg capsule 1 capsule, oral, Daily          No Known Allergies          LABS:    Lab Results   Component Value Date    WBC 5.7 01/07/2023    HGB 15.0 01/07/2023    HCT 43.9 01/07/2023     01/07/2023    ALT 30 01/07/2023    AST 26 01/07/2023     01/07/2023    K 3.9 01/07/2023     01/07/2023    CREATININE 1.08 01/07/2023    BUN 16 01/07/2023    CO2 27 01/07/2023    TSH 1.20 01/07/2023    HGBA1C 8.7 (H) 06/01/2023                   Problem List:    Patient Active Problem List    Diagnosis Date Noted    Autism 11/24/2023    Hypokalemia 11/24/2023    Ill-defined heart disease 11/24/2023    Primary hypertension 11/24/2023    Rhabdomyolysis 11/24/2023    Sleep apnea treated with nocturnal BiPAP 11/24/2023    Rash and other nonspecific skin eruption 04/28/2021                 Assessment:  1.  Primary hypertension-blood pressure is controlled on current regimen, patient reports compliance from heart healthy choices  2.  Morbid obesity with BMI in excess of 60-makes any type of imaging study very challenging.  Echo shows overall preserved LV systolic function  3.  Hypokalemia-due to excess loss of potassium  4.  Non-insulin-dependent diabetes-no symptoms of hypoglycemia  5.  LDL goal 70 or below  6.  Medication course changed      Recommendations:  1.  Continue efforts at weight loss, best directed by physicians.  2.  I suggested atorvastatin 20 mg daily, prescription was provided  3.  Follow-up in November 2024  4.  Comprehensive profile lipid profile hemoglobin A1c prior to next visit       Follow up : 1 year      Tonie Spence MD

## 2023-12-29 DIAGNOSIS — E11.9 TYPE 2 DIABETES MELLITUS WITHOUT COMPLICATION, WITHOUT LONG-TERM CURRENT USE OF INSULIN (HCC): ICD-10-CM

## 2023-12-29 RX ORDER — DULAGLUTIDE 3 MG/.5ML
3 INJECTION, SOLUTION SUBCUTANEOUS WEEKLY
Qty: 4 ADJUSTABLE DOSE PRE-FILLED PEN SYRINGE | Refills: 0 | Status: CANCELLED | OUTPATIENT
Start: 2023-12-29

## 2024-01-02 ENCOUNTER — OFFICE VISIT (OUTPATIENT)
Dept: FAMILY MEDICINE CLINIC | Age: 29
End: 2024-01-02
Payer: MEDICARE

## 2024-01-02 VITALS
HEIGHT: 65 IN | HEART RATE: 88 BPM | WEIGHT: 315 LBS | BODY MASS INDEX: 52.48 KG/M2 | SYSTOLIC BLOOD PRESSURE: 130 MMHG | OXYGEN SATURATION: 97 % | DIASTOLIC BLOOD PRESSURE: 80 MMHG

## 2024-01-02 DIAGNOSIS — R63.5 ABNORMAL WEIGHT GAIN: ICD-10-CM

## 2024-01-02 DIAGNOSIS — I51.9 HEART DISEASE: ICD-10-CM

## 2024-01-02 DIAGNOSIS — G47.33 OBSTRUCTIVE SLEEP APNEA SYNDROME: ICD-10-CM

## 2024-01-02 DIAGNOSIS — F90.1 ATTENTION DEFICIT HYPERACTIVITY DISORDER (ADHD), PREDOMINANTLY HYPERACTIVE TYPE: ICD-10-CM

## 2024-01-02 DIAGNOSIS — F84.0 AUTISTIC DISORDER: ICD-10-CM

## 2024-01-02 DIAGNOSIS — E11.9 TYPE 2 DIABETES MELLITUS WITHOUT COMPLICATION, WITHOUT LONG-TERM CURRENT USE OF INSULIN (HCC): Primary | ICD-10-CM

## 2024-01-02 DIAGNOSIS — F31.4 BIPOLAR 1 DISORDER, DEPRESSED, SEVERE (HCC): ICD-10-CM

## 2024-01-02 PROBLEM — E11.69 DIABETES MELLITUS TYPE 2 IN OBESE (HCC): Status: ACTIVE | Noted: 2024-01-02

## 2024-01-02 PROBLEM — E66.9 DIABETES MELLITUS TYPE 2 IN OBESE (HCC): Status: ACTIVE | Noted: 2024-01-02

## 2024-01-02 PROCEDURE — 3079F DIAST BP 80-89 MM HG: CPT | Performed by: STUDENT IN AN ORGANIZED HEALTH CARE EDUCATION/TRAINING PROGRAM

## 2024-01-02 PROCEDURE — 99214 OFFICE O/P EST MOD 30 MIN: CPT | Performed by: STUDENT IN AN ORGANIZED HEALTH CARE EDUCATION/TRAINING PROGRAM

## 2024-01-02 PROCEDURE — G8417 CALC BMI ABV UP PARAM F/U: HCPCS | Performed by: STUDENT IN AN ORGANIZED HEALTH CARE EDUCATION/TRAINING PROGRAM

## 2024-01-02 PROCEDURE — G8484 FLU IMMUNIZE NO ADMIN: HCPCS | Performed by: STUDENT IN AN ORGANIZED HEALTH CARE EDUCATION/TRAINING PROGRAM

## 2024-01-02 PROCEDURE — 3046F HEMOGLOBIN A1C LEVEL >9.0%: CPT | Performed by: STUDENT IN AN ORGANIZED HEALTH CARE EDUCATION/TRAINING PROGRAM

## 2024-01-02 PROCEDURE — 2022F DILAT RTA XM EVC RTNOPTHY: CPT | Performed by: STUDENT IN AN ORGANIZED HEALTH CARE EDUCATION/TRAINING PROGRAM

## 2024-01-02 PROCEDURE — 4004F PT TOBACCO SCREEN RCVD TLK: CPT | Performed by: STUDENT IN AN ORGANIZED HEALTH CARE EDUCATION/TRAINING PROGRAM

## 2024-01-02 PROCEDURE — G8427 DOCREV CUR MEDS BY ELIG CLIN: HCPCS | Performed by: STUDENT IN AN ORGANIZED HEALTH CARE EDUCATION/TRAINING PROGRAM

## 2024-01-02 PROCEDURE — 3075F SYST BP GE 130 - 139MM HG: CPT | Performed by: STUDENT IN AN ORGANIZED HEALTH CARE EDUCATION/TRAINING PROGRAM

## 2024-01-02 RX ORDER — DULAGLUTIDE 3 MG/.5ML
3 INJECTION, SOLUTION SUBCUTANEOUS WEEKLY
Qty: 4 ADJUSTABLE DOSE PRE-FILLED PEN SYRINGE | Refills: 2 | Status: SHIPPED | OUTPATIENT
Start: 2024-01-02

## 2024-01-02 RX ORDER — PEN NEEDLE, DIABETIC 32GX 5/32"
NEEDLE, DISPOSABLE MISCELLANEOUS
COMMUNITY
Start: 2023-12-28

## 2024-01-02 RX ORDER — ATORVASTATIN CALCIUM 20 MG/1
20 TABLET, FILM COATED ORAL DAILY
COMMUNITY
Start: 2023-12-07 | End: 2024-12-06

## 2024-01-02 ASSESSMENT — PATIENT HEALTH QUESTIONNAIRE - PHQ9
5. POOR APPETITE OR OVEREATING: 0
3. TROUBLE FALLING OR STAYING ASLEEP: 0
SUM OF ALL RESPONSES TO PHQ QUESTIONS 1-9: 0
1. LITTLE INTEREST OR PLEASURE IN DOING THINGS: 0
2. FEELING DOWN, DEPRESSED OR HOPELESS: 0
9. THOUGHTS THAT YOU WOULD BE BETTER OFF DEAD, OR OF HURTING YOURSELF: 0
8. MOVING OR SPEAKING SO SLOWLY THAT OTHER PEOPLE COULD HAVE NOTICED. OR THE OPPOSITE, BEING SO FIGETY OR RESTLESS THAT YOU HAVE BEEN MOVING AROUND A LOT MORE THAN USUAL: 0
6. FEELING BAD ABOUT YOURSELF - OR THAT YOU ARE A FAILURE OR HAVE LET YOURSELF OR YOUR FAMILY DOWN: 0
4. FEELING TIRED OR HAVING LITTLE ENERGY: 0
SUM OF ALL RESPONSES TO PHQ QUESTIONS 1-9: 0
SUM OF ALL RESPONSES TO PHQ QUESTIONS 1-9: 0
10. IF YOU CHECKED OFF ANY PROBLEMS, HOW DIFFICULT HAVE THESE PROBLEMS MADE IT FOR YOU TO DO YOUR WORK, TAKE CARE OF THINGS AT HOME, OR GET ALONG WITH OTHER PEOPLE: 0
7. TROUBLE CONCENTRATING ON THINGS, SUCH AS READING THE NEWSPAPER OR WATCHING TELEVISION: 0
SUM OF ALL RESPONSES TO PHQ9 QUESTIONS 1 & 2: 0
SUM OF ALL RESPONSES TO PHQ QUESTIONS 1-9: 0

## 2024-01-02 ASSESSMENT — ENCOUNTER SYMPTOMS
SINUS PRESSURE: 0
SORE THROAT: 0
VOMITING: 0
SHORTNESS OF BREATH: 0
ABDOMINAL PAIN: 0
COUGH: 0

## 2024-01-02 NOTE — PROGRESS NOTES
nutrition label focusing on serving size, calories, fiber, protein and added sugars  - Recommended food logging to learn about food and for accountability  - Discussed controlling the environment - not having \"junk food\" like cookies, chips, ice cream, etc. in the pantry/house  - Discussed myplate, increasing vegetables/fruits, alternative healthy snacking options  - Discussed importance of sleep and stress management for weight loss/maintenance  - Reviewed setting SMART goals  - Discussed mindfulness while eating/snacking, hunger/fullness scale, food as fuel, \"finishing your plate\"    Physical Activity  - Discussed NEAT  - Recommended 150 min per week of moderate-intensity physical activity for weight loss and 300 minutes per week of moderate-intensity PA for weight maintenance  - Discussed slowly increasing physical activity as tolerated with increases in frequency, duration and intensity  - Reviewed importance of 2-3 days per week of weight training for muscle maintenance with weight loss    Weight loss medications  - Discussed options including , , , Plenity, GLP-1s, metformin, orlistat, ,  and - Contraindications: Adipex/Qsymia (hx CAD), Topamax/Contrave/Wellbutrin (already on mood meds, hx bipolar)     8/7  - Start GLP-1, no personal history of pancreatitis and no family history of thyroid cancer, Trulicity sent to pt pharmacy    8/21  - Tolerating Trulicity well, mild nausea, did have two defective pens, last dose 8/17  - Will increase to trulicity 1.5mg weekly, pt's mom will call [employer] and ask for replacement pens    9/18  -Tolerating well, did have an episode of nausea today however likely overate at lunch, increased to Trulicity 3 mg weekly  -Discussed importance of paying close attention to portion sizes with GLP-1's to reduce side effects    10/16  - Doing well on Trulicity 3 mg weekly, has been having some constipation, will increase to Trulicity 4.5 mg weekly  - We will also increase metformin from 750

## 2024-01-08 DIAGNOSIS — E11.9 TYPE 2 DIABETES MELLITUS WITHOUT COMPLICATION, WITHOUT LONG-TERM CURRENT USE OF INSULIN (HCC): ICD-10-CM

## 2024-01-08 RX ORDER — DULAGLUTIDE 3 MG/.5ML
3 INJECTION, SOLUTION SUBCUTANEOUS WEEKLY
Qty: 4 ADJUSTABLE DOSE PRE-FILLED PEN SYRINGE | Refills: 2 | Status: SHIPPED | OUTPATIENT
Start: 2024-01-08

## 2024-01-08 NOTE — TELEPHONE ENCOUNTER
Comments: Please review, new pharmacy.     Last Office Visit (last PCP visit):   1/2/2024    Next Visit Date:  Future Appointments   Date Time Provider Department Center   1/17/2024  3:00 PM Marie Silverio MD [place] [facility]   2/6/2024  3:30 PM Addis Braun DO [facility]   5/7/2024  3:00 PM Melquiades Lofton MD [facility]  NEUR Neurology -       **If hasn't been seen in over a year OR hasn't followed up according to last diabetes/ADHD visit, make appointment for patient before sending refill to provider.    Rx requested:  Requested Prescriptions     Pending Prescriptions Disp Refills    Dulaglutide (TRULICITY) 3 MG/0.5ML SOPN 4 Adjustable Dose Pre-filled Pen Syringe 2     Sig: Inject 3 mg into the skin once a week

## 2024-01-08 NOTE — TELEPHONE ENCOUNTER
Pt mother calling to say that the [facility] pharmacy does not have his trulicity and wants to know if we can send it to the     [facility] in [place] please and thank you patient has been without since the 2nd of this month. Thank you

## 2024-01-30 RX ORDER — VALBENAZINE 80 MG/1
80 CAPSULE ORAL DAILY
Qty: 30 CAPSULE | Refills: 6 | Status: ACTIVE | OUTPATIENT
Start: 2024-01-30 | End: 2024-02-29

## 2024-01-30 NOTE — TELEPHONE ENCOUNTER
requesting medication refill. Please approve or deny this request.    Rx requested:  Requested Prescriptions     Pending Prescriptions Disp Refills    INGREZZA 80 MG CAPS 30 capsule 6     Sig: Take 80 mg by mouth daily         Last Office Visit:   11/3/2023      Next Visit Date:  Future Appointments   Date Time Provider Department Center   2/6/2024  3:30 PM [facility]   2/13/2024  3:00 PM Marie Silverio [facility] [place] [facility]   5/7/2024  3:00 PM Melquiades Lofton MD [facility]  NEUR Neurology -

## 2024-02-06 ENCOUNTER — OFFICE VISIT (OUTPATIENT)
Dept: FAMILY MEDICINE CLINIC | Age: 29
End: 2024-02-06
Payer: MEDICARE

## 2024-02-06 VITALS
HEART RATE: 82 BPM | SYSTOLIC BLOOD PRESSURE: 104 MMHG | BODY MASS INDEX: 52.48 KG/M2 | OXYGEN SATURATION: 97 % | WEIGHT: 315 LBS | HEIGHT: 65 IN | DIASTOLIC BLOOD PRESSURE: 80 MMHG

## 2024-02-06 DIAGNOSIS — I51.9 HEART DISEASE: ICD-10-CM

## 2024-02-06 DIAGNOSIS — F90.1 ATTENTION DEFICIT HYPERACTIVITY DISORDER (ADHD), PREDOMINANTLY HYPERACTIVE TYPE: ICD-10-CM

## 2024-02-06 DIAGNOSIS — E11.9 TYPE 2 DIABETES MELLITUS WITHOUT COMPLICATION, WITHOUT LONG-TERM CURRENT USE OF INSULIN (HCC): Primary | ICD-10-CM

## 2024-02-06 DIAGNOSIS — F31.4 BIPOLAR 1 DISORDER, DEPRESSED, SEVERE (HCC): ICD-10-CM

## 2024-02-06 DIAGNOSIS — F84.0 AUTISTIC DISORDER: ICD-10-CM

## 2024-02-06 DIAGNOSIS — G47.33 OBSTRUCTIVE SLEEP APNEA SYNDROME: ICD-10-CM

## 2024-02-06 DIAGNOSIS — R63.5 ABNORMAL WEIGHT GAIN: ICD-10-CM

## 2024-02-06 PROBLEM — E11.65 TYPE 2 DIABETES MELLITUS WITH HYPERGLYCEMIA (HCC): Status: ACTIVE | Noted: 2024-02-06

## 2024-02-06 PROCEDURE — G8417 CALC BMI ABV UP PARAM F/U: HCPCS | Performed by: STUDENT IN AN ORGANIZED HEALTH CARE EDUCATION/TRAINING PROGRAM

## 2024-02-06 PROCEDURE — 99214 OFFICE O/P EST MOD 30 MIN: CPT | Performed by: STUDENT IN AN ORGANIZED HEALTH CARE EDUCATION/TRAINING PROGRAM

## 2024-02-06 PROCEDURE — G8427 DOCREV CUR MEDS BY ELIG CLIN: HCPCS | Performed by: STUDENT IN AN ORGANIZED HEALTH CARE EDUCATION/TRAINING PROGRAM

## 2024-02-06 PROCEDURE — 3079F DIAST BP 80-89 MM HG: CPT | Performed by: STUDENT IN AN ORGANIZED HEALTH CARE EDUCATION/TRAINING PROGRAM

## 2024-02-06 PROCEDURE — 3074F SYST BP LT 130 MM HG: CPT | Performed by: STUDENT IN AN ORGANIZED HEALTH CARE EDUCATION/TRAINING PROGRAM

## 2024-02-06 PROCEDURE — 2022F DILAT RTA XM EVC RTNOPTHY: CPT | Performed by: STUDENT IN AN ORGANIZED HEALTH CARE EDUCATION/TRAINING PROGRAM

## 2024-02-06 PROCEDURE — 4004F PT TOBACCO SCREEN RCVD TLK: CPT | Performed by: STUDENT IN AN ORGANIZED HEALTH CARE EDUCATION/TRAINING PROGRAM

## 2024-02-06 PROCEDURE — 3046F HEMOGLOBIN A1C LEVEL >9.0%: CPT | Performed by: STUDENT IN AN ORGANIZED HEALTH CARE EDUCATION/TRAINING PROGRAM

## 2024-02-06 PROCEDURE — G8484 FLU IMMUNIZE NO ADMIN: HCPCS | Performed by: STUDENT IN AN ORGANIZED HEALTH CARE EDUCATION/TRAINING PROGRAM

## 2024-02-06 ASSESSMENT — ENCOUNTER SYMPTOMS
ABDOMINAL PAIN: 0
SINUS PRESSURE: 0
VOMITING: 0
SHORTNESS OF BREATH: 0
COUGH: 0
SORE THROAT: 0

## 2024-02-06 NOTE — PROGRESS NOTES
Weight management  Tomer Rivera  YOB: 1995 Age: 28 y.o.  Sex: male  Date of Assessment:  2/6/2024  PCP: Violet Moses  Referred by : Dr. Dumont    Chief Complaint   Patient presents with    Weight Management     Starting waist circumference: 56.5 inches       HPI  Patient is here today with interest in weight loss.     Weight History  How does your weight affect your life and health?  Move around, just an active lifestyle as needed    How confident are you that you will be able to lose weight?   Not Confident          Very Confident   1   2   3   4   5   6   7   8   9   10     When did you first notice that you were gaining weight?   []  Childhood  []  Teens [x]  Adulthood    []  Pregnancy  []  Menopause      How much did you weigh:   1 year ago?      5 years ago?       10 years ago?      Life events associated with weight gain (check all that apply):       []  Marriage  []  Divorce  []  Pregnancy    []  Abuse   [] Illness  []  Travel   []  Injury  []  Nightshift work      []  Job change   []  Quitting smoking []  Alcohol  []  Drugs    [x]  Medication (please list: Ox carbamazepine)      Previous weight-loss programs (check all that apply):       []  Weight Watchers []  Nutrisystem []  Maryann Javed   []  [employer] Weight Loss []  Atkins       []  Evening Shade      []  Zone diet  []  Medifast      []  Dash diet        []  Paleo diet      []  HCG diet     []  Mediterranean diet    []  Ornish diet      []  Keto  []  Other:   What was your maximum weight loss?   What are your greatest challenges with dieting?     Medications  Have you ever taken medication to lose weight? (check all that apply):       [] Phentermine (Adipex) []  Xenecal/Abran []  Phendimetrazine (Bontril)   []  Topamax   []  Saxenda  []  Diethylpropion       []  Bupropion (Wellbutrin)       []  Qsymia      [] Contrave  Other (including supplements):   What worked?   What didn't work?   Why or why not?     Are you interested in

## 2024-02-08 RX ORDER — VALBENAZINE 80 MG/1
80 CAPSULE ORAL DAILY
Qty: 30 CAPSULE | Refills: 6 | Status: SHIPPED | OUTPATIENT
Start: 2024-02-08 | End: 2024-03-09

## 2024-02-12 RX ORDER — VALBENAZINE 80 MG/1
1 CAPSULE ORAL DAILY
Qty: 30 CAPSULE | Refills: 3 | Status: ACTIVE | OUTPATIENT
Start: 2024-02-12

## 2024-02-12 NOTE — TELEPHONE ENCOUNTER
Patient is requesting medication refill. Please approve or deny this request.    Rx requested:  Requested Prescriptions     Pending Prescriptions Disp Refills    INGREZZA 80 MG CAPS [Pharmacy Med Name: INGREZZA 80MG CAPSULES] 30 capsule 3     Sig: TAKE 1 CAPSULE BY MOUTH DAILY         Last Office Visit:   11/3/2023      Next Visit Date:  Future Appointments   Date Time Provider Department Center   2/13/2024  3:00 PM Marie Silverio MD [place] [facility]   5/7/2024  3:00 PM [facility]

## 2024-02-13 ENCOUNTER — OFFICE VISIT (OUTPATIENT)
Dept: PULMONOLOGY | Age: 29
End: 2024-02-13
Payer: MEDICARE

## 2024-02-13 VITALS
WEIGHT: 315 LBS | HEART RATE: 78 BPM | DIASTOLIC BLOOD PRESSURE: 70 MMHG | TEMPERATURE: 97.8 F | HEIGHT: 65 IN | OXYGEN SATURATION: 94 % | BODY MASS INDEX: 52.48 KG/M2 | SYSTOLIC BLOOD PRESSURE: 132 MMHG

## 2024-02-13 DIAGNOSIS — E66.9 OBESITY, UNSPECIFIED CLASSIFICATION, UNSPECIFIED OBESITY TYPE, UNSPECIFIED WHETHER SERIOUS COMORBIDITY PRESENT: ICD-10-CM

## 2024-02-13 DIAGNOSIS — G47.33 OSA ON CPAP: Primary | ICD-10-CM

## 2024-02-13 DIAGNOSIS — G47.10 HYPERSOMNIA: ICD-10-CM

## 2024-02-13 PROCEDURE — 4004F PT TOBACCO SCREEN RCVD TLK: CPT | Performed by: INTERNAL MEDICINE

## 2024-02-13 PROCEDURE — G8484 FLU IMMUNIZE NO ADMIN: HCPCS | Performed by: INTERNAL MEDICINE

## 2024-02-13 PROCEDURE — 99213 OFFICE O/P EST LOW 20 MIN: CPT | Performed by: INTERNAL MEDICINE

## 2024-02-13 PROCEDURE — 3078F DIAST BP <80 MM HG: CPT | Performed by: INTERNAL MEDICINE

## 2024-02-13 PROCEDURE — G8417 CALC BMI ABV UP PARAM F/U: HCPCS | Performed by: INTERNAL MEDICINE

## 2024-02-13 PROCEDURE — G8427 DOCREV CUR MEDS BY ELIG CLIN: HCPCS | Performed by: INTERNAL MEDICINE

## 2024-02-13 PROCEDURE — 3075F SYST BP GE 130 - 139MM HG: CPT | Performed by: INTERNAL MEDICINE

## 2024-02-13 NOTE — PROGRESS NOTES
NEW PATIENT VISIT-PULMONARY/SLEEP    2/13/2024     REFERRING PHYSICIAN:  Violet Moses     REASON FOR REFERRAL: CHRISTOPHER    HPI:     Tomer Rivera is a 28 y.o. male who was referred to sleep clinic for CHRISTOPHER..     Here for second opinion.  Last time he was seen in 2021.  Has been on BiPAP with an inspiratory pressure of 14 and expiratory pressure of 10 cm H2O.  Has not been using the machine for the last 10 months.  Has been having issues with the mask and wants a different mask.  He currently has fullface mask but asking for nasal mask.  Of note, he gained 70 pounds since his sleep study.      He is tired and sleepy during the day and he dozes off easily.    Past Medical History   Past Medical History:   Diagnosis Date    ADHD (attention deficit hyperactivity disorder) 04/04/2012    Allergic rhinitis 04/04/2012    Autism     Hypertension     Obesity (BMI 30-39.9) 04/04/2012    Sleep apnea        Past Surgical History  Past Surgical History:   Procedure Laterality Date    TONSILLECTOMY         Allergies  Allergies   Allergen Reactions    Seasonal        Medications  Current Outpatient Medications   Medication Sig Dispense Refill    INGREZZA 80 MG CAPS TAKE 1 CAPSULE BY MOUTH DAILY 30 capsule 3    Dulaglutide (TRULICITY) 3 MG/0.5ML SOPN Inject 3 mg into the skin once a week 4 Adjustable Dose Pre-filled Pen Syringe 2    BD PEN NEEDLE RUPALI 2ND GEN 32G X 4 MM MISC USE AS DIRECTED WITH LANTUS SOLOSTAR PEN TO INJECT INSULIN EVERY EVENING.      atorvastatin (LIPITOR) 20 MG tablet Take 1 tablet by mouth daily      metoprolol succinate (TOPROL XL) 50 MG extended release tablet Take 1 tablet by mouth daily      OXcarbazepine (TRILEPTAL) 300 MG tablet Take 1 tablet by mouth 2 times daily      ondansetron (ZOFRAN) 4 MG tablet TAKE 1 TABLET BY MOUTH 3 TIMES EVERY DAY AS NEEDED FOR NAUSEA      FreeStyle Lancets MISC USE ONCE DAILY FOR BLOOD SUGAR CHECK      Alcohol Swabs (ALCOHOL PREP) 70 % PADS WASH 1 PACKET BY TOPICAL

## 2024-02-18 ENCOUNTER — HOSPITAL ENCOUNTER (OUTPATIENT)
Dept: SLEEP CENTER | Age: 29
Discharge: HOME | End: 2024-02-20
Payer: MEDICARE

## 2024-02-18 PROCEDURE — 95811 POLYSOM 6/>YRS CPAP 4/> PARM: CPT

## 2024-03-13 ENCOUNTER — TELEPHONE (OUTPATIENT)
Dept: PULMONOLOGY | Age: 29
End: 2024-03-13

## 2024-03-13 NOTE — TELEPHONE ENCOUNTER
Patient's mother called stating patient had sleep study done. They called [facility] regarding setting up his old CPAP machine but states they need a new order for the settings. Please Advise

## 2024-03-26 ENCOUNTER — OFFICE VISIT (OUTPATIENT)
Dept: PULMONOLOGY | Age: 29
End: 2024-03-26
Payer: MEDICARE

## 2024-03-26 VITALS
TEMPERATURE: 97.6 F | WEIGHT: 315 LBS | HEIGHT: 65 IN | DIASTOLIC BLOOD PRESSURE: 80 MMHG | BODY MASS INDEX: 52.48 KG/M2 | SYSTOLIC BLOOD PRESSURE: 142 MMHG | OXYGEN SATURATION: 96 %

## 2024-03-26 DIAGNOSIS — G47.33 OSA ON CPAP: Primary | ICD-10-CM

## 2024-03-26 DIAGNOSIS — E66.9 OBESITY, UNSPECIFIED CLASSIFICATION, UNSPECIFIED OBESITY TYPE, UNSPECIFIED WHETHER SERIOUS COMORBIDITY PRESENT: ICD-10-CM

## 2024-03-26 PROCEDURE — G8417 CALC BMI ABV UP PARAM F/U: HCPCS | Performed by: INTERNAL MEDICINE

## 2024-03-26 PROCEDURE — 4004F PT TOBACCO SCREEN RCVD TLK: CPT | Performed by: INTERNAL MEDICINE

## 2024-03-26 PROCEDURE — 3079F DIAST BP 80-89 MM HG: CPT | Performed by: INTERNAL MEDICINE

## 2024-03-26 PROCEDURE — G8427 DOCREV CUR MEDS BY ELIG CLIN: HCPCS | Performed by: INTERNAL MEDICINE

## 2024-03-26 PROCEDURE — 99213 OFFICE O/P EST LOW 20 MIN: CPT | Performed by: INTERNAL MEDICINE

## 2024-03-26 PROCEDURE — 3077F SYST BP >= 140 MM HG: CPT | Performed by: INTERNAL MEDICINE

## 2024-03-26 PROCEDURE — G8484 FLU IMMUNIZE NO ADMIN: HCPCS | Performed by: INTERNAL MEDICINE

## 2024-03-26 NOTE — PROGRESS NOTES
No acute distress. AAOX3  Head: Normocephalic, without obvious abnormality, atraumatic   Eyes:Pupils bilateral equal and reactive, EOM intact. Normal sclera and conjunctiva   Nose: Mucosa pink  Throat: Clear,  [place] 4  Neck: Supple, No JVD. Nothyromegaly. Neck is thick  Lungs: Clear bilaterally. No wheezing. No crackles. No use of accessory muscles.   Heart: RRR, S1, S2 normal, no murmur, click, rub or gallop   Abdomen: soft, non-tender, nondistended. Bowel sounds normal. No hernia. No organomegaly.   Extremities: extremities normal, atraumatic, no cyanosis, no edema  Skin: Skin color, texture, turgor normal. No rashes or lesions   Neurological: No focal deficits,cranial nerves grossly intact. No weakness. Sensation normal   Psych: Normal Mood  Musculoskeletal: No joint abnormalities.                      No orders of the defined types were placed in this encounter.      Impression:    - Severe obstructive sleep apnea.  Had a retitration study which showed inspiratory pressure of 16 and expiratory pressure of 12 cm H2O is adequate.  - Hypersomnia  - Morbid obesity    Recommendations:     -He will need to supplies to use with his old machine.  His mask is leaking and his hose is likely punctured.  -Will prescribe a new machine for him.  He will receive it in April.  - weight loss and exercise has been advised  - no driving if sleepy or drowsy or impaired   - follow up after being on the new machine for 4 to 6 weeks  -Discussed with the patient and his mother in detail.    Return in about 4 weeks (around 4/23/2024).       Electronically signed by Marie Silverio MD on 3/26/2024 at 3:55 PM

## 2024-04-15 PROBLEM — R21 RASH: Status: ACTIVE | Noted: 2021-04-28

## 2024-04-15 PROBLEM — E78.2 MIXED HYPERLIPIDEMIA: Status: ACTIVE | Noted: 2023-12-07

## 2024-04-15 PROBLEM — Z86.16 HISTORY OF SEVERE ACUTE RESPIRATORY SYNDROME CORONAVIRUS 2 (SARS-COV-2) DISEASE: Status: ACTIVE | Noted: 2023-12-07

## 2024-04-24 DIAGNOSIS — E11.9 TYPE 2 DIABETES MELLITUS WITHOUT COMPLICATION, WITHOUT LONG-TERM CURRENT USE OF INSULIN (HCC): ICD-10-CM

## 2024-04-25 RX ORDER — DULAGLUTIDE 3 MG/.5ML
INJECTION, SOLUTION SUBCUTANEOUS
Qty: 2 ML | Refills: 5 | Status: SHIPPED | OUTPATIENT
Start: 2024-04-25

## 2024-04-25 NOTE — TELEPHONE ENCOUNTER
Comments: Please review, thanks    Last Office Visit (last PCP visit):   2/6/2024    Next Visit Date:  Future Appointments   Date Time Provider Department Center   5/7/2024  3:00 PM Melquiades Lofton MD [facility] NEUR Neurology -   5/23/2024  3:15 PM Marie Silverio MD [place] [facility]   6/5/2024  3:00 PM SCHEDULE, NUTRITIONIST 1 [facility]       **If hasn't been seen in over a year OR hasn't followed up according to last diabetes/ADHD visit, make appointment for patient before sending refill to provider.    Rx requested:  Requested Prescriptions     Pending Prescriptions Disp Refills    TRULICITY 3 MG/0.5ML SOPN [Pharmacy Med Name: TRULICITY 3 MG/0.5 ML PEN]  2     Sig: INJECT 3 MG INTO THE SKIN ONE TIME PER WEEK

## 2024-04-26 ENCOUNTER — TELEPHONE (OUTPATIENT)
Dept: FAMILY MEDICINE CLINIC | Age: 29
End: 2024-04-26

## 2024-04-26 NOTE — TELEPHONE ENCOUNTER
Pt is asking about the refills on the trulicity medication. Please call the pt's mother back at [phone].

## 2024-05-02 DIAGNOSIS — E11.9 TYPE 2 DIABETES MELLITUS WITHOUT COMPLICATION, WITHOUT LONG-TERM CURRENT USE OF INSULIN (HCC): ICD-10-CM

## 2024-05-02 RX ORDER — DULAGLUTIDE 3 MG/.5ML
INJECTION, SOLUTION SUBCUTANEOUS
Qty: 2 ML | Refills: 5 | Status: SHIPPED | OUTPATIENT
Start: 2024-05-02

## 2024-05-31 DIAGNOSIS — E11.9 TYPE 2 DIABETES MELLITUS WITHOUT COMPLICATION, WITHOUT LONG-TERM CURRENT USE OF INSULIN (HCC): ICD-10-CM

## 2024-05-31 RX ORDER — METFORMIN HYDROCHLORIDE 750 MG/1
1500 TABLET, EXTENDED RELEASE ORAL
Qty: 180 TABLET | Refills: 1 | Status: SHIPPED | OUTPATIENT
Start: 2024-05-31 | End: 2024-11-27

## 2024-05-31 NOTE — TELEPHONE ENCOUNTER
Comments: Please review, thanks    Last Office Visit (last PCP visit):   2/6/2024    Next Visit Date:  Future Appointments   Date Time Provider Department Center   6/3/2024 12:00 PM Addis Braun DO [facility]   6/5/2024  3:00 PM SCHEDULE, NUTRITIONIST 1 [facility]   7/1/2024 11:00 AM [facility]io MD [place] [facility]   7/2/2024  3:30 PM Melquiades Lofton MD [facility]  NEUR Neurology -       **If hasn't been seen in over a year OR hasn't followed up according to last diabetes/ADHD visit, make appointment for patient before sending refill to provider.    Rx requested:  Requested Prescriptions     Pending Prescriptions Disp Refills    metFORMIN (GLUCOPHAGE-XR) 750 MG extended release tablet [Pharmacy Med Name: METFORMIN HCL  MG TABLET] 180 tablet 1     Sig: TAKE 2 TABLETS BY MOUTH DAILY (WITH BREAKFAST).

## 2024-06-04 RX ORDER — VALBENAZINE 80 MG/1
1 CAPSULE ORAL DAILY
Qty: 30 CAPSULE | Refills: 3 | Status: ACTIVE | OUTPATIENT
Start: 2024-06-04

## 2024-06-04 NOTE — TELEPHONE ENCOUNTER
Pharmacy is requesting medication refill. Please approve or deny this request.    Rx requested:  Requested Prescriptions     Pending Prescriptions Disp Refills    INGREZZA 80 MG CAPS [Pharmacy Med Name: INGREZZA 80MG CAPSULES] 30 capsule 3     Sig: TAKE 1 CAPSULE BY MOUTH EVERY DAY         Last Office Visit:   11/3/2023      Next Visit Date:  Future Appointments   Date Time Provider Department Center   6/5/2024  3:00 PM SCHEDULE, NUTRITIONIST 1 [facility]   7/1/2024 11:00 AM Marie Silverio MD [place] [facility]   7/2/2024  3:30 PM Melquiades Lofton MD [facility]  NEUR Neurology -        Pt informed with message listed below and understands verbal instructions given.    2 page completed and chart routed to pre-admit to call patient to schedule procedure.    Patient is aware that he will receive a call from pre admit to schedule.

## 2024-07-01 ENCOUNTER — OFFICE VISIT (OUTPATIENT)
Dept: PULMONOLOGY | Age: 29
End: 2024-07-01
Payer: MEDICARE

## 2024-07-01 VITALS
WEIGHT: 315 LBS | OXYGEN SATURATION: 97 % | TEMPERATURE: 98.2 F | DIASTOLIC BLOOD PRESSURE: 80 MMHG | BODY MASS INDEX: 52.48 KG/M2 | SYSTOLIC BLOOD PRESSURE: 138 MMHG | HEART RATE: 88 BPM | HEIGHT: 65 IN

## 2024-07-01 DIAGNOSIS — G47.10 HYPERSOMNIA: ICD-10-CM

## 2024-07-01 DIAGNOSIS — G47.33 OSA ON CPAP: Primary | ICD-10-CM

## 2024-07-01 DIAGNOSIS — E66.9 OBESITY, UNSPECIFIED CLASSIFICATION, UNSPECIFIED OBESITY TYPE, UNSPECIFIED WHETHER SERIOUS COMORBIDITY PRESENT: ICD-10-CM

## 2024-07-01 PROCEDURE — 3075F SYST BP GE 130 - 139MM HG: CPT | Performed by: INTERNAL MEDICINE

## 2024-07-01 PROCEDURE — G8428 CUR MEDS NOT DOCUMENT: HCPCS | Performed by: INTERNAL MEDICINE

## 2024-07-01 PROCEDURE — G8417 CALC BMI ABV UP PARAM F/U: HCPCS | Performed by: INTERNAL MEDICINE

## 2024-07-01 PROCEDURE — 3079F DIAST BP 80-89 MM HG: CPT | Performed by: INTERNAL MEDICINE

## 2024-07-01 PROCEDURE — 99213 OFFICE O/P EST LOW 20 MIN: CPT | Performed by: INTERNAL MEDICINE

## 2024-07-01 PROCEDURE — 4004F PT TOBACCO SCREEN RCVD TLK: CPT | Performed by: INTERNAL MEDICINE

## 2024-07-01 NOTE — PROGRESS NOTES
PATIENT VISIT-PULMONARY/SLEEP    7/1/2024     REFERRING PHYSICIAN:  Violet Moses APRN - NP     REASON FOR REFERRAL: CHRISTOPHER    HPI:     Tomer Rivera is a 28 y.o. male who was referred to sleep clinic for CHRISTOPHER..     Here for second opinion.  Last time he was seen in 2021.  Has been on BiPAP with an inspiratory pressure of 14 and expiratory pressure of 10 cm H2O.  Has not been using the machine for the last 10 months.  Has been having issues with the mask and wants a different mask.  He currently has fullface mask but asking for nasal mask.  Of note, he gained 70 pounds since his sleep study.      He is tired and sleepy during the day and he dozes off easily.      3/26/24:    Comes back for follow-up.  Underwent BiPAP titration study.  BiPAP with an inspiratory pressure of 16 and expiratory pressure of 12 was adequate.  Did well during the test.  His sleep efficiency was good.  Still trying to use his old machine, however his hose is leaking and his mask is leaking.  He is set up to receive the new machine in April.        7/1/24:    Received BiPAP machine.  Patient has been compliant very well.  Inspiratory pressure 16 expiratory pressure 12 cm H2O.  Averaging less than 2 hours.  Has a high leak.  He tells me that he has a lot of work to do with his kids and that \" his wife usually can well to take care of the kids\".  He feels better when he uses the machine couple hours.      Past Medical History   Past Medical History:   Diagnosis Date    ADHD (attention deficit hyperactivity disorder) 04/04/2012    Allergic rhinitis 04/04/2012    Autism     Hypertension     Mixed hyperlipidemia 12/7/2023    Obesity (BMI 30-39.9) 04/04/2012    Sleep apnea        Past Surgical History  Past Surgical History:   Procedure Laterality Date    TONSILLECTOMY         Allergies  Allergies   Allergen Reactions    Seasonal        Medications  Current Outpatient Medications   Medication Sig Dispense Refill    INGREZZA 80 MG CAPS

## 2024-07-02 ENCOUNTER — OFFICE VISIT (OUTPATIENT)
Dept: NEUROLOGY | Age: 29
End: 2024-07-02
Payer: MEDICARE

## 2024-07-02 ENCOUNTER — PATIENT MESSAGE (OUTPATIENT)
Dept: NEUROLOGY | Age: 29
End: 2024-07-02

## 2024-07-02 VITALS
WEIGHT: 315 LBS | DIASTOLIC BLOOD PRESSURE: 70 MMHG | HEART RATE: 95 BPM | BODY MASS INDEX: 63.24 KG/M2 | SYSTOLIC BLOOD PRESSURE: 130 MMHG

## 2024-07-02 DIAGNOSIS — G24.01 DRUG-INDUCED SUBACUTE DYSKINESIA: Primary | ICD-10-CM

## 2024-07-02 DIAGNOSIS — R74.8 ABNORMAL CPK: ICD-10-CM

## 2024-07-02 DIAGNOSIS — G47.33 OSA (OBSTRUCTIVE SLEEP APNEA): ICD-10-CM

## 2024-07-02 PROCEDURE — 99214 OFFICE O/P EST MOD 30 MIN: CPT | Performed by: PSYCHIATRY & NEUROLOGY

## 2024-07-02 PROCEDURE — 3078F DIAST BP <80 MM HG: CPT | Performed by: PSYCHIATRY & NEUROLOGY

## 2024-07-02 PROCEDURE — 3075F SYST BP GE 130 - 139MM HG: CPT | Performed by: PSYCHIATRY & NEUROLOGY

## 2024-07-02 PROCEDURE — G8427 DOCREV CUR MEDS BY ELIG CLIN: HCPCS | Performed by: PSYCHIATRY & NEUROLOGY

## 2024-07-02 PROCEDURE — 4004F PT TOBACCO SCREEN RCVD TLK: CPT | Performed by: PSYCHIATRY & NEUROLOGY

## 2024-07-02 PROCEDURE — G8417 CALC BMI ABV UP PARAM F/U: HCPCS | Performed by: PSYCHIATRY & NEUROLOGY

## 2024-07-02 RX ORDER — VALBENAZINE 80 MG/1
1 CAPSULE ORAL DAILY
Qty: 30 CAPSULE | Refills: 3 | Status: ACTIVE | OUTPATIENT
Start: 2024-07-02

## 2024-07-02 ASSESSMENT — ENCOUNTER SYMPTOMS
VOMITING: 0
COLOR CHANGE: 0
BACK PAIN: 0
PHOTOPHOBIA: 0
TROUBLE SWALLOWING: 0
NAUSEA: 0
SHORTNESS OF BREATH: 0
CHOKING: 0

## 2024-07-02 NOTE — PROGRESS NOTES
Subjective:      Patient ID: Tomer Rivera is a 28 y.o. male who presents today for:  Chief Complaint   Patient presents with    6 Month Follow-Up     Pt states things are great. No questions or concerns at this time        HPI 28-year-old right-handed gentleman with drug-induced dyskinesias.  Patient was started on Ingrezza and has done very well.  The patient has not any side effects.  Patient has sleep apnea but does not use his CPAP machine.  He actually has not any major issues in the last we obtain blood tests and his CPK levels have always been in the 100s which is not uncommon with -American males.  This has not gone more than 600 and mostly remained in the range of 400-500.  Liver function tests are normal.  Thyroid function test are normal as well.  This therefore is an idiopathic CPkemia  Patient reports he had 1 fall though this was mechanical.  He now is using his CPAP machine.  No movements are noted.    Past Medical History:   Diagnosis Date    ADHD (attention deficit hyperactivity disorder) 2012    Allergic rhinitis 2012    Autism     Hypertension     Mixed hyperlipidemia 2023    Obesity (BMI 30-39.9) 2012    Sleep apnea      Past Surgical History:   Procedure Laterality Date    TONSILLECTOMY       Social History     Socioeconomic History    Marital status: Single     Spouse name: Not on file    Number of children: Not on file    Years of education: Not on file    Highest education level: Not on file   Occupational History    Not on file   Tobacco Use    Smoking status: Some Days     Current packs/day: 0.00     Average packs/day: 0.5 packs/day for 7.0 years (3.5 ttl pk-yrs)     Types: Cigarettes, Cigars     Start date: 2013     Last attempt to quit: 2018     Years since quittin.0    Smokeless tobacco: Never   Vaping Use    Vaping Use: Never used   Substance and Sexual Activity    Alcohol use: Not Currently     Comment: occ    Drug use: No    Sexual

## 2024-08-07 ENCOUNTER — TELEPHONE (OUTPATIENT)
Dept: FAMILY MEDICINE CLINIC | Age: 29
End: 2024-08-07

## 2024-08-19 ENCOUNTER — TELEPHONE (OUTPATIENT)
Dept: FAMILY MEDICINE CLINIC | Age: 29
End: 2024-08-19

## 2024-08-19 NOTE — TELEPHONE ENCOUNTER
----- Message from Titi FRANCE sent at 8/19/2024  1:10 PM EDT -----  Regarding: ECC Message to Provider  ECC Message to Provider    Relationship to Patient: Guardian Mother     Additional Information Caller is asking if the weight management Doctor is already available //please update the caller so that they can book an appointment  --------------------------------------------------------------------------------------------------------------------------    Call Back Information: OK to leave message on voicemail  Preferred Call Back Number: Phone [phone]

## 2024-08-19 NOTE — TELEPHONE ENCOUNTER
Advised pt mother that there is no replacement at this time, possible referral to Dr. Dumont if they would like. Pt mother declined at this time.

## 2024-09-30 RX ORDER — VALBENAZINE 80 MG/1
1 CAPSULE ORAL DAILY
Qty: 30 CAPSULE | Refills: 3 | Status: ACTIVE | OUTPATIENT
Start: 2024-09-30

## 2024-09-30 NOTE — TELEPHONE ENCOUNTER
Pharmacy is  requesting medication refill. Please approve or deny this request.    Rx requested:  Requested Prescriptions     Pending Prescriptions Disp Refills    INGREZZA 80 MG CAPS [Pharmacy Med Name: INGREZZA 80MG CAPSULES] 30 capsule 3     Sig: TAKE 1 CAPSULE BY MOUTH DAILY         Last Office Visit:   7/2/2024      Next Visit Date:  Future Appointments   Date Time Provider Department Center   10/30/2024  3:00 PM Melquiades Lofton MD [place] NEUR[facility] Neurology -

## 2024-10-30 ENCOUNTER — OFFICE VISIT (OUTPATIENT)
Dept: NEUROLOGY | Age: 29
End: 2024-10-30
Payer: MEDICARE

## 2024-10-30 VITALS
DIASTOLIC BLOOD PRESSURE: 70 MMHG | HEART RATE: 95 BPM | BODY MASS INDEX: 64.73 KG/M2 | WEIGHT: 315 LBS | SYSTOLIC BLOOD PRESSURE: 120 MMHG

## 2024-10-30 DIAGNOSIS — F31.70 BIPOLAR DISORDER IN FULL REMISSION, MOST RECENT EPISODE UNSPECIFIED TYPE (HCC): ICD-10-CM

## 2024-10-30 DIAGNOSIS — M62.82 NON-TRAUMATIC RHABDOMYOLYSIS: ICD-10-CM

## 2024-10-30 DIAGNOSIS — G24.9 DYSKINESIA: ICD-10-CM

## 2024-10-30 DIAGNOSIS — R74.8 ABNORMAL CPK: ICD-10-CM

## 2024-10-30 DIAGNOSIS — R63.5 WEIGHT GAIN: ICD-10-CM

## 2024-10-30 DIAGNOSIS — G24.01 DRUG-INDUCED SUBACUTE DYSKINESIA: Primary | ICD-10-CM

## 2024-10-30 PROCEDURE — 99214 OFFICE O/P EST MOD 30 MIN: CPT | Performed by: PSYCHIATRY & NEUROLOGY

## 2024-10-30 PROCEDURE — G8417 CALC BMI ABV UP PARAM F/U: HCPCS | Performed by: PSYCHIATRY & NEUROLOGY

## 2024-10-30 PROCEDURE — 4004F PT TOBACCO SCREEN RCVD TLK: CPT | Performed by: PSYCHIATRY & NEUROLOGY

## 2024-10-30 PROCEDURE — G8484 FLU IMMUNIZE NO ADMIN: HCPCS | Performed by: PSYCHIATRY & NEUROLOGY

## 2024-10-30 PROCEDURE — G8427 DOCREV CUR MEDS BY ELIG CLIN: HCPCS | Performed by: PSYCHIATRY & NEUROLOGY

## 2024-10-30 PROCEDURE — 3078F DIAST BP <80 MM HG: CPT | Performed by: PSYCHIATRY & NEUROLOGY

## 2024-10-30 PROCEDURE — 3074F SYST BP LT 130 MM HG: CPT | Performed by: PSYCHIATRY & NEUROLOGY

## 2024-10-30 RX ORDER — VALBENAZINE 80 MG/1
1 CAPSULE ORAL DAILY
Qty: 30 CAPSULE | Refills: 3 | Status: ACTIVE | OUTPATIENT
Start: 2024-10-30

## 2024-10-30 NOTE — PROGRESS NOTES
tablet by mouth daily as needed.      allopurinol (ZYLOPRIM) 100 MG tablet Take 1 tablet by mouth daily      cetirizine (ZYRTEC) 10 MG tablet Take 1 tablet by mouth daily 30 tablet 0    amLODIPine (NORVASC) 10 MG tablet TAKE 1 TABLET BY MOUTH EVERY DAY 90 tablet 1    hydroCHLOROthiazide (HYDRODIURIL) 25 MG tablet TAKE 1 TABLET BY MOUTH EVERY MORNING 90 tablet 1     No current facility-administered medications for this visit.         Review of Systems   Constitutional:  Negative for fever.   HENT:  Negative for ear pain, tinnitus and trouble swallowing.    Eyes:  Negative for photophobia and visual disturbance.   Respiratory:  Negative for choking and shortness of breath.    Cardiovascular:  Negative for chest pain and palpitations.   Gastrointestinal:  Negative for nausea and vomiting.   Musculoskeletal:  Negative for back pain, gait problem, joint swelling, myalgias, neck pain and neck stiffness.   Skin:  Negative for color change.   Allergic/Immunologic: Negative for food allergies.   Neurological:  Negative for dizziness, tremors, seizures, syncope, facial asymmetry, speech difficulty, weakness, light-headedness, numbness and headaches.   Psychiatric/Behavioral:  Negative for behavioral problems, confusion, hallucinations and sleep disturbance.        Objective:   /70 (Site: Left Upper Arm, Position: Sitting, Cuff Size: Medium Adult)   Pulse 95   Wt (!) 176.4 kg (389 lb)   BMI 64.73 kg/m²     Physical Exam  Vitals reviewed.   Eyes:      Pupils: Pupils are equal, round, and reactive to light.   Cardiovascular:      Rate and Rhythm: Normal rate and regular rhythm.      Heart sounds: No murmur heard.  Pulmonary:      Effort: Pulmonary effort is normal.      Breath sounds: Normal breath sounds.   Musculoskeletal:         General: Normal range of motion.      Cervical back: Normal range of motion.   Skin:     General: Skin is warm.   Neurological:      Mental Status: He is alert and oriented to person, place,

## 2024-12-05 ENCOUNTER — APPOINTMENT (OUTPATIENT)
Dept: CARDIOLOGY | Facility: CLINIC | Age: 29
End: 2024-12-05
Payer: MEDICARE

## 2024-12-07 ENCOUNTER — HOSPITAL ENCOUNTER (INPATIENT)
Age: 29
LOS: 4 days | Discharge: HOME | DRG: 885 | End: 2024-12-12
Attending: EMERGENCY MEDICINE | Admitting: PSYCHIATRY & NEUROLOGY
Payer: MEDICARE

## 2024-12-07 DIAGNOSIS — F25.9 SCHIZOAFFECTIVE DISORDER, UNSPECIFIED TYPE (HCC): ICD-10-CM

## 2024-12-07 DIAGNOSIS — R44.3 HALLUCINATIONS: ICD-10-CM

## 2024-12-07 DIAGNOSIS — F22 DELUSIONS (HCC): ICD-10-CM

## 2024-12-07 DIAGNOSIS — R45.850 HOMICIDAL IDEATIONS: Primary | ICD-10-CM

## 2024-12-07 LAB
ALBUMIN SERPL-MCNC: 4.5 G/DL (ref 3.5–4.6)
ALP SERPL-CCNC: 74 U/L (ref 35–104)
ALT SERPL-CCNC: 32 U/L (ref 0–41)
AMPHET UR QL SCN: NORMAL
ANION GAP SERPL CALCULATED.3IONS-SCNC: 9 MEQ/L (ref 9–15)
APAP SERPL-MCNC: <5 UG/ML (ref 10–30)
AST SERPL-CCNC: 30 U/L (ref 0–40)
BACTERIA URNS QL MICRO: NEGATIVE /HPF
BARBITURATES UR QL SCN: NORMAL
BASOPHILS # BLD: 0.1 K/UL (ref 0–0.2)
BASOPHILS NFR BLD: 1 %
BENZODIAZ UR QL SCN: NORMAL
BILIRUB SERPL-MCNC: 0.8 MG/DL (ref 0.2–0.7)
BILIRUB UR QL STRIP: NEGATIVE
BUN SERPL-MCNC: 12 MG/DL (ref 6–20)
CALCIUM SERPL-MCNC: 9.2 MG/DL (ref 8.5–9.9)
CANNABINOIDS UR QL SCN: NORMAL
CHLORIDE SERPL-SCNC: 102 MEQ/L (ref 95–107)
CK SERPL-CCNC: 646 U/L (ref 0–190)
CLARITY UR: CLEAR
CO2 SERPL-SCNC: 26 MEQ/L (ref 20–31)
COCAINE UR QL SCN: NORMAL
COLOR UR: YELLOW
CREAT SERPL-MCNC: 0.79 MG/DL (ref 0.7–1.2)
DRUG SCREEN COMMENT UR-IMP: NORMAL
EOSINOPHIL # BLD: 0.3 K/UL (ref 0–0.7)
EOSINOPHIL NFR BLD: 4.8 %
EPI CELLS #/AREA URNS AUTO: NORMAL /HPF (ref 0–5)
ERYTHROCYTE [DISTWIDTH] IN BLOOD BY AUTOMATED COUNT: 12.7 % (ref 11.5–14.5)
ETHANOL PERCENT: NORMAL G/DL
ETHANOLAMINE SERPL-MCNC: <10 MG/DL (ref 0–0.08)
FENTANYL SCREEN, URINE: NORMAL
GLOBULIN SER CALC-MCNC: 3.1 G/DL (ref 2.3–3.5)
GLUCOSE SERPL-MCNC: 135 MG/DL (ref 70–99)
GLUCOSE UR STRIP-MCNC: NEGATIVE MG/DL
HCT VFR BLD AUTO: 42.7 % (ref 42–52)
HGB BLD-MCNC: 14.8 G/DL (ref 14–18)
HGB UR QL STRIP: NEGATIVE
HYALINE CASTS #/AREA URNS AUTO: NORMAL /HPF (ref 0–5)
KETONES UR STRIP-MCNC: ABNORMAL MG/DL
LEUKOCYTE ESTERASE UR QL STRIP: NEGATIVE
LYMPHOCYTES # BLD: 2.2 K/UL (ref 1–4.8)
LYMPHOCYTES NFR BLD: 35.5 %
MCH RBC QN AUTO: 29.1 PG (ref 27–31.3)
MCHC RBC AUTO-ENTMCNC: 34.7 % (ref 33–37)
MCV RBC AUTO: 83.9 FL (ref 79–92.2)
METHADONE UR QL SCN: NORMAL
MONOCYTES # BLD: 0.8 K/UL (ref 0.2–0.8)
MONOCYTES NFR BLD: 12.4 %
NEUTROPHILS # BLD: 2.8 K/UL (ref 1.4–6.5)
NEUTS SEG NFR BLD: 45.5 %
NITRITE UR QL STRIP: NEGATIVE
OPIATES UR QL SCN: NORMAL
OXYCODONE UR QL SCN: NORMAL
PCP UR QL SCN: NORMAL
PH UR STRIP: 5.5 [PH] (ref 5–9)
PLATELET # BLD AUTO: 259 K/UL (ref 130–400)
POTASSIUM SERPL-SCNC: 4.2 MEQ/L (ref 3.4–4.9)
PROPOXYPH UR QL SCN: NORMAL
PROT SERPL-MCNC: 7.6 G/DL (ref 6.3–8)
PROT UR STRIP-MCNC: >=300 MG/DL
RBC # BLD AUTO: 5.09 M/UL (ref 4.7–6.1)
RBC #/AREA URNS AUTO: NORMAL /HPF (ref 0–5)
SALICYLATES SERPL-MCNC: <0.3 MG/DL (ref 15–30)
SODIUM SERPL-SCNC: 137 MEQ/L (ref 135–144)
SP GR UR STRIP: 1.02 (ref 1–1.03)
TSH SERPL-MCNC: 1.42 UIU/ML (ref 0.44–3.86)
UROBILINOGEN UR STRIP-ACNC: 1 E.U./DL
WBC # BLD AUTO: 6.1 K/UL (ref 4.8–10.8)
WBC #/AREA URNS AUTO: NORMAL /HPF (ref 0–5)

## 2024-12-07 PROCEDURE — 80179 DRUG ASSAY SALICYLATE: CPT

## 2024-12-07 PROCEDURE — 80143 DRUG ASSAY ACETAMINOPHEN: CPT

## 2024-12-07 PROCEDURE — 82077 ASSAY SPEC XCP UR&BREATH IA: CPT

## 2024-12-07 PROCEDURE — 80307 DRUG TEST PRSMV CHEM ANLYZR: CPT

## 2024-12-07 PROCEDURE — 99285 EMERGENCY DEPT VISIT HI MDM: CPT

## 2024-12-07 PROCEDURE — 84443 ASSAY THYROID STIM HORMONE: CPT

## 2024-12-07 PROCEDURE — 85025 COMPLETE CBC W/AUTO DIFF WBC: CPT

## 2024-12-07 PROCEDURE — 81001 URINALYSIS AUTO W/SCOPE: CPT

## 2024-12-07 PROCEDURE — 82550 ASSAY OF CK (CPK): CPT

## 2024-12-07 PROCEDURE — 80053 COMPREHEN METABOLIC PANEL: CPT

## 2024-12-07 PROCEDURE — 96372 THER/PROPH/DIAG INJ SC/IM: CPT

## 2024-12-07 PROCEDURE — 6360000002 HC RX W HCPCS: Performed by: EMERGENCY MEDICINE

## 2024-12-07 PROCEDURE — 36415 COLL VENOUS BLD VENIPUNCTURE: CPT

## 2024-12-07 RX ORDER — HALOPERIDOL 5 MG/ML
10 INJECTION INTRAMUSCULAR ONCE
Status: COMPLETED | OUTPATIENT
Start: 2024-12-07 | End: 2024-12-07

## 2024-12-07 RX ADMIN — HALOPERIDOL LACTATE 10 MG: 5 INJECTION, SOLUTION INTRAMUSCULAR at 17:43

## 2024-12-07 ASSESSMENT — PAIN - FUNCTIONAL ASSESSMENT: PAIN_FUNCTIONAL_ASSESSMENT: NONE - DENIES PAIN

## 2024-12-07 ASSESSMENT — LIFESTYLE VARIABLES
HOW OFTEN DO YOU HAVE A DRINK CONTAINING ALCOHOL: NEVER
HOW MANY STANDARD DRINKS CONTAINING ALCOHOL DO YOU HAVE ON A TYPICAL DAY: PATIENT DOES NOT DRINK

## 2024-12-08 PROBLEM — F25.0 SCHIZOAFFECTIVE DISORDER, BIPOLAR TYPE (HCC): Status: ACTIVE | Noted: 2024-12-08

## 2024-12-08 LAB
GLUCOSE BLD-MCNC: 142 MG/DL (ref 70–99)
PERFORMED ON: ABNORMAL

## 2024-12-08 PROCEDURE — 6370000000 HC RX 637 (ALT 250 FOR IP): Performed by: INTERNAL MEDICINE

## 2024-12-08 PROCEDURE — 1240000000 HC EMOTIONAL WELLNESS R&B

## 2024-12-08 PROCEDURE — 93005 ELECTROCARDIOGRAM TRACING: CPT

## 2024-12-08 PROCEDURE — 6370000000 HC RX 637 (ALT 250 FOR IP): Performed by: PSYCHIATRY & NEUROLOGY

## 2024-12-08 RX ORDER — MAGNESIUM HYDROXIDE/ALUMINUM HYDROXICE/SIMETHICONE 120; 1200; 1200 MG/30ML; MG/30ML; MG/30ML
30 SUSPENSION ORAL PRN
Status: DISCONTINUED | OUTPATIENT
Start: 2024-12-08 | End: 2024-12-12 | Stop reason: HOSPADM

## 2024-12-08 RX ORDER — ATORVASTATIN CALCIUM 20 MG/1
20 TABLET, FILM COATED ORAL DAILY
Status: DISCONTINUED | OUTPATIENT
Start: 2024-12-08 | End: 2024-12-12 | Stop reason: HOSPADM

## 2024-12-08 RX ORDER — HYDROXYZINE HYDROCHLORIDE 50 MG/ML
50 INJECTION, SOLUTION INTRAMUSCULAR EVERY 6 HOURS PRN
Status: DISCONTINUED | OUTPATIENT
Start: 2024-12-08 | End: 2024-12-12 | Stop reason: HOSPADM

## 2024-12-08 RX ORDER — HALOPERIDOL 5 MG/1
5 TABLET ORAL EVERY 6 HOURS PRN
Status: DISCONTINUED | OUTPATIENT
Start: 2024-12-08 | End: 2024-12-12 | Stop reason: HOSPADM

## 2024-12-08 RX ORDER — ACETAMINOPHEN 325 MG/1
650 TABLET ORAL EVERY 4 HOURS PRN
Status: DISCONTINUED | OUTPATIENT
Start: 2024-12-08 | End: 2024-12-12 | Stop reason: HOSPADM

## 2024-12-08 RX ORDER — LOSARTAN POTASSIUM 50 MG/1
100 TABLET ORAL DAILY
Status: DISCONTINUED | OUTPATIENT
Start: 2024-12-08 | End: 2024-12-12 | Stop reason: HOSPADM

## 2024-12-08 RX ORDER — BENZTROPINE MESYLATE 1 MG/ML
2 INJECTION, SOLUTION INTRAMUSCULAR; INTRAVENOUS 2 TIMES DAILY PRN
Status: DISCONTINUED | OUTPATIENT
Start: 2024-12-08 | End: 2024-12-12 | Stop reason: HOSPADM

## 2024-12-08 RX ORDER — INSULIN LISPRO 100 [IU]/ML
0-8 INJECTION, SOLUTION INTRAVENOUS; SUBCUTANEOUS
Status: DISCONTINUED | OUTPATIENT
Start: 2024-12-08 | End: 2024-12-12 | Stop reason: HOSPADM

## 2024-12-08 RX ORDER — TRAZODONE HYDROCHLORIDE 50 MG/1
50 TABLET, FILM COATED ORAL NIGHTLY PRN
Status: DISCONTINUED | OUTPATIENT
Start: 2024-12-08 | End: 2024-12-12 | Stop reason: HOSPADM

## 2024-12-08 RX ORDER — OXCARBAZEPINE 150 MG/1
150 TABLET, FILM COATED ORAL 2 TIMES DAILY
Status: DISCONTINUED | OUTPATIENT
Start: 2024-12-08 | End: 2024-12-12 | Stop reason: HOSPADM

## 2024-12-08 RX ORDER — HYDROXYZINE PAMOATE 50 MG/1
50 CAPSULE ORAL EVERY 6 HOURS PRN
Status: DISCONTINUED | OUTPATIENT
Start: 2024-12-08 | End: 2024-12-12 | Stop reason: HOSPADM

## 2024-12-08 RX ORDER — HALOPERIDOL 5 MG/ML
5 INJECTION INTRAMUSCULAR EVERY 6 HOURS PRN
Status: DISCONTINUED | OUTPATIENT
Start: 2024-12-08 | End: 2024-12-12 | Stop reason: HOSPADM

## 2024-12-08 RX ORDER — METOPROLOL SUCCINATE 50 MG/1
50 TABLET, EXTENDED RELEASE ORAL DAILY
Status: DISCONTINUED | OUTPATIENT
Start: 2024-12-08 | End: 2024-12-11

## 2024-12-08 RX ADMIN — ATORVASTATIN CALCIUM 20 MG: 20 TABLET, FILM COATED ORAL at 20:49

## 2024-12-08 RX ADMIN — METOPROLOL SUCCINATE 50 MG: 50 TABLET, EXTENDED RELEASE ORAL at 20:48

## 2024-12-08 RX ADMIN — OXCARBAZEPINE 150 MG: 150 TABLET, FILM COATED ORAL at 20:49

## 2024-12-08 RX ADMIN — LOSARTAN POTASSIUM 100 MG: 50 TABLET, FILM COATED ORAL at 20:47

## 2024-12-08 RX ADMIN — TRAZODONE HYDROCHLORIDE 50 MG: 50 TABLET ORAL at 20:49

## 2024-12-08 RX ADMIN — ACETAMINOPHEN 650 MG: 325 TABLET ORAL at 18:26

## 2024-12-08 RX ADMIN — OXCARBAZEPINE 150 MG: 150 TABLET, FILM COATED ORAL at 13:33

## 2024-12-08 ASSESSMENT — PATIENT HEALTH QUESTIONNAIRE - PHQ9
SUM OF ALL RESPONSES TO PHQ QUESTIONS 1-9: 0
SUM OF ALL RESPONSES TO PHQ QUESTIONS 1-9: 0
SUM OF ALL RESPONSES TO PHQ9 QUESTIONS 1 & 2: 0
2. FEELING DOWN, DEPRESSED OR HOPELESS: NOT AT ALL
1. LITTLE INTEREST OR PLEASURE IN DOING THINGS: NOT AT ALL
SUM OF ALL RESPONSES TO PHQ QUESTIONS 1-9: 0
SUM OF ALL RESPONSES TO PHQ QUESTIONS 1-9: 0

## 2024-12-08 ASSESSMENT — PAIN SCALES - GENERAL
PAINLEVEL_OUTOF10: 3
PAINLEVEL_OUTOF10: 0

## 2024-12-08 ASSESSMENT — PAIN DESCRIPTION - ORIENTATION: ORIENTATION: RIGHT

## 2024-12-08 ASSESSMENT — PAIN DESCRIPTION - LOCATION: LOCATION: MOUTH

## 2024-12-08 ASSESSMENT — SLEEP AND FATIGUE QUESTIONNAIRES
AVERAGE NUMBER OF SLEEP HOURS: 10
DO YOU HAVE DIFFICULTY SLEEPING: NO
DO YOU USE A SLEEP AID: NO

## 2024-12-08 ASSESSMENT — PAIN DESCRIPTION - DESCRIPTORS: DESCRIPTORS: ACHING

## 2024-12-08 ASSESSMENT — PAIN SCALES - WONG BAKER: WONGBAKER_NUMERICALRESPONSE: NO HURT

## 2024-12-08 NOTE — ED NOTES
Tomer did give permission for his mother, Isabel at [phone], to have his HIPAA code. Isabel was provided this number.     Isabel did give collateral information. She stated that earlier yesterday there was an argument at the house, but she states that Tomer after the argument just stated that he did not feel well and that is why she had brought him to the hospital. Tomer never mentioned to his mother that he was hearing voices or wanting to hurt himself or anyone else. Isabel stated that Tomer is autistic and does say things that she feels he does not understand the consequences of and that he says things to sometimes get attention. She stated, \"he doesn't grasp the concept.\"     Isabel stated that since 2018 Tomer has been following up at [facility] in [place], [place] for his mental health issues. He sees ZABRINA Fernandez. Isabel stated that he has been on Trileptal for the past few years and that he does take his medications regularly except he did not take his medication yesterday. Isabel stated that there is no history of violence and there are no weapons in the home.

## 2024-12-08 NOTE — ED NOTES
Pt remains asleep with even respirations no problems and no C/O any kind expressed.  Pt is awaiting the doctor to call back for the additional information obtained from his family for his disposition

## 2024-12-08 NOTE — ED NOTES
Permission received from Dr. Real to allow patients concerned mother to come visit patient at bedside for short time.  Mother brought to bedside and related to this nurse that patient is autistic and that she wanted to make sure we were aware of this.  Informed her that we are aware.

## 2024-12-08 NOTE — PROGRESS NOTES
1205 Received report from MAC Chavis.  1224 Pt arrived to floor via wheelchair. Pt ambulated with strong steady gait to room. Dual skin check and contraband check done with MAC Chavis. Pt with hard callouses to bottom of left foot. Contraband check negative.

## 2024-12-08 NOTE — ED NOTES
Provisional Diagnosis:   HI, SI and hallucinations.    Psychosocial and Contextual Factors:   Patient states he has lived in [place] for many years. He lives with his mother, father and grandmother. He states his \"baby duane lives right around the corner.\" Patient states he has two boys, 2 year-old and 1 year-old. He states he is employed full-time as a .  Patient does have a history of autism and history of ADHD.    C-SSRS Summary:  1) Within the past month, have you wished you were dead or wished you could go to sleep and not wake up? : Yes2) Have you actually had any thoughts of killing yourself? : Yes3) Have you been thinking about how you might kill yourself? : Yes4) Have you had these thoughts and had some intention of acting on them? : Yes5) Have you started to work out or worked out the details of how to kill yourself? Do you intend to carry out this plan? : Yes6) Have you ever done anything, started to do anything, or prepared to do anything to end your life?: NoRisk of Suicide: High Risk     Patient: Patient was sleeping, but easily awakened. Patient has pressured speech, loud speech. At time he is hard to understand.   Family: Family members are not available, but mother did leave her number earlier in the evening, Isabel, [phone].  Agency: Patient denies any psychiatric history, but from previous chart review, patient does have a history of bipolar disorder.    Substance Abuse: Patient denies drug use. He states \"I smoke a couple black and mild every now and then. I also drink a malt liquor every now and then, but not too often.\"    Present Suicidal Behavior:      Verbal/attempt:  On arrival to the ER the patient did state he was suicidal and that he thoughts of killing himself, but on interviewing the patient he stated he is not suicidal and \"I have never tried to hurt myself.\" He went on to state, \"I have never worked out a plan.\" He stated, \"I have thoughts, but I stop myself.\"         Past

## 2024-12-08 NOTE — ED NOTES
Patient resting in room on cart.  Patient is awake, alert and oriented.  Patient relates that he is \"ready to go home.\"  Explained to patient that he needs to go to [place] area for psychiatric evaluation/assessment due to statements he made earlier during day about stabbing himself.  Patient voices understanding.

## 2024-12-08 NOTE — ED NOTES
Patient continues to rest quietly in room on cart.  No acute distress observed.  Patient does have slight dyspnea with exertion and slightly labored breathing off/on.  1:1 observation continues.  Behavior is calm and cooperative.  Patient requested food/drink.  Milk, pretzels, sandwich, pudding give.  Patient appetite good.

## 2024-12-08 NOTE — CARE COORDINATION
Psychosocial Assessment     Admission Reason: SI with plan to stab self    C-SSRS Lifetime Recent Completed - Current Suicide Risk:   [] No Risk  [] Low [] Moderate [x] High     Risk Factors: past hospitalizations, impulsivity, developmental disability, reported violent behavior in past, education    Protective Factors: spirituality, family is supportive, has open services with Marie, reports medication compliance, employed      Gender:  [x] Male [] Female [] Transgender  [] Other    Sexual Orientation:  [x] Heterosexual [] Homosexual [] Bisexual [] Other    Current or Past Mental Health and/or Addictions Treatment (and response to treatment):  [x] Yes, When and Where: reports hospitalizations in the past- unsure of past dates. EMR does not date last stay.   [] No    Substance Use/Alcohol Use/Addiction (document name of substance, age of onset, how much and how often, route of use and date of last use):  [] Reports   Substance:  Age of Onset:   How Much and how often:  Last Usage    [x] Denies    AUDIT: 0  DAST: 0  PHQ 9: not assessed    Education provided:  [] Yes  [] No  [x] N/A    Learners: Patient []  Family []  Significant Other  [] Caregiver [] Other []   Readiness: Eager []   Acceptance  []   Nonacceptances []   Refused []   Method: Explanation []   Handout []   Response: Verbalizes Understanding []   No evidence of Learning []   Refuses []     Referral made to [facility]  [] Yes       Date:              [x] No    Family History of Mental Illness or Substance Use/Abuse:   [] Yes (Specify)    [x] No    Trauma and Abuse History:   [] Reports   Specify:    Physical []  Verbal []  Emotional []  Financial []   Sexual []   [x] Denies      Legal History:  []  Yes (Specify)    [x] No     Involvement:  [] Yes (Specify)    [x] No     Employment and/or Benefits:    [x] Yes (Specify)  reports FT employment and SNAP benefits  [] No      Leisure & Recreational Interests and Hobbies/Coping Skills:  being

## 2024-12-08 NOTE — ED NOTES
Pt was awake at the bedside, quiet and cooperative with even respirations.  Pt is eating breakfast at the bedside and drinking fluids

## 2024-12-08 NOTE — ED NOTES
Mother had pleasant interaction with patient at bedside and then left.  Patient is now resting quietly on cart with eyes closed.  No distress observed.

## 2024-12-08 NOTE — ED PROVIDER NOTES
Patient signed out to me at 7PM by Dr. Sullivan. Initial evaluation and presentation as documented in their full ED note.       Pending: Labs      Plan: Psychiatric evaluation and management       Patient is endorsing command hallucinations telling him to hurt his loved ones.  Patient states that he is concerned, and that he may hurt himself because he does not want to harm others.  Patient's labs been reviewed by myself.  Patient has been medically cleared for psychiatric evaluation.      Labs, EKG, and Imaging visualized and interpreted by myself as noted above in ED Course.  Given findings, clinical presentation thought most likely consistent w/    Pt was administered   Medications   haloperidol lactate (HALDOL) injection 10 mg (10 mg IntraMUSCular Given 12/7/24 8034)       Plan:  Medically cleared for psychiatry evaluation for    CRITICAL CARE TIME   None       FINAL IMPRESSION      1. Homicidal ideations    2. Delusions (HCC)    3. Hallucinations          DISPOSITION/PLAN   DISPOSITION                      No data to display                New Prescriptions    No medications on file        Hellen Real MD   Emergency Medicine Attending Physician    (Please note that portions of this note were completed with a voice recognition program.  Efforts were made to edit the dictations but occasionally words are mis-transcribed.)        Hellen Real MD  12/07/24 1946       Hellen Real MD  12/08/24 0522       Hellen Real MD  12/08/24 9723    
5\") (!) 178.4 kg (393 lb 3.2 oz)             Physical Exam  Vitals and nursing note reviewed.   Constitutional:       Appearance: Normal appearance. He is well-developed.   HENT:      Head: Normocephalic and atraumatic.      Nose: Nose normal.   Eyes:      Conjunctiva/sclera: Conjunctivae normal.      Pupils: Pupils are equal, round, and reactive to light.   Neck:      Trachea: Trachea normal.   Cardiovascular:      Rate and Rhythm: Normal rate and regular rhythm.      Pulses: Normal pulses.      Heart sounds: Normal heart sounds.   Pulmonary:      Effort: Pulmonary effort is normal.      Breath sounds: Normal breath sounds.   Abdominal:      General: Bowel sounds are normal.      Palpations: Abdomen is soft.   Musculoskeletal:         General: Normal range of motion.      Cervical back: Full passive range of motion without pain, normal range of motion and neck supple.   Skin:     General: Skin is warm and dry.      Capillary Refill: Capillary refill takes less than 2 seconds.   Neurological:      Mental Status: He is alert and oriented to person, place, and time.   Psychiatric:         Attention and Perception: Attention normal. He perceives auditory hallucinations.         Mood and Affect: Mood is elated.         Speech: Speech normal.         Behavior: Behavior normal. Behavior is cooperative.         Thought Content: Thought content is delusional.         Cognition and Memory: Cognition is impaired.         Judgment: Judgment is impulsive and inappropriate.      Comments: Patient demonstrates evidence of auditory hallucinations.  He is not acting on them at this moment.  He is religiously preoccupied.  However he is pleasant in conversation and very cooperative.         DIAGNOSTIC RESULTS     EKG: All EKG's are interpreted by the Emergency Department Physician who either signs or Co-signsthis chart in the absence of a cardiologist.    -    RADIOLOGY:   Non-plain filmimages such as CT, Ultrasound and MRI are read

## 2024-12-08 NOTE — ED NOTES
Pt asked for and was given toilet articles to wash up in the bathroom, clean gown and pants.  Pt is awake and in the bathroom, quiet and cooperative

## 2024-12-09 LAB
EKG ATRIAL RATE: 93 BPM
EKG P AXIS: 28 DEGREES
EKG P-R INTERVAL: 160 MS
EKG Q-T INTERVAL: 368 MS
EKG QRS DURATION: 92 MS
EKG QTC CALCULATION (BAZETT): 457 MS
EKG R AXIS: 69 DEGREES
EKG T AXIS: 43 DEGREES
EKG VENTRICULAR RATE: 93 BPM
GLUCOSE BLD-MCNC: 129 MG/DL (ref 70–99)
GLUCOSE BLD-MCNC: 136 MG/DL (ref 70–99)
GLUCOSE BLD-MCNC: 149 MG/DL (ref 70–99)
GLUCOSE BLD-MCNC: 149 MG/DL (ref 70–99)
PERFORMED ON: ABNORMAL

## 2024-12-09 PROCEDURE — 99223 1ST HOSP IP/OBS HIGH 75: CPT | Performed by: PSYCHIATRY & NEUROLOGY

## 2024-12-09 PROCEDURE — 93010 ELECTROCARDIOGRAM REPORT: CPT | Performed by: INTERNAL MEDICINE

## 2024-12-09 PROCEDURE — 6370000000 HC RX 637 (ALT 250 FOR IP): Performed by: INTERNAL MEDICINE

## 2024-12-09 PROCEDURE — 6370000000 HC RX 637 (ALT 250 FOR IP): Performed by: PSYCHIATRY & NEUROLOGY

## 2024-12-09 PROCEDURE — 1240000000 HC EMOTIONAL WELLNESS R&B

## 2024-12-09 RX ORDER — IBUPROFEN 600 MG/1
600 TABLET, FILM COATED ORAL EVERY 6 HOURS PRN
Status: DISCONTINUED | OUTPATIENT
Start: 2024-12-09 | End: 2024-12-12 | Stop reason: HOSPADM

## 2024-12-09 RX ORDER — RISPERIDONE 0.25 MG/1
0.5 TABLET ORAL 2 TIMES DAILY
Status: DISCONTINUED | OUTPATIENT
Start: 2024-12-09 | End: 2024-12-12 | Stop reason: HOSPADM

## 2024-12-09 RX ADMIN — HYDROXYZINE PAMOATE 50 MG: 50 CAPSULE ORAL at 08:54

## 2024-12-09 RX ADMIN — ACETAMINOPHEN 650 MG: 325 TABLET ORAL at 20:54

## 2024-12-09 RX ADMIN — OXCARBAZEPINE 150 MG: 150 TABLET, FILM COATED ORAL at 20:54

## 2024-12-09 RX ADMIN — LOSARTAN POTASSIUM 100 MG: 50 TABLET, FILM COATED ORAL at 08:54

## 2024-12-09 RX ADMIN — RISPERIDONE 0.5 MG: 0.25 TABLET, FILM COATED ORAL at 20:54

## 2024-12-09 RX ADMIN — ATORVASTATIN CALCIUM 20 MG: 20 TABLET, FILM COATED ORAL at 20:54

## 2024-12-09 RX ADMIN — IBUPROFEN 600 MG: 600 TABLET, FILM COATED ORAL at 17:07

## 2024-12-09 RX ADMIN — RISPERIDONE 0.5 MG: 0.25 TABLET, FILM COATED ORAL at 10:11

## 2024-12-09 RX ADMIN — IBUPROFEN 600 MG: 600 TABLET, FILM COATED ORAL at 10:03

## 2024-12-09 RX ADMIN — ACETAMINOPHEN 650 MG: 325 TABLET ORAL at 08:54

## 2024-12-09 RX ADMIN — METOPROLOL SUCCINATE 50 MG: 50 TABLET, EXTENDED RELEASE ORAL at 08:54

## 2024-12-09 RX ADMIN — ACETAMINOPHEN 650 MG: 325 TABLET ORAL at 13:38

## 2024-12-09 RX ADMIN — TRAZODONE HYDROCHLORIDE 50 MG: 50 TABLET ORAL at 20:54

## 2024-12-09 RX ADMIN — OXCARBAZEPINE 150 MG: 150 TABLET, FILM COATED ORAL at 08:53

## 2024-12-09 ASSESSMENT — PAIN DESCRIPTION - ORIENTATION
ORIENTATION: LEFT;RIGHT
ORIENTATION: RIGHT
ORIENTATION: RIGHT;UPPER
ORIENTATION: LEFT;RIGHT

## 2024-12-09 ASSESSMENT — PAIN SCALES - GENERAL
PAINLEVEL_OUTOF10: 3
PAINLEVEL_OUTOF10: 6
PAINLEVEL_OUTOF10: 6
PAINLEVEL_OUTOF10: 1
PAINLEVEL_OUTOF10: 0
PAINLEVEL_OUTOF10: 1
PAINLEVEL_OUTOF10: 0
PAINLEVEL_OUTOF10: 8
PAINLEVEL_OUTOF10: 10

## 2024-12-09 ASSESSMENT — PAIN DESCRIPTION - DESCRIPTORS
DESCRIPTORS: ACHING

## 2024-12-09 ASSESSMENT — PAIN DESCRIPTION - LOCATION
LOCATION: GENERALIZED
LOCATION: TEETH
LOCATION: GENERALIZED
LOCATION: TEETH
LOCATION: JAW;TEETH
LOCATION: TEETH

## 2024-12-09 NOTE — PROGRESS NOTES
Behavioral Services  Medicare Certification Upon Admission    I certify that this patient's inpatient psychiatric hospital admission is medically necessary for:    [x] (1) Treatment which could reasonably be expected to improve this patient's condition,       [x] (2) Or for diagnostic study;     AND     [x](2) The inpatient psychiatric services are provided while the individual is under the care of a physician and are included in the individualized plan of care.    Estimated length of stay/service 3-5    Plan for post-hospital care OP care    Electronically signed by PAUL VARELA MD on 12/9/2024 at 9:48 AM

## 2024-12-09 NOTE — GROUP NOTE
Patient did not attend group.    Date: 12/9/2024    Group Start Time: 1350  Group End Time: 1425  Group Topic: Music Therapy    ML 3W Veda Courtney    Challenging Negative Self-Talk through Cyndie Substitution  Active Music Making, Live Music Listening, Cyndie Analysis/Discussion, & Songwriting    Patients will listen to \"I Am Light\" by Zena Kilpatrick and discuss interpretations, lyrics, and themes derived from the song. Patients will be asked to identify positive/negative qualities about themselves to be used in a group cyndie substitution. Patients will be encouraged to contribute lyrics, recreate/perform the song with percussion instruments, and discuss the experience as a group.    Focus: Building Positive Experiences, Challenging Negative Self-Talk, Identity Development    Goals: Improve Self-Expression, Improve Self-Esteem, Improve Mood, Increase Socialization, Develop/Maintain Coping Skills, Improve Self-Awareness/Insight, Increase Sensory Stimulation     Signature: Veda Mary, Licensed Professional Music Therapist (LPMT)

## 2024-12-09 NOTE — GROUP NOTE
Patient did not attend group.    Date: 12/9/2024    Group Start Time: 0920  Group End Time: 0940  Group Topic: Community Meeting    [facility] 3W Veda Courtney    Coping Skills  Cyndie Analysis, Receptive Music Listening    Patients will listen to \"3 Things\" by Jimmy Cowan and identify lyrics, themes, and interpretations derived from the song. Patients will point out coping skills that are present in the song, and discuss strategies they have used in the past.       Focus: Building Positive Experiences, Coping Skills, Grounding Techniques, Processing    Goals: Improve Mood, Improve Insight/Self-Awareness, Increase Socialization/Community Building, Improve Self-Expression, Improve Attention to Task, Promote Reality Orientation     Signature: Veda Mary, Licensed Professional Music Therapist (LPMT)

## 2024-12-09 NOTE — CARE COORDINATION
12/9/2024 @ 0931 - Patient was approached regarding the completion of the Leisure Assessment. He presented as bit confused and low functioning. Patient stated he likes to watch TV when he has free time. Patient's primary focus for this admission is his anger. He is unsure is medication is going to help. Patient does not believe he has an adequate support system. He spends much of his time alone. It was noted, during this interview, that patient was easily distracted and had difficulty with focus. Patient was partially engaged but cooperative the entire session.    Documentation completed by: Petra CASTELLANOS LPAT

## 2024-12-09 NOTE — PROGRESS NOTES
Nutrition Note    Nutrition referral for ' Home TF or TPN', entry made in error, no assessment indicated     Electronically signed by JORDYN BETTS RD, LD on 12/9/24 at 8:49 AM EST

## 2024-12-09 NOTE — CARE COORDINATION
FAMILY COLLATERAL NOTE    Family/Support Name: Isabel   Contact #:[phone]   Relationship to Pt:: Mother    Family/Support contact aware of hospitalization: Yes    Presenting Symptoms/Current Concerns:  No symptoms or concerns  -Patient did not take his medication on Saturday    -Patient is taking Ingrezza 80mg one time a day in the evening     Top 3 Life Stressors:   Relationship issues with girlfriend.    Background History Relevant to Current Hospitalization:  Hx of inpatient in 2017    Family Mental Health/Substance Use History:   None    Discharge Plan:   Return home with mother    Support Network Supportive of Discharge Plan:   Yes    Support can confirm Safety of Location and Security of Weapons:   No weapons    Support agreeable to Safeguard and Monitor Medications (including Prescription and OTC):   Yes    Identified Barriers to Compliance with Discharge Plan:   Nothing    Recommendations for Support Network:   Available for follow up calls.      JEREMIAS Whatley
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
## 2024-12-09 NOTE — GROUP NOTE
Group Therapy Note    Date: 12/9/2024    Group Start Time: 1010  Group End Time: 1100  Group Topic: Art Therapy     IVONNE 3W Petra Carvalho, CAMERONW        Group Therapy Note    Attendees: 4       Patient's Goal:  To participate in morning group art therapy.    Notes:  Patient did not attend.     Modes of Intervention: Activity      Discipline Responsible: Psychoeducational Specialist      Signature:  Pet[facility] Fisher [facility]
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
## 2024-12-09 NOTE — FLOWSHEET NOTE
Pt has been visible out on the unit social with peers. Pt is loud with pressured speech. Able to make needs known. Pt did shower today and encouraged to attend groups and other milieu activities. Pt is easily distracted and had difficulty staying focused. Pt denies SI/HI/AVH.Pt requested prn trazodone for insomnia.

## 2024-12-10 LAB
GLUCOSE BLD-MCNC: 110 MG/DL (ref 70–99)
GLUCOSE BLD-MCNC: 133 MG/DL (ref 70–99)
GLUCOSE BLD-MCNC: 165 MG/DL (ref 70–99)
GLUCOSE BLD-MCNC: 219 MG/DL (ref 70–99)
PERFORMED ON: ABNORMAL

## 2024-12-10 PROCEDURE — 1240000000 HC EMOTIONAL WELLNESS R&B

## 2024-12-10 PROCEDURE — 6370000000 HC RX 637 (ALT 250 FOR IP): Performed by: PSYCHIATRY & NEUROLOGY

## 2024-12-10 PROCEDURE — 6370000000 HC RX 637 (ALT 250 FOR IP): Performed by: INTERNAL MEDICINE

## 2024-12-10 PROCEDURE — 99232 SBSQ HOSP IP/OBS MODERATE 35: CPT | Performed by: PSYCHIATRY & NEUROLOGY

## 2024-12-10 RX ORDER — DEXTROSE MONOHYDRATE 100 MG/ML
INJECTION, SOLUTION INTRAVENOUS CONTINUOUS PRN
Status: DISCONTINUED | OUTPATIENT
Start: 2024-12-10 | End: 2024-12-12 | Stop reason: HOSPADM

## 2024-12-10 RX ORDER — GLUCAGON 1 MG/ML
1 KIT INJECTION PRN
Status: DISCONTINUED | OUTPATIENT
Start: 2024-12-10 | End: 2024-12-12 | Stop reason: HOSPADM

## 2024-12-10 RX ADMIN — METOPROLOL SUCCINATE 50 MG: 50 TABLET, EXTENDED RELEASE ORAL at 09:35

## 2024-12-10 RX ADMIN — LOSARTAN POTASSIUM 100 MG: 50 TABLET, FILM COATED ORAL at 09:35

## 2024-12-10 RX ADMIN — OXCARBAZEPINE 150 MG: 150 TABLET, FILM COATED ORAL at 20:53

## 2024-12-10 RX ADMIN — INSULIN LISPRO 2 UNITS: 100 INJECTION, SOLUTION INTRAVENOUS; SUBCUTANEOUS at 16:01

## 2024-12-10 RX ADMIN — RISPERIDONE 0.5 MG: 0.25 TABLET, FILM COATED ORAL at 20:52

## 2024-12-10 RX ADMIN — RISPERIDONE 0.5 MG: 0.25 TABLET, FILM COATED ORAL at 09:35

## 2024-12-10 RX ADMIN — ATORVASTATIN CALCIUM 20 MG: 20 TABLET, FILM COATED ORAL at 20:52

## 2024-12-10 RX ADMIN — OXCARBAZEPINE 150 MG: 150 TABLET, FILM COATED ORAL at 09:35

## 2024-12-10 RX ADMIN — HYDROXYZINE PAMOATE 50 MG: 50 CAPSULE ORAL at 20:52

## 2024-12-10 ASSESSMENT — PAIN SCALES - GENERAL: PAINLEVEL_OUTOF10: 0

## 2024-12-10 NOTE — GROUP NOTE
Patient did not attend group.    Date: 12/10/2024    Group Start Time: 0930  Group End Time: 1000  Group Topic: Music Therapy    ML 3W Veda Courtney    Identity Development & Personal Strength Identification through Music  Cyndie Analysis/Song Discussion, Receptive Music Listening, Exploration, Clarification    Patients will listen to \"Hero\" by Cassandra Johnson and identify themes/lyrics/interpretations derived from the song. Patients will discuss personal strengths, challenges they've overcome, and how they got through difficult times.     Themes explored in group discussion today:     Goals: Improve Mood, Improve Insight/Self-Awareness, Increase Socialization/Community Building, Improve Self-Expression, Improve Attention to Task, Decrease Negative Thinking, Promote Identity Development      Signature: Veda Mary, Licensed Professional Music Therapist (LPMT)
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
## 2024-12-10 NOTE — GROUP NOTE
Date: 12/10/2024    Group Start Time: 1200  Group End Time: 1235  Group Topic: Psychoeducation    [facility] [address]eda [address]ney    Psycho-education Group: Maslow's Hierarchy of Needs  Clarification, Education, Exploration, Reality-Testing, Support/Validation    Patients will learn about Maslow's Hierarchy of Needs: basic needs, psychological needs, self-fulfillment needs. Patients will discuss how achieving these needs can improve their overall mental health and quality of life. Patients will reflect on what needs are/are not being met currently, and identify ways to meet these needs going forward.     Focus: Identifying Resources, Learn and Apply Concepts    Goals: Improve/Maintain Attention to Task, Improve/Maintain Cognitive Skills, Improve/Maintain Insight / Self-Awareness, Improve/Maintain Self-Expression, Improve/Maintain Use of Coping Skills, and Promote Reality Orientation      Patient listened to presented material and participated in group discussions. Patient explored aspects of financial security and how it can impact your mental health. Patient had some difficulty switching between topics, but engaged in discussion appropriately. Patient left early and did not return.     Attended: 1/2-3/4 attendance  Participation Level: Interactive  Participation Quality: Sharing  Affect/Mood: Congruent/Elevated  Speech: spontaneous   Thought Content/Processes: Describe: [place]   Level of consciousness: Alert  Response: Able to verbalize current knowledge/experience  Outcomes: Progressing towards goal and Actively participated in the group experience    Signature: Veda Mary, Licensed Professional Music Therapist (LPMT)

## 2024-12-10 NOTE — PROGRESS NOTES
Vs obtained, meds were explained and given, pt denied pain despite encouragement to take motrin. Denied the need for vistaril anxiety med.

## 2024-12-10 NOTE — PROGRESS NOTES
[facility]  BEHAVIORAL HEALTH FOLLOW-UP NOTE       12/10/2024     Patient was seen and examined in person, Chart reviewed   Patient's case discussed with staff/team    Chief Complaint: Irene    Interim History:     Pt tooth pain is better  Mood -is better than on admission  Preoccupied with discharge  Has been loud talking with his peers in the unit  [place] thinking  Tolerating medication without side effects  Anger issues better- able to control is temper  Appetite:   [] Normal/Unchanged  [] Increased  [x] Decreased      Sleep:       [] Normal/Unchanged  [x] Fair       [] Poor              Energy:    [] Normal/Unchanged  [] Increased  [x] Decreased        SI [] Present  [x] Absent    HI  []Present  [x] Absent     Aggression:  [] yes  [x] no    Patient is [x] able  [] unable to CONTRACT FOR SAFETY     PAST MEDICAL/PSYCHIATRIC HISTORY:   Past Medical History:   Diagnosis Date    ADHD (attention deficit hyperactivity disorder) 2012    Allergic rhinitis 2012    Autism     Hypertension     Mixed hyperlipidemia 2023    Obesity (BMI 30-39.9) 2012    Sleep apnea        FAMILY/SOCIAL HISTORY:  Family History   Problem Relation Age of Onset    No Known Problems Mother     No Known Problems Father      Social History     Socioeconomic History    Marital status: Single     Spouse name: Not on file    Number of children: Not on file    Years of education: Not on file    Highest education level: Not on file   Occupational History    Not on file   Tobacco Use    Smoking status: Some Days     Current packs/day: 0.00     Average packs/day: 0.5 packs/day for 7.0 years (3.5 ttl pk-yrs)     Types: Cigarettes, Cigars     Start date: 2013     Last attempt to quit: 2018     Years since quittin.5    Smokeless tobacco: Never   Vaping Use    Vaping status: Never Used   Substance and Sexual Activity    Alcohol use: Not Currently     Comment: occ    Drug use: No    Sexual

## 2024-12-10 NOTE — PROGRESS NOTES
Progress Note    Date:12/10/2024       Room:[facility]/W367-01  Patient Name:Tomer Rivera     YOB: 1995     Age:29 y.o.    Assessment        Hospital Problems             Last Modified POA    * (Principal) Schizoaffective disorder, bipolar type (HCC) 12/8/2024 Yes       Plan:      *Schizoaffective disorder, bipolar type  *ADHD, autism  -Psychiatry     *Sleep apnea  -Home CPAP      *Type II diabetes  Accu-Cheks ACHS; ISS; hypoglycemic protocol  -Carb controlled diet  -A1c pending     *Hypertension, hyperlipidemia  -On atorvastatin, losartan, metoprolol     *  -Increase fluids     *Obesity  -BMI 65.4   -Supportive care      Additional work up or/and treatment plan may be added today or then after based on clinical progression by other providers or specialists.  Patient will need to follow-up with PCP for chronic disease management.     I have spent greater than 70% of time  spent focused exclusively on this patient ,reviewing  chart, labs/diagnostics, reconciling medications, &  answering questions with patient and discussing plan.    Subjective   Interval History Status: Patient denies chest pain, palpitations, headache, dizziness, shortness of breath, cough, fever, chills, N/V/D, and changes in appetite.  Blood pressure elevated but has improved 148/87.  Patient continues with losartan 100 mg daily, metoprolol 50 mg daily and atorvastatin 20 mg daily.  .         Review of Systems   12 point review of systems reviewed with patient; negative other than as mentioned    Medications   Scheduled Meds:    risperiDONE  0.5 mg Oral BID    OXcarbazepine  150 mg Oral BID    Valbenazine Tosylate  80 mg Oral Daily    losartan  100 mg Oral Daily    metoprolol succinate  50 mg Oral Daily    atorvastatin  20 mg Oral Daily    insulin lispro  0-8 Units SubCUTAneous 4x Daily AC & HS     Continuous Infusions:    dextrose       PRN Meds: glucose, dextrose bolus **OR** dextrose bolus, glucagon (rDNA), dextrose, ibuprofen,

## 2024-12-10 NOTE — FLOWSHEET NOTE
Pt has been visible out on the unit social with peers ,bright affect. Pt attends select groups and has been medication compliant. Pt endorses adequate appetite and sleep. Pt denies suicidal ideation,intent or plan. Pt denies AVH/HI.

## 2024-12-11 LAB
GLUCOSE BLD-MCNC: 113 MG/DL (ref 70–99)
GLUCOSE BLD-MCNC: 134 MG/DL (ref 70–99)
GLUCOSE BLD-MCNC: 168 MG/DL (ref 70–99)
GLUCOSE BLD-MCNC: 196 MG/DL (ref 70–99)
PERFORMED ON: ABNORMAL

## 2024-12-11 PROCEDURE — 99232 SBSQ HOSP IP/OBS MODERATE 35: CPT | Performed by: PSYCHIATRY & NEUROLOGY

## 2024-12-11 PROCEDURE — 6370000000 HC RX 637 (ALT 250 FOR IP): Performed by: PSYCHIATRY & NEUROLOGY

## 2024-12-11 PROCEDURE — 6370000000 HC RX 637 (ALT 250 FOR IP): Performed by: INTERNAL MEDICINE

## 2024-12-11 PROCEDURE — 1240000000 HC EMOTIONAL WELLNESS R&B

## 2024-12-11 RX ORDER — METOPROLOL SUCCINATE 50 MG/1
100 TABLET, EXTENDED RELEASE ORAL DAILY
Status: DISCONTINUED | OUTPATIENT
Start: 2024-12-12 | End: 2024-12-12

## 2024-12-11 RX ADMIN — OXCARBAZEPINE 150 MG: 150 TABLET, FILM COATED ORAL at 20:41

## 2024-12-11 RX ADMIN — OXCARBAZEPINE 150 MG: 150 TABLET, FILM COATED ORAL at 08:27

## 2024-12-11 RX ADMIN — RISPERIDONE 0.5 MG: 0.25 TABLET, FILM COATED ORAL at 20:41

## 2024-12-11 RX ADMIN — ATORVASTATIN CALCIUM 20 MG: 20 TABLET, FILM COATED ORAL at 20:41

## 2024-12-11 RX ADMIN — LOSARTAN POTASSIUM 100 MG: 50 TABLET, FILM COATED ORAL at 08:27

## 2024-12-11 RX ADMIN — METOPROLOL SUCCINATE 50 MG: 50 TABLET, EXTENDED RELEASE ORAL at 08:27

## 2024-12-11 RX ADMIN — INSULIN LISPRO 2 UNITS: 100 INJECTION, SOLUTION INTRAVENOUS; SUBCUTANEOUS at 16:40

## 2024-12-11 RX ADMIN — IBUPROFEN 600 MG: 600 TABLET, FILM COATED ORAL at 18:13

## 2024-12-11 RX ADMIN — RISPERIDONE 0.5 MG: 0.25 TABLET, FILM COATED ORAL at 08:27

## 2024-12-11 ASSESSMENT — SLEEP AND FATIGUE QUESTIONNAIRES: AVERAGE NUMBER OF SLEEP HOURS: 7

## 2024-12-11 ASSESSMENT — PAIN SCALES - GENERAL: PAINLEVEL_OUTOF10: 6

## 2024-12-11 ASSESSMENT — PAIN DESCRIPTION - LOCATION: LOCATION: HEAD

## 2024-12-11 NOTE — PROGRESS NOTES
[facility]  BEHAVIORAL HEALTH FOLLOW-UP NOTE       2024     Patient was seen and examined in person, Chart reviewed   Patient's case discussed with staff/team    Chief Complaint: Irene    Interim History:     Pt report feeling better  Less depressed  No mood swings or anger outburst  Sleep and appetite good  No SI or HI  Appetite:   [] Normal/Unchanged  [] Increased  [x] Decreased      Sleep:       [] Normal/Unchanged  [x] Fair       [] Poor              Energy:    [] Normal/Unchanged  [] Increased  [x] Decreased        SI [] Present  [x] Absent    HI  []Present  [x] Absent     Aggression:  [] yes  [x] no    Patient is [x] able  [] unable to CONTRACT FOR SAFETY     PAST MEDICAL/PSYCHIATRIC HISTORY:   Past Medical History:   Diagnosis Date    ADHD (attention deficit hyperactivity disorder) 2012    Allergic rhinitis 2012    Autism     Hypertension     Mixed hyperlipidemia 2023    Obesity (BMI 30-39.9) 2012    Sleep apnea        FAMILY/SOCIAL HISTORY:  Family History   Problem Relation Age of Onset    No Known Problems Mother     No Known Problems Father      Social History     Socioeconomic History    Marital status: Single     Spouse name: Not on file    Number of children: Not on file    Years of education: Not on file    Highest education level: Not on file   Occupational History    Not on file   Tobacco Use    Smoking status: Some Days     Current packs/day: 0.00     Average packs/day: 0.5 packs/day for 7.0 years (3.5 ttl pk-yrs)     Types: Cigarettes, Cigars     Start date: 2013     Last attempt to quit: 2018     Years since quittin.5    Smokeless tobacco: Never   Vaping Use    Vaping status: Never Used   Substance and Sexual Activity    Alcohol use: Not Currently     Comment: occ    Drug use: No    Sexual activity: Not Currently   Other Topics Concern    Not on file   Social History Narrative    Not on file     Social Determinants of Health

## 2024-12-11 NOTE — GROUP NOTE
Patient did not attend group.    Date: 12/11/2024    Group Start Time: 0920  Group End Time: 0950  Group Topic: Community Meeting    [facility] [place] [address]W [address]eda [address]    Carnival of Animals  Music Analysis/Interpretation, Music & Mindfulness, Receptive Music Listening    Patients will listen to selections from \"Carnival of Animals\" by Camille Saint-Saens and identify what animal they think the composer intended to represent through the music based on what they hear. Patients will be challenged to stay mindful on the music while listening, and take note of how their emotions might change. Patients will discuss their interpretation of each selection and the benefits and challenges of staying mindful.    Selections: II. Hens; V. Elephant; VI. Kangaroos; VII. Aquarium; XIII. The [place]    Goals: Improve Attention to Task, Develop/Maintain Coping Skills, Improve Mood, Improve/Maintain Self-Expression, Improve Self-Awareness, Decrease Impulsive Behaviors, Increase Relaxation, Maintain Cognitive Skills     Signature: Veda Mary, Licensed Professional Music Therapist (LPMT)

## 2024-12-11 NOTE — GROUP NOTE
Group Therapy Note    Date: 12/11/2024    Group Start Time: 1010  Group End Time: 1100  Group Topic: Art Therapy     [facility] [address]W Petra Carvalho, CAMERONW        Group Therapy Note    Attendees: 5       Patient's Goal:  To participate in morning group art therapy.    Notes:  Patient did not attend.     Modes of Intervention: Activity      Discipline Responsible: Psychoeducational Specialist      Signature:  [facility] Fisher [facility]

## 2024-12-11 NOTE — PROGRESS NOTES
Progress Note    Date:12/11/2024       Room:[facility]/W367-01  Patient Name:Tomer Rivera     YOB: 1995     Age:29 y.o.    Assessment        Hospital Problems             Last Modified POA    * (Principal) Schizoaffective disorder, bipolar type (HCC) 12/8/2024 Yes       Plan:      *Schizoaffective disorder, bipolar type  *ADHD, autism  -Psychiatry     *Sleep apnea  -Home CPAP      *Type II diabetes  Accu-Cheks ACHS; ISS; hypoglycemic protocol  -Carb controlled diet  -A1c pending     *Hypertension, hyperlipidemia  -On Atorvastatin, Losartan, Metoprolol; Metoprolol increase 100 mg daily.     *  -Increase fluids     *Obesity  -BMI 65.4   -Supportive care      Additional work up or/and treatment plan may be added today or then after based on clinical progression by other providers or specialists.  Patient will need to follow-up with PCP for chronic disease management.      I have spent greater than 70% of time  spent focused exclusively on this patient ,reviewing  chart, labs/diagnostics, reconciling medications, &  answering questions with patient and discussing plan.    Subjective   Interval History Status: .  Patient seen for elevated BP,188/78. Repeat 147/76.  Patient denies chest pain, palpitations, headache, dizziness, shortness of breath.  On losartan 100 mg daily and metoprolol 50 mg daily. Will increase Metoprolol to 100 mg daily. Patient agreeable.  .         Review of Systems   12 point review of systems reviewed with patient; negative other than as mentioned     Medications   Scheduled Meds:    [START ON 12/12/2024] metoprolol succinate  100 mg Oral Daily    risperiDONE  0.5 mg Oral BID    OXcarbazepine  150 mg Oral BID    Valbenazine Tosylate  80 mg Oral Daily    losartan  100 mg Oral Daily    atorvastatin  20 mg Oral Daily    insulin lispro  0-8 Units SubCUTAneous 4x Daily AC & HS     Continuous Infusions:    dextrose       PRN Meds: glucose, dextrose bolus **OR** dextrose bolus, glucagon

## 2024-12-12 VITALS
SYSTOLIC BLOOD PRESSURE: 137 MMHG | HEIGHT: 65 IN | WEIGHT: 315 LBS | BODY MASS INDEX: 52.48 KG/M2 | OXYGEN SATURATION: 98 % | RESPIRATION RATE: 18 BRPM | TEMPERATURE: 98 F | DIASTOLIC BLOOD PRESSURE: 82 MMHG | HEART RATE: 85 BPM

## 2024-12-12 LAB
GLUCOSE BLD-MCNC: 129 MG/DL (ref 70–99)
GLUCOSE BLD-MCNC: 137 MG/DL (ref 70–99)
PERFORMED ON: ABNORMAL
PERFORMED ON: ABNORMAL

## 2024-12-12 PROCEDURE — 6370000000 HC RX 637 (ALT 250 FOR IP): Performed by: PSYCHIATRY & NEUROLOGY

## 2024-12-12 PROCEDURE — 6370000000 HC RX 637 (ALT 250 FOR IP): Performed by: REGISTERED NURSE

## 2024-12-12 PROCEDURE — 6370000000 HC RX 637 (ALT 250 FOR IP): Performed by: INTERNAL MEDICINE

## 2024-12-12 RX ORDER — RISPERIDONE 0.5 MG/1
0.5 TABLET ORAL 2 TIMES DAILY
Qty: 60 TABLET | Refills: 3 | Status: SHIPPED | OUTPATIENT
Start: 2024-12-12

## 2024-12-12 RX ORDER — OXCARBAZEPINE 150 MG/1
150 TABLET, FILM COATED ORAL 2 TIMES DAILY
Qty: 60 TABLET | Refills: 1 | Status: SHIPPED | OUTPATIENT
Start: 2024-12-12

## 2024-12-12 RX ORDER — METOPROLOL SUCCINATE 50 MG/1
50 TABLET, EXTENDED RELEASE ORAL DAILY
Status: DISCONTINUED | OUTPATIENT
Start: 2024-12-13 | End: 2024-12-12 | Stop reason: HOSPADM

## 2024-12-12 RX ADMIN — HYDROXYZINE PAMOATE 50 MG: 50 CAPSULE ORAL at 09:30

## 2024-12-12 RX ADMIN — RISPERIDONE 0.5 MG: 0.25 TABLET, FILM COATED ORAL at 09:30

## 2024-12-12 RX ADMIN — METOPROLOL SUCCINATE 100 MG: 50 TABLET, EXTENDED RELEASE ORAL at 09:31

## 2024-12-12 RX ADMIN — LOSARTAN POTASSIUM 100 MG: 50 TABLET, FILM COATED ORAL at 09:31

## 2024-12-12 RX ADMIN — OXCARBAZEPINE 150 MG: 150 TABLET, FILM COATED ORAL at 09:30

## 2024-12-12 NOTE — PLAN OF CARE
Problem: Discharge Planning  Goal: Discharge to home or other facility with appropriate resources  12/11/2024 2229 by Chastity Marquez RN  Outcome: Progressing  12/11/2024 0946 by Sarai Campbell RN  Outcome: Progressing  Flowsheets (Taken 12/11/2024 0937)  Discharge to home or other facility with appropriate resources: Identify barriers to discharge with patient and caregiver     Problem: Depression  Goal: Will be euthymic at discharge  Description: INTERVENTIONS:  1. Administer medication as ordered  2. Provide emotional support via 1:1 interaction with staff  3. Encourage involvement in milieu/groups/activities  4. Monitor for social isolation  12/11/2024 2229 by Chastity Marquez RN  Outcome: Progressing  12/11/2024 0946 by Sarai Campbell RN  Outcome: Progressing     Problem: Behavior  Goal: Pt/Family maintain appropriate behavior and adhere to behavioral management agreement, if implemented  Description: INTERVENTIONS:  1. Assess patient/family's coping skills and  non-compliant behavior (including use of illegal substances)  2. Notify security of behavior or suspected illegal substances which indicate the need for search of the family and/or belongings  3. Encourage verbalization of thoughts and concerns in a socially appropriate manner  4. Utilize positive, consistent limit setting strategies supporting safety of patient, staff and others  5. Encourage participation in the decision making process about the behavioral management agreement  6. If a visitor's behavior poses a threat to safety call refer to organization policy.  7. Initiate consult with , Psychosocial CNS, Spiritual Care as appropriate  12/11/2024 2229 by Chastity Marquez RN  Outcome: Progressing  12/11/2024 0946 by Sarai Campbell RN  Outcome: Progressing  Flowsheets (Taken 12/10/2024 0959 by Cindy RN)  Patient/family maintains appropriate behavior and adheres to behavioral management agreement, if 
  Problem: Involuntary Admit  Goal: Will cooperate with staff recommendations and doctor's orders and will demonstrate appropriate behavior  Description: INTERVENTIONS:  1. Treat underlying conditions and offer medication as ordered  2. Educate regarding involuntary admission procedures and rules  3. Contain excessive/inappropriate behavior per unit and hospital policies  12/9/2024 2211 by Cally Wood RN  Outcome: Progressing  12/9/2024 1023 by Katie Navarro RN  Outcome: Progressing  12/9/2024 1022 by Katie Navarro RN  Outcome: Progressing     Problem: Discharge Planning  Goal: Discharge to home or other facility with appropriate resources  12/9/2024 2211 by Cally Wood RN  Outcome: Progressing  12/9/2024 1023 by Katie Navarro RN  Outcome: Progressing  12/9/2024 1022 by Katie Navarro RN  Outcome: Progressing     Problem: Depression  Goal: Will be euthymic at discharge  Description: INTERVENTIONS:  1. Administer medication as ordered  2. Provide emotional support via 1:1 interaction with staff  3. Encourage involvement in milieu/groups/activities  4. Monitor for social isolation  12/9/2024 2211 by Cally Wood RN  Outcome: Progressing  12/9/2024 1023 by Katie Navarro RN  Outcome: Progressing     Problem: Behavior  Goal: Pt/Family maintain appropriate behavior and adhere to behavioral management agreement, if implemented  Description: INTERVENTIONS:  1. Assess patient/family's coping skills and  non-compliant behavior (including use of illegal substances)  2. Notify security of behavior or suspected illegal substances which indicate the need for search of the family and/or belongings  3. Encourage verbalization of thoughts and concerns in a socially appropriate manner  4. Utilize positive, consistent limit setting strategies supporting safety of patient, staff and others  5. Encourage participation in the decision making process about the behavioral management agreement  6. If a 
Patient is out and social. Laughing loudly but redirectable. Rapid pressured speech. Poor concentration. Incongruent affect. Denies anxiety. Rates depression 6/10. Currently denies SI/HI and AVH. Reports good sleep and appetite. Denies further needs at this time   Problem: Self Harm/Suicidality  Goal: Will have no self-injury during hospital stay  Description: INTERVENTIONS:  1.  Ensure constant observer at bedside with Q15M safety checks  2.  Maintain a safe environment  3.  Secure patient belongings  4.  Ensure family/visitors adhere to safety recommendations  5.  Ensure safety tray has been added to patient's diet order  6.  Every shift and PRN: Re-assess suicidal risk via Frequent Screener    12/8/2024 2118 by Cally Wood RN  Outcome: Progressing  12/8/2024 1813 by Isabel Toro RN  Outcome: Progressing     Problem: Involuntary Admit  Goal: Will cooperate with staff recommendations and doctor's orders and will demonstrate appropriate behavior  Description: INTERVENTIONS:  1. Treat underlying conditions and offer medication as ordered  2. Educate regarding involuntary admission procedures and rules  3. Contain excessive/inappropriate behavior per unit and hospital policies  12/8/2024 2118 by Cally Wood, RN  Outcome: Progressing  12/8/2024 1813 by Isabel Toro RN  Outcome: Progressing     Problem: Discharge Planning  Goal: Discharge to home or other facility with appropriate resources  12/8/2024 2118 by Cally Wood RN  Outcome: Progressing  12/8/2024 1813 by Isabel Toro RN  Outcome: Progressing     
Pt is visible on the unit and social with peers. Difficulty concentrating and difficulty sitting still. Denies depression and anxiety. Denies SI/HI/AVH. Reports he came to get some help with his mind and he is feeling better. Pt is loud with RPS. Reports good sleep and appetite.     Problem: Involuntary Admit  Goal: Will cooperate with staff recommendations and doctor's orders and will demonstrate appropriate behavior  Description: INTERVENTIONS:  1. Treat underlying conditions and offer medication as ordered  2. Educate regarding involuntary admission procedures and rules  3. Contain excessive/inappropriate behavior per unit and hospital policies  12/9/2024 1023 by Katie Navarro RN  Outcome: Progressing  12/9/2024 1022 by Katie Navarro RN  Outcome: Progressing  12/8/2024 2118 by Cally Wodo RN  Outcome: Progressing     Problem: Discharge Planning  Goal: Discharge to home or other facility with appropriate resources  12/9/2024 1023 by Katie Navarro RN  Outcome: Progressing  12/9/2024 1022 by Katie Navarro RN  Outcome: Progressing  12/8/2024 2118 by Cally Wood RN  Outcome: Progressing     Problem: Depression  Goal: Will be euthymic at discharge  Description: INTERVENTIONS:  1. Administer medication as ordered  2. Provide emotional support via 1:1 interaction with staff  3. Encourage involvement in milieu/groups/activities  4. Monitor for social isolation  Outcome: Progressing     Problem: Behavior  Goal: Pt/Family maintain appropriate behavior and adhere to behavioral management agreement, if implemented  Description: INTERVENTIONS:  1. Assess patient/family's coping skills and  non-compliant behavior (including use of illegal substances)  2. Notify security of behavior or suspected illegal substances which indicate the need for search of the family and/or belongings  3. Encourage verbalization of thoughts and concerns in a socially appropriate manner  4. Utilize positive, consistent 
Pt visible on unit. Social with select peers. Loud, intrusive and childlike. Easily preoccupied with various topics. Thoughts disorganized. Friendly and cooperative with staff and peers. Maintains good eye contact. Hopeful for D/C soon. Denies any SI, HI or AVH at this time. Denies any depression or anxiety. Endorses good sleep and appetite. Noted to have showered without difficulty. Refuses to attend groups despite several attempts. Seen walking unit with peers.     Problem: Discharge Planning  Goal: Discharge to home or other facility with appropriate resources  Outcome: Progressing  Flowsheets (Taken 12/11/2024 7230)  Discharge to home or other facility with appropriate resources: Identify barriers to discharge with patient and caregiver     Problem: Depression  Goal: Will be euthymic at discharge  Description: INTERVENTIONS:  1. Administer medication as ordered  2. Provide emotional support via 1:1 interaction with staff  3. Encourage involvement in milieu/groups/activities  4. Monitor for social isolation  Outcome: Progressing     Problem: Behavior  Goal: Pt/Family maintain appropriate behavior and adhere to behavioral management agreement, if implemented  Description: INTERVENTIONS:  1. Assess patient/family's coping skills and  non-compliant behavior (including use of illegal substances)  2. Notify security of behavior or suspected illegal substances which indicate the need for search of the family and/or belongings  3. Encourage verbalization of thoughts and concerns in a socially appropriate manner  4. Utilize positive, consistent limit setting strategies supporting safety of patient, staff and others  5. Encourage participation in the decision making process about the behavioral management agreement  6. If a visitor's behavior poses a threat to safety call refer to organization policy.  7. Initiate consult with , Psychosocial CNS, Spiritual Care as appropriate  Outcome: Progressing  Flowsheets 
Visible on the unit, social with staff and peers. Walks with strong and steady gait. Denies SI, HI, AVH. Appears brightened, elevated. Remains cooperative, friendly, restless, verbal. Verbalizes feeling like he is ready to go home and is excited to see his children. Reports good sleep and appetite. Speaks with a loud tone regardless of situation and can be disruptive to his peers. Declined offer of coloring pages and word search puzzles. Encouraged to participate in groups. Currently watching tv in the dayroom.       Problem: Involuntary Admit  Goal: Will cooperate with staff recommendations and doctor's orders and will demonstrate appropriate behavior  Description: INTERVENTIONS:  1. Treat underlying conditions and offer medication as ordered  2. Educate regarding involuntary admission procedures and rules  3. Contain excessive/inappropriate behavior per unit and hospital policies  12/10/2024 0956 by Cindy Urena RN  Outcome: Progressing  12/9/2024 2211 by Cally Wood RN  Outcome: Progressing     Problem: Discharge Planning  Goal: Discharge to home or other facility with appropriate resources  12/10/2024 0956 by Cindy Urena RN  Outcome: Progressing  12/9/2024 2211 by Cally Wood RN  Outcome: Progressing     Problem: Depression  Goal: Will be euthymic at discharge  Description: INTERVENTIONS:  1. Administer medication as ordered  2. Provide emotional support via 1:1 interaction with staff  3. Encourage involvement in milieu/groups/activities  4. Monitor for social isolation  12/10/2024 0956 by Cindy Urena RN  Outcome: Progressing  12/9/2024 2211 by Cally Wood RN  Outcome: Progressing     Problem: Behavior  Goal: Pt/Family maintain appropriate behavior and adhere to behavioral management agreement, if implemented  Description: INTERVENTIONS:  1. Assess patient/family's coping skills and  non-compliant behavior (including use of illegal substances)  2. Notify security of behavior or suspected 
Progressing

## 2024-12-12 NOTE — DISCHARGE INSTRUCTIONS
Someone from [facility] will be calling you tomorrow to follow up on your care. If you don't hear from us, give us a call! [phone].    Keep all follow up appointments, take medications as ordered, utilize positive supports, abstain from use of alcohol and drugs. If symptoms return or you feel at risk to yourself or others, please call 911, return the nearest emergency room, or call your local crisis hotline:  [facility]: [phone]  [facility]: [phone]  [facility]: [phone]    Due to the Covid-19 Pandemic, [facility] is not currently available. For assistance with quitting smoking please go to https://smokefree.gov. A prescription for an FDA-approved tobacco cessation medication was offered at discharge and the patient refused.    You were offered a flu vaccine but declined

## 2024-12-12 NOTE — GROUP NOTE
Patient did not attend group / Anticipated discharge today.    Date: 12/12/2024    Group Start Time: 0920  Group End Time: 1005  Group Topic: Community Meeting    [facility] [place] [address] to Start Your Day  Music Interventions: Music Reminiscence, Receptive Music Listening, and Playlist Building    Patients will identify different songs that fall under various prompts (support/validation, motivation, energy, relaxation, humor). Patients will be invited to select a song that aligns with something they need today. Patients will be encouraged to explain their connection to the song and the experience of listening to it in this setting.    Focus: Emotion Regulation, Validation/Support, Processing    Goals: Improve Mood, Improve Insight/Self-Awareness, Increase Socialization/Community Building, Increase Self-Expression, Improve Attention to Task, Improve Coping Skills/Develop Coping Skills     Signature: Veda Mary, Licensed Professional Music Therapist (LPMT)

## 2024-12-12 NOTE — PROGRESS NOTES
Progress Note    Date:12/12/2024       Room:[facility]/W367-01  Patient Name:Tomer Rivera     YOB: 1995     Age:29 y.o.    Assessment        Hospital Problems             Last Modified POA    * (Principal) Schizoaffective disorder, bipolar type (HCC) 12/8/2024 Yes       Plan:      *Schizoaffective disorder, bipolar type  *ADHD, autism  -Psychiatry     *Sleep apnea  -Home CPAP      *Type II diabetes  Accu-Cheks ACHS; ISS; hypoglycemic protocol  -Carb controlled diet  -A1c pending     *Hypertension, hyperlipidemia  -On Atorvastatin, Losartan, Metoprolol; Metoprolol increase 100 mg daily.     *  -Increase fluids     *Obesity  -BMI 65.4   -Supportive care      Additional work up or/and treatment plan may be added today or then after based on clinical progression by other providers or specialists.  Patient will need to follow-up with PCP for chronic disease management.      I have spent greater than 70% of time  spent focused exclusively on this patient ,reviewing  chart, labs/diagnostics, reconciling medications, &  answering questions with patient and discussing plan.       Subjective   Interval History Status: Patient scheduled for discharge this afternoon.  Blood pressures remain liable.  Last /82.   Concern for beta-blockers to treat essential hypertension only.  Patient with respiratory history and obesity.  Patient and mother advised to follow with PCP for further evaluation and management to review.        Review of Systems   12 point review of systems reviewed with patient; negative other than as mentioned    Medications   Scheduled Meds:    [START ON 12/13/2024] metoprolol succinate  50 mg Oral Daily    risperiDONE  0.5 mg Oral BID    OXcarbazepine  150 mg Oral BID    Valbenazine Tosylate  80 mg Oral Daily    losartan  100 mg Oral Daily    atorvastatin  20 mg Oral Daily    insulin lispro  0-8 Units SubCUTAneous 4x Daily AC & HS     Continuous Infusions:    dextrose       PRN Meds: glucose,
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
## 2024-12-12 NOTE — GROUP NOTE
Patient did not attend group / Anticipated discharge today.    Date: 12/12/2024    Group Start Time: 1015  Group End Time: 1050  Group Topic: Music Therapy    [facility] [place] [place]    Active Music Making, Live Music Listening, and Music Reminiscence  Patients will be given a songbook and offered the opportunity to select (a) song(s) of their choice for live music listening on [email or web address]. Patients will be encouraged to sing along, move along, and listen to the music.    Focus: Building Positive Experiences, Processing, and Relaxation     Goals: Improve Mood, Decrease Isolation, Increase Socialization, Improve Self-Esteem, Improve Self-Expression, Provide Reality Orientation, Develop Coping Skills     Signature: Veda Mary, Licensed Professional Music Therapist (LPMT)

## 2024-12-12 NOTE — PROGRESS NOTES
CLINICAL PHARMACY NOTE: MEDS TO BEDS    Total # of Prescriptions Filled: 1   The following medications were delivered to the patient:  Risperidone 0.5mg tab    Additional Documentation:

## 2024-12-12 NOTE — DISCHARGE INSTR - DIET
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
## 2024-12-24 ENCOUNTER — APPOINTMENT (OUTPATIENT)
Dept: CARDIOLOGY | Facility: CLINIC | Age: 29
End: 2024-12-24
Payer: MEDICARE

## 2024-12-24 VITALS
HEIGHT: 66 IN | HEART RATE: 90 BPM | WEIGHT: 315 LBS | SYSTOLIC BLOOD PRESSURE: 130 MMHG | BODY MASS INDEX: 50.62 KG/M2 | DIASTOLIC BLOOD PRESSURE: 88 MMHG

## 2024-12-24 DIAGNOSIS — E78.2 MIXED HYPERLIPIDEMIA: ICD-10-CM

## 2024-12-24 DIAGNOSIS — I10 PRIMARY HYPERTENSION: ICD-10-CM

## 2024-12-24 DIAGNOSIS — E66.01 MORBID OBESITY (MULTI): ICD-10-CM

## 2024-12-24 DIAGNOSIS — G47.30 SLEEP APNEA TREATED WITH NOCTURNAL BIPAP: Primary | ICD-10-CM

## 2024-12-24 DIAGNOSIS — E11.9 DIABETES MELLITUS TYPE II, NON INSULIN DEPENDENT (MULTI): ICD-10-CM

## 2024-12-24 PROCEDURE — 3008F BODY MASS INDEX DOCD: CPT | Performed by: INTERNAL MEDICINE

## 2024-12-24 PROCEDURE — 3075F SYST BP GE 130 - 139MM HG: CPT | Performed by: INTERNAL MEDICINE

## 2024-12-24 PROCEDURE — 99214 OFFICE O/P EST MOD 30 MIN: CPT | Performed by: INTERNAL MEDICINE

## 2024-12-24 PROCEDURE — G2211 COMPLEX E/M VISIT ADD ON: HCPCS | Performed by: INTERNAL MEDICINE

## 2024-12-24 PROCEDURE — 4010F ACE/ARB THERAPY RXD/TAKEN: CPT | Performed by: INTERNAL MEDICINE

## 2024-12-24 PROCEDURE — 3079F DIAST BP 80-89 MM HG: CPT | Performed by: INTERNAL MEDICINE

## 2024-12-24 NOTE — PROGRESS NOTES
"1 year follow-up visit.    Subjective :   Antonio has multiple comorbidities.  His BMI is in excess of 60.  He reports obstructive sleep apnea and says he uses CPAP therapy regularly  He works 2 jobs  Denies chest pressure tightness heaviness or palpitations denies lightheadedness presyncope syncope or falls or lower extremity edema.  Did not come in with his medications, hence unclear as to what he takes.  Blood pressure is at target 130/88  He is a diabetic.  He may benefit from Ozempic or Wegovy, currently he is on Trulicity, but we are not sure of that.      12 point ROS is negative or non contributory except as noted.   History so Far :    Patient with bipolar disorder, hypertension, morbid obesity, hypokalemia, presents for follow-up after echocardiogram, accompanied by mother to the office.     Reports feeling well overall, unable to tolerate CPAP, compliant with medications, does not report any cardiac symptoms.  Laboratory data from March 2022 reviewed. Hemoglobin 14.2 hematocrit 41.2 in June 2022 sodium 139 potassium 3.9 glucose 66 GFR greater than 60 liver enzymes were normal  Echo 8/31/2022-LVEF 50 to 55% normal diastolic filling pattern normal RV size and function grossly normal valves no pericardial effusion LV end-systolic dimension 4 cm LV posterior wall 1.2 cm septum 1 cm left atrium 4 cm left atrial volume index 46.2 mL/mÂ²    Objective   Wt Readings from Last 3 Encounters:   12/24/24 (!) 179 kg (394 lb 6.4 oz)   12/07/23 (!) 169 kg (372 lb)   09/28/23 (!) 174 kg (384 lb)            Vitals:    12/24/24 1546   BP: 130/88   BP Location: Left arm   Patient Position: Sitting   Pulse: 90   Weight: (!) 179 kg (394 lb 6.4 oz)   Height: 1.676 m (5' 6\")                Physical Exam:    GENERAL APPEARANCE: in no acute distress.  CHEST: Symmetric and non-tender.  INTEGUMENT: Skin warm and dry  HEENT: No gross abnormalities identified.No pallor or scleral icterus.  NECK: Supple, no JVD, no bruit.   NEURO/PSHCY: " Alert and oriented x3; appropriate behavior and responses and responses  LUNGS: Clear to auscultation bilaterally; normal respiratory effort.  HEART: Rate and rhythm regular with no evident murmur; no gallop appreciated.   ABDOMEN: Soft, non tender.  MUSCULOSKELETAL: No gross deformities.  EXTREMITIES: Warm  There is no edema noted.    Meds:  Current Outpatient Medications   Medication Instructions    allopurinol (ZYLOPRIM) 100 mg, Daily    amLODIPine (NORVASC) 10 mg, oral, Daily    atorvastatin (LIPITOR) 20 mg, oral, Daily    cholecalciferol (Vitamin D-3) 50 mcg (2,000 unit) capsule 1 capsule, Daily    dulaglutide (Trulicity) 3 mg/0.5 mL pen injector by subcutaneous (via wearable injector) route.    hydroCHLOROthiazide (HYDRODIURIL) 25 mg, oral, Daily    LORazepam (ATIVAN) 0.5 mg, Every 6 hours PRN    losartan (COZAAR) 100 mg, oral, Daily    metFORMIN (GLUCOPHAGE) 500 mg, 2 times daily (morning and late afternoon)    OXcarbazepine (TRILEPTAL) 150 mg, 2 times daily    valbenazine tosylate (Ingrezza) 80 mg capsule 1 capsule, Daily          No Known Allergies          LABS:    Lab Results   Component Value Date    WBC 5.7 01/07/2023    HGB 15.0 01/07/2023    HCT 43.9 01/07/2023     01/07/2023    ALT 30 01/07/2023    AST 26 01/07/2023     01/07/2023    K 3.9 01/07/2023     01/07/2023    CREATININE 1.08 01/07/2023    BUN 16 01/07/2023    CO2 27 01/07/2023    TSH 1.20 01/07/2023    HGBA1C 8.7 (H) 06/01/2023                       Patient Active Problem List    Diagnosis Date Noted    Morbid obesity (Multi) 12/24/2024    Diabetes mellitus type II, non insulin dependent (Multi) 12/07/2023    Personal history of COVID-19 12/07/2023    Medication course changed 12/07/2023    Mixed hyperlipidemia 12/07/2023    Autism ([facility]) 11/24/2023    Hypokalemia 11/24/2023    Ill-defined heart disease 11/24/2023    Primary hypertension 11/24/2023    Rhabdomyolysis 11/24/2023    Sleep apnea treated with nocturnal BiPAP  11/24/2023    Rash and other nonspecific skin eruption 04/28/2021                 Assessment:    1. Primary hypertension  Follow Up In Cardiology    Hepatic Function Panel      2. Mixed hyperlipidemia  Follow Up In Cardiology    Lipid Panel      3. Diabetes mellitus type II, non insulin dependent (Multi)  Follow Up In Cardiology    Hemoglobin A1C      4. Morbid obesity (Multi)  Follow Up In Cardiology    Hepatic Function Panel         Patient with morbid obesity obstructive sleep apnea diabetes hypertension and mixed hyperlipidemia.  Need to verify medications  Strongly consider Wegovy, unclear as to whether patient is on Trulicity right now  Hemoglobin A1c is elevated-primary addressing  Would like to help him with his weight loss, I have asked him to come with all his medications after the holiday season.  I ordered a lipid profile  Follow up :  3 weeks        Provider Attestation - Scribe documentation    All medical record entries made by the Scribe were at my direction and personally dictated by me. I have reviewed the chart and agree that the record accurately reflects my personal performance of the history, physical exam, discussion and plan.

## 2024-12-24 NOTE — PATIENT INSTRUCTIONS
follow up appointment in 4 weeks bring all your medications  Have fasting labs done soon before your next appointment        DID YOU KNOW  We have a pharmacy here in the [facility].  They can fill all prescriptions, not just cardiac medications.  Prescriptions from other pharmacies can easily be transferred to the  pharmacy by the  pharmacist on site.   pharmacies offer FREE HOME DELIVERY on medications to anywhere in [place]. They can sync your medications. Typically prescriptions can be ready in 10 - 15 minutes. If pharmacy is unable to fill your  prescription or if cost is more than your paying now the Pharmacist can easily transfer back to your Pharmacy of choice. Pharmacy phone # [phone].     Please bring all medicines, vitamins, and herbal supplements with you in original bottles to every appointment  Prescriptions will not be filled unless you are compliant with your follow up appointments or have a follow up appointment scheduled as per instruction of your physician. Refills should be requested at the time of your visit.

## 2025-01-12 DIAGNOSIS — E78.2 MIXED HYPERLIPIDEMIA: ICD-10-CM

## 2025-01-13 RX ORDER — ATORVASTATIN CALCIUM 20 MG/1
20 TABLET, FILM COATED ORAL DAILY
Qty: 90 TABLET | Refills: 0 | Status: SHIPPED | OUTPATIENT
Start: 2025-01-13

## 2025-01-23 ENCOUNTER — PREP FOR PROCEDURE (OUTPATIENT)
Dept: BARIATRICS/WEIGHT MGMT | Age: 30
End: 2025-01-23

## 2025-01-23 ENCOUNTER — OFFICE VISIT (OUTPATIENT)
Dept: BARIATRICS/WEIGHT MGMT | Age: 30
End: 2025-01-23
Payer: MEDICARE

## 2025-01-23 ENCOUNTER — APPOINTMENT (OUTPATIENT)
Dept: CARDIOLOGY | Facility: CLINIC | Age: 30
End: 2025-01-23
Payer: MEDICARE

## 2025-01-23 VITALS
SYSTOLIC BLOOD PRESSURE: 138 MMHG | WEIGHT: 315 LBS | OXYGEN SATURATION: 97 % | HEART RATE: 91 BPM | BODY MASS INDEX: 52.48 KG/M2 | DIASTOLIC BLOOD PRESSURE: 82 MMHG | HEIGHT: 65 IN

## 2025-01-23 DIAGNOSIS — E08.65 DIABETES MELLITUS DUE TO UNDERLYING CONDITION WITH HYPERGLYCEMIA, WITHOUT LONG-TERM CURRENT USE OF INSULIN (HCC): ICD-10-CM

## 2025-01-23 DIAGNOSIS — E66.9 TYPE 2 DIABETES MELLITUS WITH OBESITY (HCC): ICD-10-CM

## 2025-01-23 DIAGNOSIS — E11.69 TYPE 2 DIABETES MELLITUS WITH OBESITY (HCC): ICD-10-CM

## 2025-01-23 DIAGNOSIS — E66.01 MORBID OBESITY: Primary | ICD-10-CM

## 2025-01-23 DIAGNOSIS — E66.01 MORBID OBESITY: ICD-10-CM

## 2025-01-23 DIAGNOSIS — G47.33 OSA (OBSTRUCTIVE SLEEP APNEA): ICD-10-CM

## 2025-01-23 LAB — TSH REFLEX: 1.95 UIU/ML (ref 0.44–3.86)

## 2025-01-23 PROCEDURE — G8417 CALC BMI ABV UP PARAM F/U: HCPCS | Performed by: SURGERY

## 2025-01-23 PROCEDURE — G8427 DOCREV CUR MEDS BY ELIG CLIN: HCPCS | Performed by: SURGERY

## 2025-01-23 PROCEDURE — 99215 OFFICE O/P EST HI 40 MIN: CPT | Performed by: SURGERY

## 2025-01-23 PROCEDURE — 2022F DILAT RTA XM EVC RTNOPTHY: CPT | Performed by: SURGERY

## 2025-01-23 PROCEDURE — 3075F SYST BP GE 130 - 139MM HG: CPT | Performed by: SURGERY

## 2025-01-23 PROCEDURE — 3079F DIAST BP 80-89 MM HG: CPT | Performed by: SURGERY

## 2025-01-23 PROCEDURE — 4004F PT TOBACCO SCREEN RCVD TLK: CPT | Performed by: SURGERY

## 2025-01-23 PROCEDURE — 3046F HEMOGLOBIN A1C LEVEL >9.0%: CPT | Performed by: SURGERY

## 2025-01-23 RX ORDER — TIRZEPATIDE 2.5 MG/.5ML
INJECTION, SOLUTION SUBCUTANEOUS
COMMUNITY
Start: 2025-01-08

## 2025-01-23 NOTE — PROGRESS NOTES
Surgery Consultation    Patient Name:  :  Hospital Day:   Tomer Rivera 1995 [unfilled]     Date of Service: 2025    Subjective:      History of Present Illness:    Tomer Rivera  is a 29 y.o. male referred by Dr. Lofton for morbid obesity and diabetes.  Tomer Rivera complains of severe post prandial GERD. Tomer has a hx of schizophrenia with recent inpatient admission to Psychiatry.   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 He was started on Mounjaro this month by a \"historical provider\".  He denies nicotine or alcohol use.  He reports using CPAP for CHRISTOPHER.    Past Medical History:   Diagnosis Date    ADHD (attention deficit hyperactivity disorder) 2012    Allergic rhinitis 2012    Autism     Hypertension     Mixed hyperlipidemia 2023    Obesity (BMI 30-39.9) 2012    Sleep apnea     Past Surgical History:   Procedure Laterality Date    TONSILLECTOMY          Family History   Problem Relation Age of Onset    No Known Problems Mother     No Known Problems Father     Social History     Tobacco Use    Smoking status: Some Days     Current packs/day: 0.00     Average packs/day: 0.5 packs/day for 7.0 years (3.5 ttl pk-yrs)     Types: Cigarettes, Cigars     Start date: 2013     Last attempt to quit: 2018     Years since quittin.6    Smokeless tobacco: Never   Vaping Use    Vaping status: Never Used   Substance Use Topics    Alcohol use: Not Currently     Comment: occ    Drug use: No        Allergies: Seasonal    Review of Systems  Review of Systems - History obtained from the patient  General ROS: negative for - fever, malaise, or weight loss  ENT ROS: negative for - headaches, nasal congestion, or sore throat  Hematological and Lymphatic ROS: No bruising or easy bleeding.  Respiratory ROS: no cough, shortness of breath, or wheezing  Cardiovascular ROS: no chest pain or dyspnea on exertion  Gastrointestinal ROS: Negative for

## 2025-01-23 NOTE — PATIENT INSTRUCTIONS
Make the following appointments:    Dietician, Leigha Goodrich.  Psychologist, Dr. Francis Allan.  Schedule screening upper endoscopy with Dr. Dumont. No aspirin, ibuprofen or other non-steroidal anti-inflammatory drugs (NSAIDs) 7 days prior to surgery and endoscopy    No food or drink six hours prior to surgery.      THE BIG FOUR GUIDELINES:  1.  Count calories!  People who count calories eat less.  Use the daily plate or other apps for your smart phone or computer.  The more you count the more of an expert you will become and will get easier and easier.  If you are trying to lose weight and exercising a lot, keep calories to around the thousand to 1200 a day.  If you are not exercising consistently try to limit them to around 800 a day.    2.  Separate liquids and solids.  Wait 30 minutes to an hour after you eat before drinking liquids.  This will reduce the total amount of food you eat in the meal.    3.  Exercise!  You need up to 5 hours a week with a combination of cardio and resistance or weight training in order to keep excess body fat off.    4.  Sleep!   Try to get 7 or more hours of sleep a night.  If you have obstructive sleep apnea definitely use your CPAP machine.    THE RULE OF 20'S:  For every meal, chew each bite 20 seconds.  Then put the fork down and wait 20 seconds after you swallow before picking up the fork again.  Each meal should take at least 20 minutes so your brain can register that you are full.

## 2025-02-05 ENCOUNTER — APPOINTMENT (OUTPATIENT)
Dept: CARDIOLOGY | Facility: CLINIC | Age: 30
End: 2025-02-05
Payer: MEDICARE

## 2025-02-11 ENCOUNTER — APPOINTMENT (OUTPATIENT)
Dept: CARDIOLOGY | Facility: CLINIC | Age: 30
End: 2025-02-11
Payer: MEDICARE

## 2025-02-11 VITALS
SYSTOLIC BLOOD PRESSURE: 132 MMHG | WEIGHT: 315 LBS | BODY MASS INDEX: 50.62 KG/M2 | HEART RATE: 98 BPM | HEIGHT: 66 IN | DIASTOLIC BLOOD PRESSURE: 66 MMHG

## 2025-02-11 DIAGNOSIS — E78.2 MIXED HYPERLIPIDEMIA: ICD-10-CM

## 2025-02-11 DIAGNOSIS — G47.30 SLEEP APNEA TREATED WITH NOCTURNAL BIPAP: Primary | ICD-10-CM

## 2025-02-11 DIAGNOSIS — I10 PRIMARY HYPERTENSION: ICD-10-CM

## 2025-02-11 DIAGNOSIS — E66.01 MORBID OBESITY (MULTI): ICD-10-CM

## 2025-02-11 DIAGNOSIS — E11.9 DIABETES MELLITUS TYPE II, NON INSULIN DEPENDENT (MULTI): ICD-10-CM

## 2025-02-11 PROCEDURE — 3008F BODY MASS INDEX DOCD: CPT | Performed by: INTERNAL MEDICINE

## 2025-02-11 PROCEDURE — 4010F ACE/ARB THERAPY RXD/TAKEN: CPT | Performed by: INTERNAL MEDICINE

## 2025-02-11 PROCEDURE — G2211 COMPLEX E/M VISIT ADD ON: HCPCS | Performed by: INTERNAL MEDICINE

## 2025-02-11 PROCEDURE — 3078F DIAST BP <80 MM HG: CPT | Performed by: INTERNAL MEDICINE

## 2025-02-11 PROCEDURE — 99213 OFFICE O/P EST LOW 20 MIN: CPT | Performed by: INTERNAL MEDICINE

## 2025-02-11 PROCEDURE — 3075F SYST BP GE 130 - 139MM HG: CPT | Performed by: INTERNAL MEDICINE

## 2025-02-11 RX ORDER — AMLODIPINE BESYLATE 10 MG/1
10 TABLET ORAL DAILY
Qty: 90 TABLET | Refills: 3 | Status: SHIPPED | OUTPATIENT
Start: 2025-02-11 | End: 2026-02-11

## 2025-02-11 RX ORDER — TIRZEPATIDE 5 MG/.5ML
2.5 INJECTION, SOLUTION SUBCUTANEOUS
COMMUNITY
Start: 2025-01-27

## 2025-02-11 RX ORDER — ATORVASTATIN CALCIUM 20 MG/1
20 TABLET, FILM COATED ORAL DAILY
Qty: 90 TABLET | Refills: 0 | Status: SHIPPED | OUTPATIENT
Start: 2025-02-11

## 2025-02-11 RX ORDER — HYDROCHLOROTHIAZIDE 25 MG/1
25 TABLET ORAL DAILY
Qty: 90 TABLET | Refills: 3 | Status: SHIPPED | OUTPATIENT
Start: 2025-02-11 | End: 2026-02-11

## 2025-02-11 RX ORDER — RISPERIDONE 0.5 MG/1
1 TABLET ORAL
COMMUNITY
Start: 2025-02-06

## 2025-02-11 RX ORDER — METOPROLOL SUCCINATE 50 MG/1
50 TABLET, EXTENDED RELEASE ORAL DAILY
Qty: 90 TABLET | Refills: 3 | Status: SHIPPED | OUTPATIENT
Start: 2025-02-11

## 2025-02-11 RX ORDER — LOSARTAN POTASSIUM 100 MG/1
100 TABLET ORAL DAILY
Qty: 90 TABLET | Refills: 3 | Status: SHIPPED | OUTPATIENT
Start: 2025-02-11 | End: 2026-02-11

## 2025-02-11 RX ORDER — TIRZEPATIDE 2.5 MG/.5ML
INJECTION, SOLUTION SUBCUTANEOUS WEEKLY
COMMUNITY

## 2025-02-11 RX ORDER — METOPROLOL SUCCINATE 50 MG/1
50 TABLET, EXTENDED RELEASE ORAL DAILY
COMMUNITY
End: 2025-02-11 | Stop reason: SDUPTHER

## 2025-02-11 NOTE — PATIENT INSTRUCTIONS
Follow up 6 months    DID YOU KNOW  We have a pharmacy here in the [facility].  They can fill all prescriptions, not just cardiac medications.  Prescriptions from other pharmacies can easily be transferred to the  pharmacy by the  pharmacist on site.   pharmacies offer FREE HOME DELIVERY on medications to anywhere in [place]. They can sync your medications. Typically prescriptions can be ready in 10 - 15 minutes. If pharmacy is unable to fill your  prescription or if cost is more than your paying now the Pharmacist can easily transfer back to your Pharmacy of choice. Pharmacy phone # [phone].     Please bring all medicines, vitamins, and herbal supplements with you in original bottles to every appointment!!!!    Prescriptions will not be filled unless you are compliant with your follow up appointments or have a follow up appointment scheduled as per instruction of your physician. Refills should be requested at the time of your visit.

## 2025-02-11 NOTE — PROGRESS NOTES
"Most recently seen 12/24/2024 at which time patient did not know his medications, this appointment was scheduled so that he could bring in his medications today for review and adjustment.  He fell asleep in the office.  Says he just returned from work, and he is very tired reports compliance to CPAP therapy  Subjective :   Interval review of systems is negative for chest discomfort pressure tightness heaviness palpitations lightheadedness orthopnea paroxysmal nocturnal dyspnea dependent edema or claudication TIA or CVA type symptoms or bleeding diathesis  He forgot his medications, but was able to provide a list from the pharmacy and I reviewed that.    12 point ROS is negative or non contributory except as noted.   History so Far :  Antonio has multiple comorbidities.  His BMI is in excess of 60.  He reports obstructive sleep apnea and says he uses CPAP therapy regularly  He is on Mounjaro.  Dose has been increased.  Says he eats 2 meals, does not eat dinner, only drinks water.  No symptoms of hypoglycemia.      Objective   Wt Readings from Last 3 Encounters:   02/11/25 (!) 179 kg (395 lb 9.6 oz)   12/24/24 (!) 179 kg (394 lb 6.4 oz)   12/07/23 (!) 169 kg (372 lb)            Vitals:    02/11/25 1600   BP: 132/66   BP Location: Left arm   Patient Position: Sitting   Pulse: 98   Weight: (!) 179 kg (395 lb 9.6 oz)   Height: 1.676 m (5' 6\")                Physical Exam:    GENERAL APPEARANCE: in no acute distress.  CHEST: Symmetric and non-tender.  INTEGUMENT: Skin warm and dry  HEENT: No gross abnormalities identified.No pallor or scleral icterus.  NECK: Supple, no JVD, no bruit.   NEURO/PSHCY: Alert and oriented x3; appropriate behavior and responses and responses  LUNGS: Clear to auscultation bilaterally; normal respiratory effort.  HEART: Rate and rhythm regular with no evident murmur; no gallop appreciated.   ABDOMEN: Soft, non tender.  MUSCULOSKELETAL: No gross deformities.  EXTREMITIES: Warm  There is no edema " noted.    Meds:  Current Outpatient Medications   Medication Instructions    amLODIPine (NORVASC) 10 mg, oral, Daily    atorvastatin (LIPITOR) 20 mg, oral, Daily    dulaglutide (Trulicity) 3 mg/0.5 mL pen injector by subcutaneous (via wearable injector) route.    hydroCHLOROthiazide (HYDRODIURIL) 25 mg, oral, Daily    losartan (COZAAR) 100 mg, oral, Daily    metFORMIN (GLUCOPHAGE) 750 mg, oral, Daily with breakfast    metoprolol succinate XL (TOPROL-XL) 50 mg, oral, Daily, Do not crush or chew.    Mounjaro 2.5 mg/0.5 mL pen injector Weekly    Mounjaro 2.5 mg, subcutaneous, Every 7 days    OXcarbazepine (TRILEPTAL) 150 mg, 2 times daily    risperiDONE (RisperDAL) 0.5 mg tablet 1 tablet, Every 12 hours scheduled (0630,1830)          No Known Allergies          LABS:    Lab Results   Component Value Date    WBC 5.7 01/07/2023    HGB 15.0 01/07/2023    HCT 43.9 01/07/2023     01/07/2023    ALT 30 01/07/2023    AST 26 01/07/2023     01/07/2023    K 3.9 01/07/2023     01/07/2023    CREATININE 1.08 01/07/2023    BUN 16 01/07/2023    CO2 27 01/07/2023    TSH 1.20 01/07/2023    HGBA1C 8.7 (H) 06/01/2023                       Patient Active Problem List    Diagnosis Date Noted    Morbid obesity (Multi) 12/24/2024    Diabetes mellitus type II, non insulin dependent (Multi) 12/07/2023    Personal history of COVID-19 12/07/2023    Medication course changed 12/07/2023    Mixed hyperlipidemia 12/07/2023    Autism ([facility]) 11/24/2023    Hypokalemia 11/24/2023    Ill-defined heart disease 11/24/2023    Primary hypertension 11/24/2023    Rhabdomyolysis 11/24/2023    Sleep apnea treated with nocturnal BiPAP 11/24/2023    Rash and other nonspecific skin eruption 04/28/2021                 Assessment:    1. Primary hypertension  Follow Up In Cardiology    Follow Up In Cardiology    amLODIPine (Norvasc) 10 mg tablet    hydroCHLOROthiazide (HYDRODiuril) 25 mg tablet    losartan (Cozaar) 100 mg tablet    metoprolol  succinate XL (Toprol-XL) 50 mg 24 hr tablet      2. Mixed hyperlipidemia  Follow Up In Cardiology    Follow Up In Cardiology    atorvastatin (Lipitor) 20 mg tablet      3. Diabetes mellitus type II, non insulin dependent (Multi)  Follow Up In Cardiology    Follow Up In Cardiology      4. Morbid obesity (Multi)  Follow Up In Cardiology    Follow Up In Cardiology         Medications were reviewed.  Medications were renewed.  Patient will go today to get his blood work that was ordered at last visit.  It will be a nonfasting sample.  Continue close follow-up with primary care or endocrinology and continue Wegovy.  Follow up : 6 months        Provider Attestation - Scribe documentation    All medical record entries made by the Scribe were at my direction and personally dictated by me. I have reviewed the chart and agree that the record accurately reflects my personal performance of the history, physical exam, discussion and plan.

## 2025-02-12 LAB
ALBUMIN SERPL-MCNC: 4.9 G/DL (ref 3.6–5.1)
ALBUMIN/GLOB SERPL: 1.8 (CALC) (ref 1–2.5)
ALP SERPL-CCNC: 71 U/L (ref 36–130)
ALT SERPL-CCNC: 21 U/L (ref 9–46)
AST SERPL-CCNC: 18 U/L (ref 10–40)
BILIRUB DIRECT SERPL-MCNC: 0.3 MG/DL
BILIRUB INDIRECT SERPL-MCNC: 0.8 MG/DL (CALC) (ref 0.2–1.2)
BILIRUB SERPL-MCNC: 1.1 MG/DL (ref 0.2–1.2)
CHOLEST SERPL-MCNC: 99 MG/DL
CHOLEST/HDLC SERPL: 2.5 (CALC)
EST. AVERAGE GLUCOSE BLD GHB EST-MCNC: 166 MG/DL
EST. AVERAGE GLUCOSE BLD GHB EST-SCNC: 9.2 MMOL/L
GLOBULIN SER CALC-MCNC: 2.7 G/DL (CALC) (ref 1.9–3.7)
HBA1C MFR BLD: 7.4 % OF TOTAL HGB
HDLC SERPL-MCNC: 39 MG/DL
LDLC SERPL CALC-MCNC: 44 MG/DL (CALC)
NONHDLC SERPL-MCNC: 60 MG/DL (CALC)
PROT SERPL-MCNC: 7.6 G/DL (ref 6.1–8.1)
TRIGL SERPL-MCNC: 75 MG/DL

## 2025-02-13 ENCOUNTER — OFFICE VISIT (OUTPATIENT)
Dept: PULMONOLOGY | Age: 30
End: 2025-02-13
Payer: MEDICARE

## 2025-02-13 VITALS
BODY MASS INDEX: 52.48 KG/M2 | TEMPERATURE: 98.2 F | HEART RATE: 76 BPM | DIASTOLIC BLOOD PRESSURE: 78 MMHG | WEIGHT: 315 LBS | SYSTOLIC BLOOD PRESSURE: 138 MMHG | OXYGEN SATURATION: 98 % | HEIGHT: 65 IN

## 2025-02-13 DIAGNOSIS — G47.10 HYPERSOMNIA: ICD-10-CM

## 2025-02-13 DIAGNOSIS — G47.33 OSA ON CPAP: Primary | ICD-10-CM

## 2025-02-13 DIAGNOSIS — E66.9 OBESITY, UNSPECIFIED CLASS, UNSPECIFIED OBESITY TYPE, UNSPECIFIED WHETHER SERIOUS COMORBIDITY PRESENT: ICD-10-CM

## 2025-02-13 PROCEDURE — 4004F PT TOBACCO SCREEN RCVD TLK: CPT | Performed by: INTERNAL MEDICINE

## 2025-02-13 PROCEDURE — 3075F SYST BP GE 130 - 139MM HG: CPT | Performed by: INTERNAL MEDICINE

## 2025-02-13 PROCEDURE — 3078F DIAST BP <80 MM HG: CPT | Performed by: INTERNAL MEDICINE

## 2025-02-13 PROCEDURE — G8417 CALC BMI ABV UP PARAM F/U: HCPCS | Performed by: INTERNAL MEDICINE

## 2025-02-13 PROCEDURE — G8428 CUR MEDS NOT DOCUMENT: HCPCS | Performed by: INTERNAL MEDICINE

## 2025-02-13 PROCEDURE — 99213 OFFICE O/P EST LOW 20 MIN: CPT | Performed by: INTERNAL MEDICINE

## 2025-02-13 NOTE — PROGRESS NOTES
PATIENT VISIT-PULMONARY/SLEEP    2/13/2025     REFERRING PHYSICIAN:  Violet Moses APRN - NP     REASON FOR REFERRAL: CHRISTOPHER    HPI:     Tomer Rivera is a 29 y.o. male who was referred to sleep clinic for CHRISTOPHER..     Here for second opinion.  Last time he was seen in 2021.  Has been on BiPAP with an inspiratory pressure of 14 and expiratory pressure of 10 cm H2O.  Has not been using the machine for the last 10 months.  Has been having issues with the mask and wants a different mask.  He currently has fullface mask but asking for nasal mask.  Of note, he gained 70 pounds since his sleep study.      He is tired and sleepy during the day and he dozes off easily.      3/26/24:    Comes back for follow-up.  Underwent BiPAP titration study.  BiPAP with an inspiratory pressure of 16 and expiratory pressure of 12 was adequate.  Did well during the test.  His sleep efficiency was good.  Still trying to use his old machine, however his hose is leaking and his mask is leaking.  He is set up to receive the new machine in April.        7/1/24:    Received BiPAP machine.  Patient has been compliant very well.  Inspiratory pressure 16 expiratory pressure 12 cm H2O.  Averaging less than 2 hours.  Has a high leak.  He tells me that he has a lot of work to do with his kids and that \" his wife usually can well to take care of the kids\".  He feels better when he uses the machine couple hours.      2/13/25:    Comes for follow up.  Has not been very compliant with PAP.  He was noted to be sleepy at a cardiology office waiting room and was advised to come back and see us.  Has not been consistent with his appointment with weight management center.  His compliance is poor and maybe uses the machine couple times a months.          Past Medical History   Past Medical History:   Diagnosis Date    ADHD (attention deficit hyperactivity disorder) 04/04/2012    Allergic rhinitis 04/04/2012    Autism     Hypertension     Mixed

## 2025-02-20 ENCOUNTER — HOSPITAL ENCOUNTER (EMERGENCY)
Age: 30
Discharge: HOME | End: 2025-02-20
Payer: MEDICARE

## 2025-02-20 ENCOUNTER — APPOINTMENT (OUTPATIENT)
Dept: GENERAL RADIOLOGY | Age: 30
End: 2025-02-20
Payer: MEDICARE

## 2025-02-20 VITALS
SYSTOLIC BLOOD PRESSURE: 169 MMHG | RESPIRATION RATE: 19 BRPM | HEART RATE: 88 BPM | TEMPERATURE: 98 F | OXYGEN SATURATION: 99 % | DIASTOLIC BLOOD PRESSURE: 89 MMHG

## 2025-02-20 DIAGNOSIS — B34.9 VIRAL ILLNESS: ICD-10-CM

## 2025-02-20 DIAGNOSIS — R09.81 NASAL CONGESTION: Primary | ICD-10-CM

## 2025-02-20 LAB
EKG ATRIAL RATE: 86 BPM
EKG P AXIS: 15 DEGREES
EKG P-R INTERVAL: 158 MS
EKG Q-T INTERVAL: 380 MS
EKG QRS DURATION: 94 MS
EKG QTC CALCULATION (BAZETT): 454 MS
EKG R AXIS: 62 DEGREES
EKG T AXIS: 34 DEGREES
EKG VENTRICULAR RATE: 86 BPM
INFLUENZA A BY PCR: NEGATIVE
INFLUENZA B BY PCR: NEGATIVE
RSV BY PCR: NEGATIVE
SARS-COV-2 RDRP RESP QL NAA+PROBE: NOT DETECTED

## 2025-02-20 PROCEDURE — 71045 X-RAY EXAM CHEST 1 VIEW: CPT

## 2025-02-20 PROCEDURE — 87502 INFLUENZA DNA AMP PROBE: CPT

## 2025-02-20 PROCEDURE — 99285 EMERGENCY DEPT VISIT HI MDM: CPT

## 2025-02-20 PROCEDURE — 93005 ELECTROCARDIOGRAM TRACING: CPT

## 2025-02-20 PROCEDURE — 93010 ELECTROCARDIOGRAM REPORT: CPT | Performed by: INTERNAL MEDICINE

## 2025-02-20 PROCEDURE — 2500000003 HC RX 250 WO HCPCS

## 2025-02-20 PROCEDURE — 6360000002 HC RX W HCPCS

## 2025-02-20 PROCEDURE — 87634 RSV DNA/RNA AMP PROBE: CPT

## 2025-02-20 PROCEDURE — 96375 TX/PRO/DX INJ NEW DRUG ADDON: CPT

## 2025-02-20 PROCEDURE — 96366 THER/PROPH/DIAG IV INF ADDON: CPT

## 2025-02-20 PROCEDURE — 96365 THER/PROPH/DIAG IV INF INIT: CPT

## 2025-02-20 PROCEDURE — 87635 SARS-COV-2 COVID-19 AMP PRB: CPT

## 2025-02-20 RX ORDER — MAGNESIUM SULFATE IN WATER 40 MG/ML
2000 INJECTION, SOLUTION INTRAVENOUS ONCE
Status: COMPLETED | OUTPATIENT
Start: 2025-02-20 | End: 2025-02-20

## 2025-02-20 RX ORDER — FLUTICASONE PROPIONATE 50 MCG
2 SPRAY, SUSPENSION (ML) NASAL DAILY PRN
Qty: 16 G | Refills: 0 | Status: SHIPPED | OUTPATIENT
Start: 2025-02-20

## 2025-02-20 RX ORDER — GUAIFENESIN 600 MG/1
600 TABLET, EXTENDED RELEASE ORAL 2 TIMES DAILY PRN
Qty: 30 TABLET | Refills: 0 | Status: SHIPPED | OUTPATIENT
Start: 2025-02-20 | End: 2025-03-07

## 2025-02-20 RX ADMIN — MAGNESIUM SULFATE HEPTAHYDRATE 2000 MG: 40 INJECTION, SOLUTION INTRAVENOUS at 04:02

## 2025-02-20 RX ADMIN — METHYLPREDNISOLONE SODIUM SUCCINATE 125 MG: 125 INJECTION INTRAMUSCULAR; INTRAVENOUS at 04:06

## 2025-02-20 ASSESSMENT — ENCOUNTER SYMPTOMS
DIARRHEA: 0
NAUSEA: 0
SHORTNESS OF BREATH: 0
ABDOMINAL PAIN: 0
VOMITING: 0
PHOTOPHOBIA: 0
COUGH: 0

## 2025-02-20 ASSESSMENT — PAIN - FUNCTIONAL ASSESSMENT: PAIN_FUNCTIONAL_ASSESSMENT: NONE - DENIES PAIN

## 2025-02-20 NOTE — ED PROVIDER NOTES
[facility] EMERGENCY DEPARTMENT  EMERGENCY DEPARTMENT ENCOUNTER      Pt Name: Tomer Rivera  MRN: [id number]  Birthdate 1995  Date of evaluation: 2/20/2025  Provider: ZABRINA Morel  3:22 AM EST      CHIEF COMPLAINT       Chief Complaint   Patient presents with    Shortness of Breath     Hx asthma          HISTORY OF PRESENT ILLNESS   (Location/Symptom, Timing/Onset, Context/Setting, Quality, Duration, Modifying Factors, Severity)  Note limiting factors.   Tomer Rivera is a 29 y.o. male who presents to the emergency department with PMHx ADHD, allergic rhinitis, autism, generalized anxiety disorder, bipolar 1 disorder, schizoaffective disorder, asthma, hyperlipidemia, panic disorder, rhabdomyolysis, type 2 diabetes.  Patient presents to the ED for evaluation of nasal congestion.  Patient feels as though he cannot breathe.  Attributes it primarily to his nose being congested.  This started 2 days ago.  Patient is concerned he might have the flu.  States he is worried it is going around.  Patient denies acute cough.  No fever.  No chest pain or tightness.  Denies significant shortness of breath.  Patient states he has history of asthma.  Has not been wheezing or taking any treatments at home.  He denies any sick contacts.  Denies nausea, vomiting, fatigue, lethargy, myalgias, decreased appetite or activity.  Patient quite anxious on arrival.  Presents via EMS.  No prehospital interventions.    HPI    Nursing Notes were reviewed.    REVIEW OF SYSTEMS    (2-9 systems for level 4, 10 or more for level 5)     Review of Systems   Constitutional:  Negative for chills and fever.   HENT:  Positive for congestion.    Eyes:  Negative for photophobia.   Respiratory:  Negative for cough and shortness of breath.    Cardiovascular:  Negative for chest pain.   Gastrointestinal:  Negative for abdominal pain, diarrhea, nausea and vomiting.   Genitourinary:  Negative for difficulty urinating.   Musculoskeletal:  Negative

## 2025-02-25 ENCOUNTER — ANESTHESIA EVENT (OUTPATIENT)
Dept: ENDOSCOPY | Age: 30
End: 2025-02-25
Payer: MEDICARE

## 2025-02-25 RX ORDER — SODIUM CHLORIDE 0.9 % (FLUSH) 0.9 %
5-40 SYRINGE (ML) INJECTION EVERY 12 HOURS SCHEDULED
Status: CANCELLED | OUTPATIENT
Start: 2025-02-25

## 2025-02-25 RX ORDER — SODIUM CHLORIDE 9 MG/ML
INJECTION, SOLUTION INTRAVENOUS CONTINUOUS
Status: CANCELLED | OUTPATIENT
Start: 2025-02-25

## 2025-02-25 RX ORDER — SODIUM CHLORIDE 9 MG/ML
INJECTION, SOLUTION INTRAVENOUS PRN
Status: CANCELLED | OUTPATIENT
Start: 2025-02-25

## 2025-02-25 RX ORDER — SODIUM CHLORIDE 0.9 % (FLUSH) 0.9 %
5-40 SYRINGE (ML) INJECTION PRN
Status: CANCELLED | OUTPATIENT
Start: 2025-02-25

## 2025-02-25 ASSESSMENT — ENCOUNTER SYMPTOMS: SHORTNESS OF BREATH: 1

## 2025-02-25 NOTE — ANESTHESIA PRE PROCEDURE
Results   Component Value Date/Time    COVID19 Not Detected 02/20/2025 03:38 AM    COVID19 Not Detected 08/01/2023 01:40 AM           Anesthesia Evaluation  Patient summary reviewed and Nursing notes reviewed   no history of anesthetic complications:   Airway: Mallampati: II  TM distance: >3 FB   Neck ROM: full  Mouth opening: > = 3 FB   Dental: normal exam         Pulmonary:normal exam    (+)   shortness of breath:   sleep apnea:                                  Cardiovascular:Negative CV ROS  Exercise tolerance: good (>4 METS)        ECG reviewed               Beta Blocker:  Not on Beta Blocker         Neuro/Psych:   (+) neuromuscular disease:, psychiatric history:            GI/Hepatic/Renal:   (+) morbid obesity          Endo/Other:    (+) Diabetes.          Pt had PAT visit.       Abdominal:             Vascular: negative vascular ROS.         Other Findings:             Anesthesia Plan      MAC     ASA 3       Induction: intravenous.          Plan discussed with attending and surgical team.                    Livan Santiago, APRN - CRNA   2/25/2025

## 2025-02-26 ENCOUNTER — ANESTHESIA (OUTPATIENT)
Dept: ENDOSCOPY | Age: 30
End: 2025-02-26
Payer: MEDICARE

## 2025-02-26 ENCOUNTER — HOSPITAL ENCOUNTER (OUTPATIENT)
Age: 30
Setting detail: OUTPATIENT SURGERY
Discharge: HOME | End: 2025-02-26
Attending: SURGERY | Admitting: SURGERY
Payer: MEDICARE

## 2025-02-26 VITALS
HEIGHT: 67 IN | HEART RATE: 90 BPM | WEIGHT: 315 LBS | BODY MASS INDEX: 49.44 KG/M2 | OXYGEN SATURATION: 97 % | SYSTOLIC BLOOD PRESSURE: 132 MMHG | RESPIRATION RATE: 20 BRPM | DIASTOLIC BLOOD PRESSURE: 70 MMHG | TEMPERATURE: 97.3 F

## 2025-02-26 DIAGNOSIS — E66.01 MORBID OBESITY: ICD-10-CM

## 2025-02-26 LAB
GLUCOSE BLD-MCNC: 135 MG/DL (ref 70–99)
PERFORMED ON: ABNORMAL

## 2025-02-26 PROCEDURE — 99213 OFFICE O/P EST LOW 20 MIN: CPT | Performed by: SURGERY

## 2025-02-26 PROCEDURE — 3609017100 HC EGD: Performed by: SURGERY

## 2025-02-26 PROCEDURE — 3700000000 HC ANESTHESIA ATTENDED CARE: Performed by: SURGERY

## 2025-02-26 PROCEDURE — 2580000003 HC RX 258

## 2025-02-26 PROCEDURE — 2709999900 HC NON-CHARGEABLE SUPPLY: Performed by: SURGERY

## 2025-02-26 PROCEDURE — 6360000002 HC RX W HCPCS: Performed by: REGISTERED NURSE

## 2025-02-26 PROCEDURE — 7100000010 HC PHASE II RECOVERY - FIRST 15 MIN: Performed by: SURGERY

## 2025-02-26 PROCEDURE — 7100000011 HC PHASE II RECOVERY - ADDTL 15 MIN: Performed by: SURGERY

## 2025-02-26 PROCEDURE — 88342 IMHCHEM/IMCYTCHM 1ST ANTB: CPT

## 2025-02-26 PROCEDURE — 2500000003 HC RX 250 WO HCPCS: Performed by: SURGERY

## 2025-02-26 PROCEDURE — 88305 TISSUE EXAM BY PATHOLOGIST: CPT

## 2025-02-26 RX ORDER — SODIUM CHLORIDE 9 MG/ML
INJECTION, SOLUTION INTRAVENOUS
Status: COMPLETED
Start: 2025-02-26 | End: 2025-02-26

## 2025-02-26 RX ORDER — GLYCOPYRROLATE 1 MG/5 ML
SYRINGE (ML) INTRAVENOUS
Status: DISCONTINUED | OUTPATIENT
Start: 2025-02-26 | End: 2025-02-26 | Stop reason: SDUPTHER

## 2025-02-26 RX ORDER — PROPOFOL 10 MG/ML
INJECTION, EMULSION INTRAVENOUS
Status: DISCONTINUED | OUTPATIENT
Start: 2025-02-26 | End: 2025-02-26 | Stop reason: SDUPTHER

## 2025-02-26 RX ORDER — LIDOCAINE HYDROCHLORIDE 20 MG/ML
INJECTION, SOLUTION INFILTRATION; PERINEURAL
Status: DISCONTINUED | OUTPATIENT
Start: 2025-02-26 | End: 2025-02-26 | Stop reason: SDUPTHER

## 2025-02-26 RX ORDER — SODIUM CHLORIDE 9 MG/ML
INJECTION, SOLUTION INTRAVENOUS PRN
Status: DISCONTINUED | OUTPATIENT
Start: 2025-02-26 | End: 2025-02-26 | Stop reason: HOSPADM

## 2025-02-26 RX ORDER — OMEPRAZOLE 20 MG/1
20 CAPSULE, DELAYED RELEASE ORAL
Qty: 90 CAPSULE | Refills: 2 | Status: SHIPPED | OUTPATIENT
Start: 2025-02-26

## 2025-02-26 RX ORDER — SODIUM CHLORIDE 0.9 % (FLUSH) 0.9 %
5-40 SYRINGE (ML) INJECTION PRN
Status: DISCONTINUED | OUTPATIENT
Start: 2025-02-26 | End: 2025-02-26 | Stop reason: HOSPADM

## 2025-02-26 RX ORDER — SODIUM CHLORIDE 9 MG/ML
INJECTION, SOLUTION INTRAVENOUS CONTINUOUS
Status: DISCONTINUED | OUTPATIENT
Start: 2025-02-26 | End: 2025-02-26 | Stop reason: HOSPADM

## 2025-02-26 RX ORDER — SODIUM CHLORIDE 0.9 % (FLUSH) 0.9 %
5-40 SYRINGE (ML) INJECTION EVERY 12 HOURS SCHEDULED
Status: DISCONTINUED | OUTPATIENT
Start: 2025-02-26 | End: 2025-02-26 | Stop reason: HOSPADM

## 2025-02-26 RX ADMIN — SODIUM CHLORIDE: 0.9 INJECTION, SOLUTION INTRAVENOUS at 08:17

## 2025-02-26 RX ADMIN — PROPOFOL 300 MG: 10 INJECTION, EMULSION INTRAVENOUS at 08:42

## 2025-02-26 RX ADMIN — LIDOCAINE HYDROCHLORIDE 60 MG: 20 INJECTION, SOLUTION INFILTRATION; PERINEURAL at 08:42

## 2025-02-26 RX ADMIN — Medication 0.2 MG: at 08:42

## 2025-02-26 RX ADMIN — PROPOFOL 50 MG: 10 INJECTION, EMULSION INTRAVENOUS at 08:43

## 2025-02-26 RX ADMIN — SODIUM CHLORIDE: 9 INJECTION, SOLUTION INTRAVENOUS at 08:17

## 2025-02-26 ASSESSMENT — PAIN - FUNCTIONAL ASSESSMENT
PAIN_FUNCTIONAL_ASSESSMENT: 0-10
PAIN_FUNCTIONAL_ASSESSMENT: 0-10

## 2025-02-26 NOTE — H&P
History and Physical    Patient Name:  :  Hospital Day:   Tomer Rivera 1995  LOS: 0 days      Date of Service: 2025    Subjective:      History of Present Illness:  Tomer Rivera  is a 29 y.o. male referred by Dr. Lofton for morbid obesity and diabetes.  Tomer Rivera complains of severe post prandial GERD. Tomer has a hx of schizophrenia with recent inpatient admission to Psychiatry.   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 He was started on Mounjaro this month by a \"historical provider\".  He denies nicotine or alcohol use.  He reports using CPAP for CHRISTOPHER.    Tomer c/o severe heartburn post-prandially.    Past Medical History:   Diagnosis Date    ADHD (attention deficit hyperactivity disorder) 2012    Allergic rhinitis 2012    Autism     Hypertension     Mixed hyperlipidemia 2023    Obesity (BMI 30-39.9) 2012    Sleep apnea     Past Surgical History:   Procedure Laterality Date    TONSILLECTOMY          Family History   Problem Relation Age of Onset    No Known Problems Mother     No Known Problems Father     Social History     Tobacco Use    Smoking status: Former     Current packs/day: 0.00     Average packs/day: 0.5 packs/day for 7.0 years (3.5 ttl pk-yrs)     Types: Cigarettes, Cigars     Start date: 2013     Quit date: 2018     Years since quittin.7    Smokeless tobacco: Never   Vaping Use    Vaping status: Never Used   Substance Use Topics    Alcohol use: Yes     Comment: occ    Drug use: Yes     Types: Marijuana (Weed)        Allergies: Seasonal    Review of Systems  Review of Systems - History obtained from the patient  General ROS: negative for - fever, malaise, or weight loss  ENT ROS: negative for - headaches, nasal congestion, or sore throat  Hematological and Lymphatic ROS: No bruising or easy bleeding.  Respiratory ROS: no cough, shortness of breath, or wheezing  Cardiovascular ROS: no chest pain or

## 2025-02-26 NOTE — ANESTHESIA POSTPROCEDURE EVALUATION
Department of Anesthesiology  Postprocedure Note    Patient: Tomer Rivera  MRN: [id number]  YOB: 1995  Date of evaluation: 2/26/2025    Procedure Summary       Date: 02/26/25 Room / Location: [facility] OR 03 / [facility]    Anesthesia Start: 0836 Anesthesia Stop: 0849    Procedure: EGD ESOPHAGOGASTRODUODENOSCOPY WITH BIOPSIES Diagnosis:       Morbid obesity      (Morbid obesity [E66.01])    Surgeons: Joshua Dumont MD Responsible Provider: Livan Santiago APRN - CRNA    Anesthesia Type: MAC ASA Status: 3            Anesthesia Type: No value filed.    Andrew Phase I: Andrew Score: 10    Andrew Phase II: Andrew Score: 9    Anesthesia Post Evaluation    Patient location during evaluation: bedside  Patient participation: complete - patient participated  Level of consciousness: awake and awake and alert  Airway patency: patent  Nausea & Vomiting: no nausea and no vomiting  Cardiovascular status: blood pressure returned to baseline and hemodynamically stable  Respiratory status: acceptable  Hydration status: euvolemic  Pain management: adequate        No notable events documented.

## 2025-02-26 NOTE — PROGRESS NOTES
CLINICAL PHARMACY NOTE: MEDS TO BEDS    Total # of Prescriptions Filled: 1   The following medications were delivered to the patient:  Omeprazole 20 mg Cap    Additional Documentation:

## 2025-03-11 DIAGNOSIS — A04.8 H. PYLORI INFECTION: Primary | ICD-10-CM

## 2025-03-11 RX ORDER — METRONIDAZOLE 500 MG/1
500 TABLET ORAL 2 TIMES DAILY
Qty: 14 TABLET | Refills: 0 | Status: SHIPPED | OUTPATIENT
Start: 2025-03-11 | End: 2025-03-18

## 2025-03-11 NOTE — PROGRESS NOTES
I prescribed Bismuth, Augmentin, Flagyl,(already on omeprazole) for H. Pylori treatment.  I called Mitzi's Mother and informed her of the infection and prescriptions.    Maximo Dumont MD, FACS, [facility]

## 2025-04-07 ENCOUNTER — OFFICE VISIT (OUTPATIENT)
Age: 30
End: 2025-04-07
Payer: MEDICARE

## 2025-04-07 VITALS
OXYGEN SATURATION: 96 % | TEMPERATURE: 96.4 F | HEIGHT: 67 IN | DIASTOLIC BLOOD PRESSURE: 88 MMHG | BODY MASS INDEX: 49.44 KG/M2 | SYSTOLIC BLOOD PRESSURE: 138 MMHG | WEIGHT: 315 LBS | HEART RATE: 97 BPM | RESPIRATION RATE: 20 BRPM

## 2025-04-07 DIAGNOSIS — G47.33 OSA ON CPAP: Primary | ICD-10-CM

## 2025-04-07 DIAGNOSIS — E66.9 OBESITY, UNSPECIFIED CLASS, UNSPECIFIED OBESITY TYPE, UNSPECIFIED WHETHER SERIOUS COMORBIDITY PRESENT: ICD-10-CM

## 2025-04-07 PROCEDURE — G8427 DOCREV CUR MEDS BY ELIG CLIN: HCPCS | Performed by: INTERNAL MEDICINE

## 2025-04-07 PROCEDURE — 1036F TOBACCO NON-USER: CPT | Performed by: INTERNAL MEDICINE

## 2025-04-07 PROCEDURE — 3079F DIAST BP 80-89 MM HG: CPT | Performed by: INTERNAL MEDICINE

## 2025-04-07 PROCEDURE — 3075F SYST BP GE 130 - 139MM HG: CPT | Performed by: INTERNAL MEDICINE

## 2025-04-07 PROCEDURE — G8417 CALC BMI ABV UP PARAM F/U: HCPCS | Performed by: INTERNAL MEDICINE

## 2025-04-07 PROCEDURE — 99213 OFFICE O/P EST LOW 20 MIN: CPT | Performed by: INTERNAL MEDICINE

## 2025-04-07 PROCEDURE — 99212 OFFICE O/P EST SF 10 MIN: CPT | Performed by: INTERNAL MEDICINE

## 2025-04-07 RX ORDER — HYDROCHLOROTHIAZIDE 25 MG/1
25 TABLET ORAL DAILY
COMMUNITY
Start: 2025-02-04

## 2025-04-07 RX ORDER — AMLODIPINE BESYLATE 10 MG/1
10 TABLET ORAL DAILY
COMMUNITY
Start: 2025-02-04

## 2025-04-07 RX ORDER — TIRZEPATIDE 10 MG/.5ML
INJECTION, SOLUTION SUBCUTANEOUS
COMMUNITY
Start: 2025-04-03

## 2025-04-07 NOTE — PROGRESS NOTES
intact. No weakness. Sensation normal   Psych: Normal Mood  Musculoskeletal: No joint abnormalities.             Impression:    - Severe obstructive sleep apnea.  Compliance is a bit better but continues to be suboptimal.  - Hypersomnia.  Overall better.  This is due to above.  - Morbid obesity.  Needs weight loss surgery.    Recommendations:     - Continue using CPAP and try to improve compliance is much as possible.  -  Weight loss and exercise has been advised.   - No driving if sleepy or drowsy or impaired         Return in about 6 months (around 10/7/2025).       Electronically signed by Marie Sivlerio MD on 4/7/2025 at 3:46 PM

## 2025-04-30 ENCOUNTER — OFFICE VISIT (OUTPATIENT)
Age: 30
End: 2025-04-30
Payer: MEDICARE

## 2025-04-30 VITALS
WEIGHT: 315 LBS | SYSTOLIC BLOOD PRESSURE: 136 MMHG | BODY MASS INDEX: 62.18 KG/M2 | DIASTOLIC BLOOD PRESSURE: 84 MMHG | HEART RATE: 94 BPM

## 2025-04-30 DIAGNOSIS — G47.33 OSA (OBSTRUCTIVE SLEEP APNEA): ICD-10-CM

## 2025-04-30 DIAGNOSIS — M62.82 NON-TRAUMATIC RHABDOMYOLYSIS: ICD-10-CM

## 2025-04-30 DIAGNOSIS — F84.0 AUTISTIC DISORDER: ICD-10-CM

## 2025-04-30 DIAGNOSIS — G24.9 DYSKINESIA: ICD-10-CM

## 2025-04-30 DIAGNOSIS — G24.01 DRUG-INDUCED SUBACUTE DYSKINESIA: Primary | ICD-10-CM

## 2025-04-30 PROCEDURE — 3075F SYST BP GE 130 - 139MM HG: CPT | Performed by: PSYCHIATRY & NEUROLOGY

## 2025-04-30 PROCEDURE — 3079F DIAST BP 80-89 MM HG: CPT | Performed by: PSYCHIATRY & NEUROLOGY

## 2025-04-30 PROCEDURE — 1036F TOBACCO NON-USER: CPT | Performed by: PSYCHIATRY & NEUROLOGY

## 2025-04-30 PROCEDURE — 99213 OFFICE O/P EST LOW 20 MIN: CPT | Performed by: PSYCHIATRY & NEUROLOGY

## 2025-04-30 PROCEDURE — 99214 OFFICE O/P EST MOD 30 MIN: CPT | Performed by: PSYCHIATRY & NEUROLOGY

## 2025-04-30 PROCEDURE — G8427 DOCREV CUR MEDS BY ELIG CLIN: HCPCS | Performed by: PSYCHIATRY & NEUROLOGY

## 2025-04-30 PROCEDURE — G8417 CALC BMI ABV UP PARAM F/U: HCPCS | Performed by: PSYCHIATRY & NEUROLOGY

## 2025-04-30 RX ORDER — VALBENAZINE 80 MG/1
1 CAPSULE ORAL DAILY
Qty: 30 CAPSULE | Refills: 3 | Status: ACTIVE | OUTPATIENT
Start: 2025-04-30

## 2025-04-30 NOTE — PROGRESS NOTES
180.1 kg (397 lb)   BMI 62.18 kg/m²     Physical Exam  Vitals reviewed.   Eyes:      Pupils: Pupils are equal, round, and reactive to light.   Cardiovascular:      Rate and Rhythm: Normal rate and regular rhythm.      Heart sounds: No murmur heard.  Pulmonary:      Effort: Pulmonary effort is normal.   Musculoskeletal:         General: Normal range of motion.      Cervical back: Normal range of motion.   Skin:     General: Skin is warm.   Neurological:      Mental Status: He is alert and oriented to person, place, and time.      Cranial Nerves: No cranial nerve deficit.      Sensory: No sensory deficit.      Motor: No abnormal muscle tone.      Coordination: Coordination normal.      Deep Tendon Reflexes: Reflexes are normal and symmetric. Babinski sign absent on the right side. Babinski sign absent on the left side.   Psychiatric:         Mood and Affect: Mood normal.     Morbid obesity but no orofacial dyskinesias noted.  Is areflexic throughout    EGD  Result Date: 2025  [facility] Patient: HARRY LORENZO MRN: [id number] : 1995 Account: [id number] Sex at Birth: Male Age: 29 Years Procedure: Upper GI endoscopy Date: 2025 Attending Physician: RAZA JORGENSEN Indications:        -  Screening for Arroyo's esophagus in patient at risk for this           condition        -  Helicobacter pylori status not known        -  Exclusion of Helicobacter pylori Medications:        -  See the Anesthesia note for documentation of the administered           medications Complications:        -  No immediate complications.  Estimated blood loss: Minimal. Estimated Blood Loss:        -  Estimated blood loss was minimal. Procedure:        - The Gastroscope was introduced through the mouth and advanced to           the second part of the duodenum.        -  The upper GI endoscopy was accomplished without difficulty.        -  The patient tolerated the procedure well. Findings:        -  The Z-line was

## 2025-05-01 ENCOUNTER — OFFICE VISIT (OUTPATIENT)
Age: 30
End: 2025-05-01
Payer: MEDICARE

## 2025-05-01 VITALS
WEIGHT: 315 LBS | OXYGEN SATURATION: 98 % | SYSTOLIC BLOOD PRESSURE: 124 MMHG | BODY MASS INDEX: 49.44 KG/M2 | HEIGHT: 67 IN | HEART RATE: 84 BPM | DIASTOLIC BLOOD PRESSURE: 88 MMHG

## 2025-05-01 DIAGNOSIS — E78.2 MIXED HYPERLIPIDEMIA: ICD-10-CM

## 2025-05-01 DIAGNOSIS — E66.01 MORBID OBESITY (HCC): Primary | ICD-10-CM

## 2025-05-01 PROCEDURE — 99214 OFFICE O/P EST MOD 30 MIN: CPT | Performed by: SURGERY

## 2025-05-01 RX ORDER — ATORVASTATIN CALCIUM 20 MG/1
20 TABLET, FILM COATED ORAL DAILY
Qty: 90 TABLET | Refills: 0 | Status: SHIPPED | OUTPATIENT
Start: 2025-05-01

## 2025-05-01 NOTE — PROGRESS NOTES
Medical Weight Loss Follow Up    Patient Name:  :  Hospital Day:   Tomer Rivera 1995 [unfilled]     Date of Service: 2025    Subjective:      History of Present Illness:    Tomer Rivera  is a 29 y.o. male following up today for medical weight loss. Tomer Rivera has been on Mounjaro for 4 months, and has lost 5 lbs.  He denies any vision changes, abdominal pain, or neck masses.    Past Medical History:   Diagnosis Date    ADHD (attention deficit hyperactivity disorder) 2012    Allergic rhinitis 2012    Autism     Hypertension     Mixed hyperlipidemia 2023    Obesity (BMI 30-39.9) 2012    Sleep apnea     Past Surgical History:   Procedure Laterality Date    TONSILLECTOMY      UPPER GASTROINTESTINAL ENDOSCOPY N/A 2025    EGD ESOPHAGOGASTRODUODENOSCOPY WITH BIOPSIES performed by Joshua Dumont MD at [facility]        Family History   Problem Relation Age of Onset    No Known Problems Mother     No Known Problems Father     Social History     Tobacco Use    Smoking status: Former     Current packs/day: 0.00     Average packs/day: 0.5 packs/day for 7.0 years (3.5 ttl pk-yrs)     Types: Cigarettes, Cigars     Start date: 2013     Quit date: 2018     Years since quittin.9    Smokeless tobacco: Never   Vaping Use    Vaping status: Never Used   Substance Use Topics    Alcohol use: Yes     Comment: occ    Drug use: Yes     Types: Marijuana (Weed)        Allergies: Seasonal    Review of Systems  Review of Systems - History obtained from the patient  General ROS: negative for - fever, malaise, or weight loss  ENT ROS: negative for - headaches, nasal congestion, or sore throat  Hematological and Lymphatic ROS: No bruising or easy bleeding.  Respiratory ROS: no cough, shortness of breath, or wheezing  Cardiovascular ROS: no chest pain or dyspnea on exertion  Gastrointestinal ROS: Negative for abdominal pain, distention, hematochezia, melena, nausea,

## 2025-05-01 NOTE — TELEPHONE ENCOUNTER
Rec'd rx refill request from [facility] for Atorvastatin.    Pt has a pending appt 8/12/25 with Dr. Spence.   E-scribed as requested.

## 2025-05-29 ENCOUNTER — HOSPITAL ENCOUNTER (EMERGENCY)
Age: 30
Discharge: HOME | End: 2025-05-30
Payer: MEDICARE

## 2025-05-29 ENCOUNTER — APPOINTMENT (OUTPATIENT)
Dept: GENERAL RADIOLOGY | Age: 30
End: 2025-05-29
Payer: MEDICARE

## 2025-05-29 VITALS
DIASTOLIC BLOOD PRESSURE: 69 MMHG | WEIGHT: 315 LBS | TEMPERATURE: 98.2 F | BODY MASS INDEX: 49.44 KG/M2 | RESPIRATION RATE: 22 BRPM | OXYGEN SATURATION: 97 % | SYSTOLIC BLOOD PRESSURE: 131 MMHG | HEART RATE: 89 BPM | HEIGHT: 67 IN

## 2025-05-29 DIAGNOSIS — R11.0 NAUSEA: ICD-10-CM

## 2025-05-29 DIAGNOSIS — R10.84 GENERALIZED ABDOMINAL PAIN: Primary | ICD-10-CM

## 2025-05-29 LAB
ALBUMIN SERPL-MCNC: 4.5 G/DL (ref 3.5–4.6)
ALP SERPL-CCNC: 84 U/L (ref 35–104)
ALT SERPL-CCNC: 25 U/L (ref 0–41)
ANION GAP SERPL CALCULATED.3IONS-SCNC: 15 MEQ/L (ref 9–15)
AST SERPL-CCNC: 21 U/L (ref 0–40)
B-OH-BUTYR SERPL-SCNC: 1.3 MG/DL (ref 0.2–2.8)
BASE EXCESS VENOUS: 4 (ref -3–3)
BASOPHILS # BLD: 0 K/UL (ref 0–0.2)
BASOPHILS NFR BLD: 0.4 %
BILIRUB SERPL-MCNC: 0.9 MG/DL (ref 0.2–0.7)
BILIRUB UR QL STRIP: NEGATIVE
BUN SERPL-MCNC: 10 MG/DL (ref 6–20)
CALCIUM IONIZED: 1.18 MMOL/L (ref 1.12–1.32)
CALCIUM SERPL-MCNC: 9.2 MG/DL (ref 8.5–9.9)
CHLORIDE SERPL-SCNC: 100 MEQ/L (ref 95–107)
CLARITY UR: CLEAR
CO2 SERPL-SCNC: 24 MEQ/L (ref 20–31)
COLOR UR: YELLOW
CREAT SERPL-MCNC: 0.77 MG/DL (ref 0.7–1.2)
EOSINOPHIL # BLD: 0.3 K/UL (ref 0–0.7)
EOSINOPHIL NFR BLD: 6 %
ERYTHROCYTE [DISTWIDTH] IN BLOOD BY AUTOMATED COUNT: 12.8 % (ref 11.5–14.5)
GLOBULIN SER CALC-MCNC: 3.6 G/DL (ref 2.3–3.5)
GLUCOSE BLD-MCNC: 133 MG/DL (ref 70–99)
GLUCOSE SERPL-MCNC: 124 MG/DL (ref 70–99)
GLUCOSE UR STRIP-MCNC: NEGATIVE MG/DL
HCO3 VENOUS: 29.5 MMOL/L (ref 23–29)
HCT VFR BLD AUTO: 44.3 % (ref 42–52)
HCT VFR BLD AUTO: 45 % (ref 41–53)
HGB BLD CALC-MCNC: 15.1 GM/DL (ref 13.5–17.5)
HGB BLD-MCNC: 14.8 G/DL (ref 14–18)
HGB UR QL STRIP: NEGATIVE
KETONES UR STRIP-MCNC: NEGATIVE MG/DL
LACTATE: 2.56 MMOL/L (ref 0.4–2)
LEUKOCYTE ESTERASE UR QL STRIP: NEGATIVE
LIPASE SERPL-CCNC: 48 U/L (ref 12–95)
LYMPHOCYTES # BLD: 1.5 K/UL (ref 1–4.8)
LYMPHOCYTES NFR BLD: 27 %
MCH RBC QN AUTO: 27.6 PG (ref 27–31.3)
MCHC RBC AUTO-ENTMCNC: 33.4 % (ref 33–37)
MCV RBC AUTO: 82.6 FL (ref 79–92.2)
MONOCYTES # BLD: 0.4 K/UL (ref 0.2–0.8)
MONOCYTES NFR BLD: 8.2 %
NEUTROPHILS # BLD: 3.1 K/UL (ref 1.4–6.5)
NEUTS SEG NFR BLD: 58.2 %
NITRITE UR QL STRIP: NEGATIVE
O2 SAT, VEN: 71 %
PCO2 VENOUS: 49.4 MM HG (ref 40–50)
PERFORMED ON: ABNORMAL
PH UR STRIP: 6 [PH] (ref 5–9)
PH VENOUS: 7.38 (ref 7.32–7.42)
PLATELET # BLD AUTO: 301 K/UL (ref 130–400)
PO2 VENOUS: 39 MM HG
POC CHLORIDE: 103 MEQ/L (ref 99–110)
POC CREATININE: 0.8 MG/DL (ref 0.8–1.3)
POC FIO2: 21
POC SAMPLE TYPE: ABNORMAL
POTASSIUM SERPL-SCNC: 3.7 MEQ/L (ref 3.5–5.1)
POTASSIUM SERPL-SCNC: 3.9 MEQ/L (ref 3.4–4.9)
PROT SERPL-MCNC: 8.1 G/DL (ref 6.3–8)
PROT UR STRIP-MCNC: NEGATIVE MG/DL
RBC # BLD AUTO: 5.36 M/UL (ref 4.7–6.1)
SODIUM BLD-SCNC: 142 MEQ/L (ref 136–145)
SODIUM SERPL-SCNC: 139 MEQ/L (ref 135–144)
SP GR UR STRIP: 1.02 (ref 1–1.03)
TCO2 CALC VENOUS: 31 MMOL/L
URINE REFLEX TO CULTURE: NORMAL
UROBILINOGEN UR STRIP-ACNC: 1 E.U./DL
WBC # BLD AUTO: 5.4 K/UL (ref 4.8–10.8)

## 2025-05-29 PROCEDURE — 84132 ASSAY OF SERUM POTASSIUM: CPT

## 2025-05-29 PROCEDURE — 99285 EMERGENCY DEPT VISIT HI MDM: CPT

## 2025-05-29 PROCEDURE — 82330 ASSAY OF CALCIUM: CPT

## 2025-05-29 PROCEDURE — 82803 BLOOD GASES ANY COMBINATION: CPT

## 2025-05-29 PROCEDURE — 82435 ASSAY OF BLOOD CHLORIDE: CPT

## 2025-05-29 PROCEDURE — 80053 COMPREHEN METABOLIC PANEL: CPT

## 2025-05-29 PROCEDURE — 36415 COLL VENOUS BLD VENIPUNCTURE: CPT

## 2025-05-29 PROCEDURE — 82565 ASSAY OF CREATININE: CPT

## 2025-05-29 PROCEDURE — 84295 ASSAY OF SERUM SODIUM: CPT

## 2025-05-29 PROCEDURE — 85025 COMPLETE CBC W/AUTO DIFF WBC: CPT

## 2025-05-29 PROCEDURE — 81003 URINALYSIS AUTO W/O SCOPE: CPT

## 2025-05-29 PROCEDURE — 71045 X-RAY EXAM CHEST 1 VIEW: CPT

## 2025-05-29 PROCEDURE — 93005 ELECTROCARDIOGRAM TRACING: CPT | Performed by: PHYSICIAN ASSISTANT

## 2025-05-29 PROCEDURE — 82010 KETONE BODYS QUAN: CPT

## 2025-05-29 PROCEDURE — 83690 ASSAY OF LIPASE: CPT

## 2025-05-29 PROCEDURE — 85014 HEMATOCRIT: CPT

## 2025-05-29 PROCEDURE — 83605 ASSAY OF LACTIC ACID: CPT

## 2025-05-29 PROCEDURE — 36600 WITHDRAWAL OF ARTERIAL BLOOD: CPT

## 2025-05-29 ASSESSMENT — ENCOUNTER SYMPTOMS
DIARRHEA: 0
VOMITING: 1
RHINORRHEA: 0
NAUSEA: 1
CONSTIPATION: 0
COUGH: 0
SHORTNESS OF BREATH: 0
ABDOMINAL PAIN: 1

## 2025-05-29 ASSESSMENT — PAIN SCALES - GENERAL: PAINLEVEL_OUTOF10: 6

## 2025-05-29 ASSESSMENT — PAIN DESCRIPTION - LOCATION: LOCATION: ABDOMEN

## 2025-05-29 ASSESSMENT — PAIN - FUNCTIONAL ASSESSMENT: PAIN_FUNCTIONAL_ASSESSMENT: 0-10

## 2025-05-29 ASSESSMENT — PAIN DESCRIPTION - DESCRIPTORS: DESCRIPTORS: ACHING

## 2025-05-29 NOTE — ED PROVIDER NOTES
MEDICAL SCREENING EXAM     Basic Information   Time Seen: 7:52 PM   Primary Care Provider: Violet Moses APRN - NP     Chief Complaint   Patient presents with    Nausea      HPI   Tomer Rivera is a 29 yrs male who presents with nausea vomiting epigastric pain.  Ongoing for several days.  States he is diabetic and is unsure what his blood sugars been running.   Physical Exam     BP (!) 161/125 (05/29/25 1946)    Temp 98.2 °F (36.8 °C) (05/29/25 1946)    Pulse 92 (05/29/25 1946)   Resp 18 (05/29/25 1946)    SpO2 97 % (05/29/25 1946)       General: Awake and Alert, no acute distress   CV: RRR, S1, S2   Resp: LCTAB, even and non labored   Other:   Impression and Plan     Labs Reviewed   CBC WITH AUTO DIFFERENTIAL   COMPREHENSIVE METABOLIC PANEL   BETA-HYDROXYBUTYRATE   URINALYSIS WITH REFLEX TO CULTURE   LIPASE   POCT EPOC BLOOD GAS, LACTIC ACID, ICA   POCT GLUCOSE        No orders to display      Final Impression   I have performed a medical screening exam on Tomer Rivera. Based on this patient's chief complaint/symptoms of   Chief Complaint   Patient presents with    Nausea    and my focused exam, their care will be started and transitioned to provider when room is available       Case Villalba PA-C  05/29/25 1952

## 2025-05-29 NOTE — ED TRIAGE NOTES
Patient arrived to ER by private vehicle, dropped off by family.   Patient c/o abdominal pain, N/V.   Symptoms started 2 days ago.

## 2025-05-30 LAB
EKG ATRIAL RATE: 93 BPM
EKG DIAGNOSIS: NORMAL
EKG P AXIS: 46 DEGREES
EKG P-R INTERVAL: 158 MS
EKG Q-T INTERVAL: 358 MS
EKG QRS DURATION: 88 MS
EKG QTC CALCULATION (BAZETT): 445 MS
EKG R AXIS: 69 DEGREES
EKG T AXIS: 41 DEGREES
EKG VENTRICULAR RATE: 93 BPM

## 2025-05-30 NOTE — ED PROVIDER NOTES
[facility] EMERGENCY DEPARTMENT  EMERGENCY DEPARTMENT ENCOUNTER      Pt Name: Tomer Rivera  MRN: [id number]  Birthdate 1995  Date of evaluation: 5/29/2025  Provider: Rogelio Infante PA-C  10:13 PM EDT    CHIEF COMPLAINT       Chief Complaint   Patient presents with    Nausea         HISTORY OF PRESENT ILLNESS   (Location/Symptom, Timing/Onset, Context/Setting, Quality, Duration, Modifying Factors, Severity)  Note limiting factors.   Tomer Rivera is a 29 y.o. male with past medical history of ADHD, heart disease, bipolar disorder, rhabdomyolysis, panic disorder, CHRISTOPHER, DMII, hyperlipidemia schizoaffective who presents to the emergency department with generalized abdominal pain, nausea, and vomiting for the past 2 days.  Patient states that the symptoms wax and wane.  He states that he is feeling much better since being here.  He denies fever, chills, headache, dizziness.  He does endorse some shortness of breath today.  Denies new cough, congestion, runny nose.  No thinners at home.  Denies hematemesis or hematochezia.    HPI    Nursing Notes were reviewed.    REVIEW OF SYSTEMS    (2-9 systems for level 4, 10 or more for level 5)     Review of Systems   Constitutional:  Negative for chills and fever.   HENT:  Negative for congestion and rhinorrhea.    Respiratory:  Negative for cough and shortness of breath.    Cardiovascular:  Negative for chest pain.   Gastrointestinal:  Positive for abdominal pain, nausea and vomiting. Negative for constipation and diarrhea.   Genitourinary:  Negative for dysuria.   Neurological:  Negative for headaches.       Except as noted above the remainder of the review of systems was reviewed and negative.       PAST MEDICAL HISTORY     Past Medical History:   Diagnosis Date    ADHD (attention deficit hyperactivity disorder) 04/04/2012    Allergic rhinitis 04/04/2012    Autism     Hypertension     Mixed hyperlipidemia 12/7/2023    Obesity (BMI 30-39.9) 04/04/2012    Sleep apnea

## 2025-06-05 NOTE — ED PROVIDER NOTES
[facility] ED  EMERGENCY DEPARTMENT ENCOUNTER      Pt Name: Esme Doan  MRN: [id number]  Auragfellis 1995  Date of evaluation: 1/25/2022  Provider: Gricel Rivers Dr       Chief Complaint   Patient presents with    Other         HISTORY OF PRESENT ILLNESS   (Location/Symptom, Timing/Onset, Context/Setting, Quality, Duration, Modifying Factors, Severity)  Note limiting factors. Esme Doan is a 32 y.o. male who per chart review has pmhx of bipolar disorder, CHRISTOPHER, obesity, ADHD, HTN, mental retardation presents to the emergency department for evaluation of diffuse myalgias and muscle cramping. Pt states that he was on Abilify for bipolar disorder and began to have uncontrolled movements. Psychiatrist weaned pt off of ability and started him on oxcarbazepine and austedo in December but he is not improving. She also lowered his depakote dose. His doctor recommend that he come to the ED for management of symptoms. He denies exposure to covid. Denies fever chills cp sob headache dizziness cough abd pain nvd back or flank pain urinary sx visual changes numbness tingling weakness. Denies SI, HI, states bipolar disorder has been controlled with current medications. HPI    Nursing Notes were reviewed. REVIEW OF SYSTEMS    (2-9 systems for level 4, 10 or more for level 5)     Review of Systems   Constitutional: Negative for chills and fever. HENT: Negative for congestion. Eyes: Negative for photophobia. Respiratory: Negative for cough, shortness of breath and wheezing. Cardiovascular: Negative for chest pain and palpitations. Gastrointestinal: Negative for abdominal pain, nausea and vomiting. Genitourinary: Negative for dysuria, frequency and hematuria. Musculoskeletal: Positive for myalgias. Allergic/Immunologic: Negative for immunocompromised state. Neurological: Negative for dizziness, weakness and headaches.    Psychiatric/Behavioral: The patient is nervous/anxious and is hyperactive. All other systems reviewed and are negative. Except as noted above the remainder of the review of systems was reviewed and negative. PAST MEDICAL HISTORY     Past Medical History:   Diagnosis Date    ADHD (attention deficit hyperactivity disorder) 4/4/2012    Allergic rhinitis 4/4/2012    Hypertension     Obesity (BMI 30-39.9) 4/4/2012    Sleep apnea          SURGICAL HISTORY       Past Surgical History:   Procedure Laterality Date    TONSILLECTOMY           CURRENT MEDICATIONS       Discharge Medication List as of 1/25/2022 10:44 PM      CONTINUE these medications which have NOT CHANGED    Details   amLODIPine (NORVASC) 10 MG tablet TAKE 1 TABLET BY MOUTH EVERY DAY, Disp-90 tablet, R-1Normal      hydroCHLOROthiazide (HYDRODIURIL) 25 MG tablet TAKE 1 TABLET BY MOUTH EVERY MORNING, Disp-90 tablet, R-1Normal      losartan (COZAAR) 100 MG tablet TAKE 1 TABLET BY MOUTH EVERY DAY, Disp-90 tablet, R-1Normal      ARIPiprazole (ABILIFY) 10 MG tablet TAKE 1 TABLET BY MOUTH EVERY DAY, R-2Historical Med      benztropine (COGENTIN) 1 MG tablet Take 1 mg by mouth 2 times daily, R-0Historical Med      divalproex (DEPAKOTE ER) 500 MG extended release tablet Take 1 tablet by mouth nightly, Disp-30 tablet, R-3Normal      Blood Pressure Monitoring (ADULT BLOOD PRESSURE CUFF LG) KIT Disp-1 kit, R-0, PrintCheck blood pressure every other day             ALLERGIES     Patient has no known allergies.     FAMILY HISTORY       Family History   Problem Relation Age of Onset    No Known Problems Mother     No Known Problems Father           SOCIAL HISTORY       Social History     Socioeconomic History    Marital status: Single     Spouse name: None    Number of children: None    Years of education: None    Highest education level: None   Occupational History    None   Tobacco Use    Smoking status: Former Smoker     Packs/day: 0.50     Years: 7.00     Pack years: 3.50     Types: Cigarettes     Start date: 11/25/2013     Quit date: 6/1/2018     Years since quitting: 3.6    Smokeless tobacco: Never Used   Substance and Sexual Activity    Alcohol use: Yes     Comment: occ    Drug use: No    Sexual activity: None   Other Topics Concern    None   Social History Narrative    None     Social Determinants of Health     Financial Resource Strain:     Difficulty of Paying Living Expenses: Not on file   Food Insecurity:     Worried About Running Out of Food in the Last Year: Not on file    Jakob of Food in the Last Year: Not on file   Transportation Needs:     Lack of Transportation (Medical): Not on file    Lack of Transportation (Non-Medical):  Not on file   Physical Activity:     Days of Exercise per Week: Not on file    Minutes of Exercise per Session: Not on file   Stress:     Feeling of Stress : Not on file   Social Connections:     Frequency of Communication with Friends and Family: Not on file    Frequency of Social Gatherings with Friends and Family: Not on file    Attends [employer] Services: Not on file    Active Member of [address] [place] [address] or Organizations: Not on file    Attends Club or Organization Meetings: Not on file    Marital Status: Not on file   Intimate Partner Violence:     Fear of Current or Ex-Partner: Not on file    Emotionally Abused: Not on file    Physically Abused: Not on file    Sexually Abused: Not on file   Housing Stability:     Unable to Pay for Housing in the Last Year: Not on file    Number of [place] in the Last Year: Not on file    Unstable Housing in the Last Year: Not on file       SCREENINGS                               CIWA Assessment  BP: (!) 176/87  Pulse: 89                 PHYSICAL EXAM    (up to 7 for level 4, 8 or more for level 5)     ED Triage Vitals [01/25/22 1649]   BP Temp Temp Source Pulse Resp SpO2 Height Weight   -- 98.3 °F (36.8 °C) Temporal 110 22 92 % 5' 7\" (1.702 m) 282 lb (127.9 kg)       Physical Exam  Constitutional: General: He is not in acute distress. Appearance: He is well-developed. He is diaphoretic. He is not ill-appearing or toxic-appearing. HENT:      Head: Normocephalic and atraumatic. Nose: Nose normal.      Mouth/Throat:      Mouth: Mucous membranes are moist.   Eyes:      Pupils: Pupils are equal, round, and reactive to light. Cardiovascular:      Rate and Rhythm: Normal rate and regular rhythm. Heart sounds: No murmur heard. No friction rub. No gallop. Pulmonary:      Effort: Pulmonary effort is normal.      Breath sounds: Normal breath sounds. Abdominal:      General: There is no distension. Tenderness: There is no abdominal tenderness. Musculoskeletal:         General: No swelling. Cervical back: Normal range of motion. Skin:     General: Skin is warm. Neurological:      Mental Status: He is alert and oriented to person, place, and time. Psychiatric:         Mood and Affect: Mood is anxious. Behavior: Behavior is hyperactive. Thought Content: Thought content does not include homicidal or suicidal ideation. Thought content does not include homicidal or suicidal plan. Comments: Pt very restless, unable to sit still. Uncontrollable movements of arms. Stating hands and feet are cramping. DIAGNOSTIC RESULTS     EKG: All EKG's are interpreted by the Emergency Department Physician who either signs or Co-signs this chart in the absence of a cardiologist.    EKG shows NSR with HR 97, normal axis, normal intervals, no ST changes.         RADIOLOGY:   Non-plain film images such as CT, Ultrasound and MRI are read by the radiologist. Plain radiographic images are visualized and preliminarily interpreted by the emergency physician with the below findings:      Interpretation per the Radiologist below, if available at the time of this note:    No orders to display         ED BEDSIDE ULTRASOUND:   Performed by ED Physician - none    LABS:  Labs Reviewed COMPREHENSIVE METABOLIC PANEL - Abnormal; Notable for the following components:       Result Value    Glucose 103 (*)     Calcium 10.1 (*)     Total Protein 8.3 (*)     Albumin 5.0 (*)     Total Bilirubin 1.2 (*)     ALT 45 (*)     AST 44 (*)     All other components within normal limits   LACTIC ACID, PLASMA - Abnormal; Notable for the following components:    Lactic Acid 3.3 (*)     All other components within normal limits    Narrative:     Andrew De Luna tel. [phone],  Chemistry results called to and read back by [facility], 01/25/2022 20:04,  by [facility]   URINE RT REFLEX TO CULTURE - Abnormal; Notable for the following components:    Ketones, Urine TRACE (*)     Protein, UA 30 (*)     All other components within normal limits   CK - Abnormal; Notable for the following components: Total CK 1,370 (*)     All other components within normal limits   ACETAMINOPHEN LEVEL - Abnormal; Notable for the following components:    Acetaminophen Level <5 (*)     All other components within normal limits   SALICYLATE LEVEL - Abnormal; Notable for the following components:    Salicylate, Serum <7.8 (*)     All other components within normal limits   VALPROIC ACID LEVEL, TOTAL - Abnormal; Notable for the following components:    Valproic Acid Lvl 3.9 (*)     All other components within normal limits   CKMB & RELATIVE PERCENT - Abnormal; Notable for the following components:    CK-MB 20.1 (*)     All other components within normal limits   COVID-19, RAPID   URINE DRUG SCREEN   ETHANOL   CBC WITH AUTO DIFFERENTIAL   MAGNESIUM   TSH WITHOUT REFLEX   MICROSCOPIC URINALYSIS   LACTIC ACID, PLASMA   ETHANOL       All other labs were within normal range or not returned as of this dictation.     EMERGENCY DEPARTMENT COURSE and DIFFERENTIAL DIAGNOSIS/MDM:   Vitals:    Vitals:    01/25/22 1845 01/25/22 1900 01/25/22 2015 01/25/22 2145   BP: (!) 165/48 (!) 140/106  (!) 176/87   Pulse: 95 97 95 89   Resp:    16   Temp: TempSrc:       SpO2: 97% 97% 100% 98%   Weight:       Height:           MDM     Pt is a 33 yo M who presents to the ED for evaluation of anxiety, myalias, muscle cramping. This is a chronic issue for patient since being placed on Abilify. He has weaned off Abilify and taking Austedo however not helping TD symptoms. He is afebrile, tachycardic to 110 on arrival. He was given 1 L IV NS and IV ativan in the ED. EKG shows NSR with HR 97, normal axis, normal intervals, no ST changes. Lactic acid 3.3. CK 1300. Patient was given an additional liter of fluid. Remainder of labs unremarkable. Repeat lactic acid 0.8. Patient reassessed states she is feeling completely improved. Clinically patient appears improved. He is non toxic appearing with stable vitals, stable for discharge. F/u with psychiatry in 1 day. Return to the ED for worsening symptoms, given warning signs for which he should return. Pt understands and agrees to plan. REASSESSMENT          CRITICAL CARE TIME   Total Critical Care time was 0 minutes, excluding separately reportable procedures. There was a high probability of clinically significant/life threatening deterioration in the patient's condition which required my urgent intervention. CONSULTS:  None    PROCEDURES:  Unless otherwise noted below, none     Procedures        FINAL IMPRESSION      1. Adverse effect of drug, initial encounter    2.  Non-traumatic rhabdomyolysis          DISPOSITION/PLAN   DISPOSITION Decision To Discharge 01/25/2022 10:43:16 PM      PATIENT REFERRED TO:  Govind Peralta MD  [address] [place] [address]  [phone]    Schedule an appointment as soon as possible for a visit in 1 day      [facility] ED  [address] [place] [address]  [phone]  Go to   As needed, If symptoms worsen      DISCHARGE MEDICATIONS:  Discharge Medication List as of 1/25/2022 10:44 PM      START taking these medications    Details   LORazepam (ATIVAN) 0.5 MG tablet Take 1 tablet by mouth 3 times daily as needed for Anxiety for up to 2 days. , Disp-6 tablet, R-0Print           Controlled Substances Monitoring:     No flowsheet data found.     (Please note that portions of this note were completed with a voice recognition program.  Efforts were made to edit the dictations but occasionally words are mis-transcribed.)    Magdiel Perez PA-C (electronically signed)             Magdiel Perez PA-C  01/26/22 3087 Within functional limits

## 2025-07-31 ENCOUNTER — OFFICE VISIT (OUTPATIENT)
Age: 30
End: 2025-07-31
Payer: MEDICARE

## 2025-07-31 VITALS
HEART RATE: 95 BPM | HEIGHT: 67 IN | BODY MASS INDEX: 49.44 KG/M2 | WEIGHT: 315 LBS | SYSTOLIC BLOOD PRESSURE: 130 MMHG | OXYGEN SATURATION: 99 % | DIASTOLIC BLOOD PRESSURE: 82 MMHG

## 2025-07-31 DIAGNOSIS — K59.03 DRUG INDUCED CONSTIPATION: ICD-10-CM

## 2025-07-31 DIAGNOSIS — E66.01 MORBID OBESITY (HCC): Primary | ICD-10-CM

## 2025-07-31 PROCEDURE — 99215 OFFICE O/P EST HI 40 MIN: CPT | Performed by: SURGERY

## 2025-07-31 PROCEDURE — 1036F TOBACCO NON-USER: CPT | Performed by: SURGERY

## 2025-07-31 PROCEDURE — 3079F DIAST BP 80-89 MM HG: CPT | Performed by: SURGERY

## 2025-07-31 PROCEDURE — G8417 CALC BMI ABV UP PARAM F/U: HCPCS | Performed by: SURGERY

## 2025-07-31 PROCEDURE — 99214 OFFICE O/P EST MOD 30 MIN: CPT | Performed by: SURGERY

## 2025-07-31 PROCEDURE — 3075F SYST BP GE 130 - 139MM HG: CPT | Performed by: SURGERY

## 2025-07-31 PROCEDURE — G8427 DOCREV CUR MEDS BY ELIG CLIN: HCPCS | Performed by: SURGERY

## 2025-07-31 RX ORDER — DOCUSATE SODIUM 100 MG/1
100 CAPSULE, LIQUID FILLED ORAL 2 TIMES DAILY
Qty: 60 CAPSULE | Refills: 0 | Status: SHIPPED | OUTPATIENT
Start: 2025-07-31 | End: 2025-08-30

## 2025-07-31 RX ORDER — POLYETHYLENE GLYCOL 3350 17 G/17G
17 POWDER, FOR SOLUTION ORAL DAILY
Qty: 1530 G | Refills: 1 | Status: SHIPPED | OUTPATIENT
Start: 2025-07-31 | End: 2026-01-27

## 2025-07-31 RX ORDER — TIRZEPATIDE 15 MG/.5ML
INJECTION, SOLUTION SUBCUTANEOUS
COMMUNITY
Start: 2025-07-16 | End: 2025-07-31

## 2025-07-31 NOTE — PATIENT INSTRUCTIONS
We discussed the risks and benefits of synthetic GLP-I therapy.  The risks include:    increased risk for thyroid cancer (actual risk not established in human trials)  Inflammation of the pancreas (pancreatitis).  Low blood sugar (hypoglycemia).   Serious allergic reactions.  Kidney problems (kidney failure).  Gastric paralysis.  Changes in vision, including blindness.  Gallbladder problems.  Increased risk of blood clots (DVT)   Increased body fat rebound when the meds are stopped or stop working due to increased muscle loss vs other weight loss methods.    Continue Mounjaro weekly and follow up in 2 months.

## 2025-08-12 ENCOUNTER — APPOINTMENT (OUTPATIENT)
Dept: CARDIOLOGY | Facility: CLINIC | Age: 30
End: 2025-08-12
Payer: MEDICARE

## 2025-08-19 ENCOUNTER — APPOINTMENT (OUTPATIENT)
Dept: CARDIOLOGY | Facility: CLINIC | Age: 30
End: 2025-08-19
Payer: MEDICARE

## 2025-08-19 VITALS
DIASTOLIC BLOOD PRESSURE: 78 MMHG | WEIGHT: 315 LBS | HEART RATE: 80 BPM | BODY MASS INDEX: 49.44 KG/M2 | HEIGHT: 67 IN | SYSTOLIC BLOOD PRESSURE: 110 MMHG

## 2025-08-19 DIAGNOSIS — E11.9 DIABETES MELLITUS TYPE II, NON INSULIN DEPENDENT (MULTI): ICD-10-CM

## 2025-08-19 DIAGNOSIS — I10 PRIMARY HYPERTENSION: ICD-10-CM

## 2025-08-19 DIAGNOSIS — E78.2 MIXED HYPERLIPIDEMIA: ICD-10-CM

## 2025-08-19 DIAGNOSIS — E66.01 MORBID OBESITY (MULTI): ICD-10-CM

## 2025-08-19 PROCEDURE — 3008F BODY MASS INDEX DOCD: CPT | Performed by: INTERNAL MEDICINE

## 2025-08-19 PROCEDURE — G2211 COMPLEX E/M VISIT ADD ON: HCPCS | Performed by: INTERNAL MEDICINE

## 2025-08-19 PROCEDURE — 3078F DIAST BP <80 MM HG: CPT | Performed by: INTERNAL MEDICINE

## 2025-08-19 PROCEDURE — 4010F ACE/ARB THERAPY RXD/TAKEN: CPT | Performed by: INTERNAL MEDICINE

## 2025-08-19 PROCEDURE — 99214 OFFICE O/P EST MOD 30 MIN: CPT | Performed by: INTERNAL MEDICINE

## 2025-08-19 PROCEDURE — 3074F SYST BP LT 130 MM HG: CPT | Performed by: INTERNAL MEDICINE

## 2025-08-19 RX ORDER — ATORVASTATIN CALCIUM 20 MG/1
20 TABLET, FILM COATED ORAL DAILY
Qty: 90 TABLET | Refills: 3 | Status: SHIPPED | OUTPATIENT
Start: 2025-08-19 | End: 2026-08-19

## 2025-08-19 RX ORDER — HYDROCHLOROTHIAZIDE 25 MG/1
25 TABLET ORAL DAILY
Qty: 90 TABLET | Refills: 3 | Status: SHIPPED | OUTPATIENT
Start: 2025-08-19 | End: 2026-08-19

## 2025-08-19 RX ORDER — METOPROLOL SUCCINATE 50 MG/1
50 TABLET, EXTENDED RELEASE ORAL DAILY
Qty: 90 TABLET | Refills: 3 | Status: SHIPPED | OUTPATIENT
Start: 2025-08-19

## 2025-08-19 RX ORDER — AMLODIPINE BESYLATE 10 MG/1
10 TABLET ORAL DAILY
Qty: 90 TABLET | Refills: 3 | Status: SHIPPED | OUTPATIENT
Start: 2025-08-19 | End: 2026-08-19

## 2025-08-19 RX ORDER — LOSARTAN POTASSIUM 100 MG/1
100 TABLET ORAL DAILY
Qty: 90 TABLET | Refills: 3 | Status: SHIPPED | OUTPATIENT
Start: 2025-08-19 | End: 2026-08-19

## 2025-08-30 ENCOUNTER — APPOINTMENT (OUTPATIENT)
Dept: GENERAL RADIOLOGY | Age: 30
End: 2025-08-30
Payer: MEDICARE

## 2025-08-30 ENCOUNTER — HOSPITAL ENCOUNTER (EMERGENCY)
Age: 30
Discharge: HOME | End: 2025-08-30
Payer: MEDICARE

## 2025-08-30 VITALS
BODY MASS INDEX: 50.62 KG/M2 | HEIGHT: 66 IN | SYSTOLIC BLOOD PRESSURE: 157 MMHG | OXYGEN SATURATION: 98 % | DIASTOLIC BLOOD PRESSURE: 83 MMHG | WEIGHT: 315 LBS | HEART RATE: 108 BPM | TEMPERATURE: 99.8 F | RESPIRATION RATE: 20 BRPM

## 2025-08-30 DIAGNOSIS — J40 BRONCHITIS: Primary | ICD-10-CM

## 2025-08-30 DIAGNOSIS — R07.9 CHEST PAIN, UNSPECIFIED TYPE: ICD-10-CM

## 2025-08-30 LAB
ALBUMIN SERPL-MCNC: 5.1 G/DL (ref 3.5–4.6)
ALP SERPL-CCNC: 80 U/L (ref 35–104)
ALT SERPL-CCNC: 26 U/L (ref 0–41)
ANION GAP SERPL CALCULATED.3IONS-SCNC: 13 MEQ/L (ref 9–15)
AST SERPL-CCNC: 32 U/L (ref 0–40)
BASOPHILS # BLD: 0.1 K/UL (ref 0–0.2)
BASOPHILS NFR BLD: 0.6 %
BILIRUB SERPL-MCNC: 1.5 MG/DL (ref 0.2–0.7)
BUN SERPL-MCNC: 9 MG/DL (ref 6–20)
CALCIUM SERPL-MCNC: 9.6 MG/DL (ref 8.5–9.9)
CHLORIDE SERPL-SCNC: 97 MEQ/L (ref 95–107)
CO2 SERPL-SCNC: 26 MEQ/L (ref 20–31)
CREAT SERPL-MCNC: 0.8 MG/DL (ref 0.7–1.2)
D DIMER PPP FEU-MCNC: 0.3 MG/L FEU (ref 0–0.5)
EKG ATRIAL RATE: 110 BPM
EKG DIAGNOSIS: NORMAL
EKG P AXIS: 33 DEGREES
EKG P-R INTERVAL: 176 MS
EKG Q-T INTERVAL: 342 MS
EKG QRS DURATION: 86 MS
EKG QTC CALCULATION (BAZETT): 462 MS
EKG R AXIS: 69 DEGREES
EKG T AXIS: 47 DEGREES
EKG VENTRICULAR RATE: 110 BPM
EOSINOPHIL # BLD: 0.2 K/UL (ref 0–0.7)
EOSINOPHIL NFR BLD: 3 %
ERYTHROCYTE [DISTWIDTH] IN BLOOD BY AUTOMATED COUNT: 13.2 % (ref 11.5–14.5)
GLOBULIN SER CALC-MCNC: 3.3 G/DL (ref 2.3–3.5)
GLUCOSE SERPL-MCNC: 87 MG/DL (ref 70–99)
HCT VFR BLD AUTO: 44 % (ref 42–52)
HGB BLD-MCNC: 14.4 G/DL (ref 14–18)
LYMPHOCYTES # BLD: 1.2 K/UL (ref 1–4.8)
LYMPHOCYTES NFR BLD: 15.1 %
MCH RBC QN AUTO: 28.1 PG (ref 27–31.3)
MCHC RBC AUTO-ENTMCNC: 32.7 % (ref 33–37)
MCV RBC AUTO: 85.9 FL (ref 79–92.2)
MONOCYTES # BLD: 0.9 K/UL (ref 0.2–0.8)
MONOCYTES NFR BLD: 10.8 %
NEUTROPHILS # BLD: 5.6 K/UL (ref 1.4–6.5)
NEUTS SEG NFR BLD: 70.3 %
PLATELET # BLD AUTO: 232 K/UL (ref 130–400)
POTASSIUM SERPL-SCNC: 4.6 MEQ/L (ref 3.4–4.9)
PROCALCITONIN SERPL IA-MCNC: 0.05 NG/ML (ref 0–0.15)
PROT SERPL-MCNC: 8.4 G/DL (ref 6.3–8)
RBC # BLD AUTO: 5.12 M/UL (ref 4.7–6.1)
SODIUM SERPL-SCNC: 136 MEQ/L (ref 135–144)
TROPONIN, HIGH SENSITIVITY: 8 NG/L (ref 0–19)
TROPONIN, HIGH SENSITIVITY: 9 NG/L (ref 0–19)
WBC # BLD AUTO: 8 K/UL (ref 4.8–10.8)

## 2025-08-30 PROCEDURE — 6370000000 HC RX 637 (ALT 250 FOR IP): Performed by: PHYSICIAN ASSISTANT

## 2025-08-30 PROCEDURE — 93005 ELECTROCARDIOGRAM TRACING: CPT | Performed by: PHYSICIAN ASSISTANT

## 2025-08-30 PROCEDURE — 85025 COMPLETE CBC W/AUTO DIFF WBC: CPT

## 2025-08-30 PROCEDURE — 84145 PROCALCITONIN (PCT): CPT

## 2025-08-30 PROCEDURE — 85379 FIBRIN DEGRADATION QUANT: CPT

## 2025-08-30 PROCEDURE — 84484 ASSAY OF TROPONIN QUANT: CPT

## 2025-08-30 PROCEDURE — 99285 EMERGENCY DEPT VISIT HI MDM: CPT

## 2025-08-30 PROCEDURE — 96374 THER/PROPH/DIAG INJ IV PUSH: CPT

## 2025-08-30 PROCEDURE — 80053 COMPREHEN METABOLIC PANEL: CPT

## 2025-08-30 PROCEDURE — 71045 X-RAY EXAM CHEST 1 VIEW: CPT

## 2025-08-30 PROCEDURE — 6360000002 HC RX W HCPCS: Performed by: PHYSICIAN ASSISTANT

## 2025-08-30 RX ORDER — PREDNISONE 20 MG/1
40 TABLET ORAL DAILY
Qty: 10 TABLET | Refills: 0 | Status: SHIPPED | OUTPATIENT
Start: 2025-08-30 | End: 2025-09-04

## 2025-08-30 RX ORDER — KETOROLAC TROMETHAMINE 15 MG/ML
15 INJECTION, SOLUTION INTRAMUSCULAR; INTRAVENOUS ONCE
Status: COMPLETED | OUTPATIENT
Start: 2025-08-30 | End: 2025-08-30

## 2025-08-30 RX ORDER — AZITHROMYCIN 250 MG/1
TABLET, FILM COATED ORAL
Qty: 1 PACKET | Refills: 0 | Status: SHIPPED | OUTPATIENT
Start: 2025-08-30 | End: 2025-09-09

## 2025-08-30 RX ORDER — ACETAMINOPHEN 500 MG
1000 TABLET ORAL ONCE
Status: COMPLETED | OUTPATIENT
Start: 2025-08-30 | End: 2025-08-30

## 2025-08-30 RX ADMIN — ACETAMINOPHEN 1000 MG: 500 TABLET ORAL at 14:39

## 2025-08-30 RX ADMIN — KETOROLAC TROMETHAMINE 15 MG: 15 INJECTION, SOLUTION INTRAMUSCULAR; INTRAVENOUS at 14:39

## 2025-08-30 ASSESSMENT — ENCOUNTER SYMPTOMS
ABDOMINAL PAIN: 0
NAUSEA: 0
SORE THROAT: 0
CONSTIPATION: 0
ABDOMINAL DISTENTION: 0
COLOR CHANGE: 0
VOMITING: 0
DIARRHEA: 0
COUGH: 1
EYE DISCHARGE: 0
RHINORRHEA: 0
SHORTNESS OF BREATH: 1

## 2025-08-30 ASSESSMENT — PAIN DESCRIPTION - LOCATION: LOCATION: HEAD

## 2025-08-30 ASSESSMENT — PAIN DESCRIPTION - DESCRIPTORS: DESCRIPTORS: ACHING

## 2025-08-30 ASSESSMENT — PAIN - FUNCTIONAL ASSESSMENT
PAIN_FUNCTIONAL_ASSESSMENT: 0-10
PAIN_FUNCTIONAL_ASSESSMENT: 0-10

## 2025-08-30 ASSESSMENT — PAIN SCALES - GENERAL: PAINLEVEL_OUTOF10: 0

## 2025-09-02 LAB
EKG ATRIAL RATE: 110 BPM
EKG DIAGNOSIS: NORMAL
EKG P AXIS: 33 DEGREES
EKG P-R INTERVAL: 176 MS
EKG Q-T INTERVAL: 342 MS
EKG QRS DURATION: 86 MS
EKG QTC CALCULATION (BAZETT): 462 MS
EKG R AXIS: 69 DEGREES
EKG T AXIS: 47 DEGREES
EKG VENTRICULAR RATE: 110 BPM

## 2025-09-02 PROCEDURE — 93010 ELECTROCARDIOGRAM REPORT: CPT | Performed by: INTERNAL MEDICINE

## 2025-09-02 RX ORDER — VALBENAZINE 80 MG/1
1 CAPSULE ORAL DAILY
Qty: 30 CAPSULE | Refills: 3 | Status: ACTIVE | OUTPATIENT
Start: 2025-09-02

## 2026-08-19 ENCOUNTER — APPOINTMENT (OUTPATIENT)
Dept: CARDIOLOGY | Facility: CLINIC | Age: 31
End: 2026-08-19
Payer: MEDICARE

## (undated) DEVICE — SINGLE PORT MANIFOLD: Brand: NEPTUNE 2

## (undated) DEVICE — DRAPE,UTILITY,TAPE,15X26,STERILE: Brand: MEDLINE

## (undated) DEVICE — CONMED SCOPE SAVER BITE BLOCK, 20X27 MM: Brand: SCOPE SAVER

## (undated) DEVICE — FORCEPS BX L240CM JAW DIA2.8MM L CAP W/ NDL MIC MESH TOOTH

## (undated) DEVICE — BRUSH ENDO CLN L90.5IN SHTH DIA1.7MM BRIST DIA5-7MM 2-6MM

## (undated) DEVICE — ENDOSCOPIC TRAY TRNSPRT 20.5X16.5X4.1 IN RECYCL SUGAR PULP

## (undated) DEVICE — TUBE SET 96 MM 64 MM H2O PERISTALTIC STD AUX CHANNEL

## (undated) DEVICE — ENDO CARRY-ON PROCEDURE KIT INCLUDES LUBRICANT, DEFENDO OLYMPUS AIR, WATER, SUCTION, BIOPSY VALVE KIT, ENZYMATIC SPONGE, AND BASIN.: Brand: ENDO CARRY-ON PROCEDURE KIT

## (undated) DEVICE — TUBING IRRIGATION 140/160/180/190 SER GI ENDOSCP SMARTCAP

## (undated) DEVICE — ADAPTER FLSH PMP FLD MGMT GI IRRIG OFP 2 DISPOSABLE